# Patient Record
Sex: FEMALE | Race: WHITE | NOT HISPANIC OR LATINO | Employment: UNEMPLOYED | URBAN - METROPOLITAN AREA
[De-identification: names, ages, dates, MRNs, and addresses within clinical notes are randomized per-mention and may not be internally consistent; named-entity substitution may affect disease eponyms.]

---

## 2017-02-09 ENCOUNTER — OFFICE VISIT (OUTPATIENT)
Dept: OBSTETRICS AND GYNECOLOGY | Age: 32
End: 2017-02-09

## 2017-02-09 ENCOUNTER — TELEPHONE (OUTPATIENT)
Dept: OBSTETRICS AND GYNECOLOGY | Age: 32
End: 2017-02-09

## 2017-02-09 VITALS
HEIGHT: 64 IN | SYSTOLIC BLOOD PRESSURE: 120 MMHG | DIASTOLIC BLOOD PRESSURE: 78 MMHG | WEIGHT: 210 LBS | BODY MASS INDEX: 35.85 KG/M2

## 2017-02-09 DIAGNOSIS — Z91.89 BREASTFEEDING PROBLEM: Primary | ICD-10-CM

## 2017-02-09 PROCEDURE — 99213 OFFICE O/P EST LOW 20 MIN: CPT | Performed by: NURSE PRACTITIONER

## 2017-02-09 NOTE — PROGRESS NOTES
"Mathew Flores is a 31 y.o. female is here today as a self referral.    History of Present Illness   Chief Complaint   Patient presents with   • Breast Pain       Patient's 15 month old abruptly stopped nursing on Saturday.  Previous to that he was nursing twice a day.  Since that time her breast have been tender and full. This morning she noticed a \"lump\" on the right breast.  It was very tender.  She did end up pumping and massaging the area after speaking with Dr Shelton and Maria Dolores and was able to get it soft again and pumped about 5 ounces. She denies any fever or flu like symptoms.  She is not as tender now in that breast as well.  Denies any skin changes on red areas.  The following portions of the patient's history were reviewed and updated as appropriate: allergies, current medications, past family history, past medical history, past social history, past surgical history and problem list.    Review of Systems   Constitutional: Negative.    HENT: Negative.    Eyes: Negative.    Respiratory: Negative.    Cardiovascular: Negative.    Gastrointestinal: Negative.    Endocrine: Negative.    Genitourinary: Negative.    Musculoskeletal: Negative.    Skin: Negative.    Allergic/Immunologic: Negative.    Neurological: Negative.    Hematological: Negative.    Psychiatric/Behavioral: Negative.        Objective   Physical Exam   Constitutional: She is oriented to person, place, and time. She appears well-developed and well-nourished.   Pulmonary/Chest: Effort normal. Right breast exhibits tenderness. Right breast exhibits no inverted nipple, no mass and no skin change. Left breast exhibits tenderness. Left breast exhibits no inverted nipple, no mass and no skin change.   Breast bilaterally firm.  No masses or skin changes.  NO red areas.   Abdominal: Soft. Bowel sounds are normal.   Neurological: She is alert and oriented to person, place, and time.   Skin: Skin is warm.   Psychiatric: She has a normal mood " and affect.         Assessment/Plan   Ynes was seen today for breast pain.    Diagnoses and all orders for this visit:    Breastfeeding problem      Exam normal today and appropriate for someone in the beginning stages of weaning from breastfeeding.  It sounds like she did have some engorgement/clog that cleared with pumping.  Discussed mastitis s/s and weaning strategies.  She is also in conversation with Maria Dolores and plans to follow up with her tomorrow.  She will pump tonight to completely soften both breasts and let us know if she gets any more hard areas/redness or other signs of mastitis.

## 2017-02-09 NOTE — TELEPHONE ENCOUNTER
Pt's baby stopped feeding over weekend. She reports breasts are tender but she does not have a fever. There is no redness and both breast are tender. She notes there is a lump in right breast. She has tried to express the lump but it did not resolve. She will come in today to see NP at 3:00, she could not come in with MD earlier. She will try to pump to see if this area resolves. Suspect clogged duct/engorgement, advised pt to keep f/u appt anyway as she may have early mastitis

## 2018-02-05 ENCOUNTER — OFFICE VISIT (OUTPATIENT)
Dept: OBSTETRICS AND GYNECOLOGY | Age: 33
End: 2018-02-05

## 2018-02-05 ENCOUNTER — PROCEDURE VISIT (OUTPATIENT)
Dept: OBSTETRICS AND GYNECOLOGY | Age: 33
End: 2018-02-05

## 2018-02-05 VITALS
BODY MASS INDEX: 38.07 KG/M2 | SYSTOLIC BLOOD PRESSURE: 120 MMHG | DIASTOLIC BLOOD PRESSURE: 62 MMHG | HEIGHT: 64 IN | WEIGHT: 223 LBS

## 2018-02-05 DIAGNOSIS — O36.80X0 ENCOUNTER TO DETERMINE FETAL VIABILITY OF PREGNANCY, SINGLE OR UNSPECIFIED FETUS: Primary | ICD-10-CM

## 2018-02-05 DIAGNOSIS — O99.891 ASYMPTOMATIC BACTERIURIA DURING PREGNANCY: ICD-10-CM

## 2018-02-05 DIAGNOSIS — Z13.29 SCREENING FOR THYROID DISORDER: ICD-10-CM

## 2018-02-05 DIAGNOSIS — Z3A.08 8 WEEKS GESTATION OF PREGNANCY: ICD-10-CM

## 2018-02-05 DIAGNOSIS — R82.71 ASYMPTOMATIC BACTERIURIA DURING PREGNANCY: ICD-10-CM

## 2018-02-05 PROCEDURE — 76817 TRANSVAGINAL US OBSTETRIC: CPT | Performed by: OBSTETRICS & GYNECOLOGY

## 2018-02-05 PROCEDURE — 99213 OFFICE O/P EST LOW 20 MIN: CPT | Performed by: OBSTETRICS & GYNECOLOGY

## 2018-02-05 RX ORDER — PRENATAL VIT/IRON FUM/FOLIC AC 27MG-0.8MG
TABLET ORAL DAILY
COMMUNITY
End: 2020-05-29

## 2018-02-05 NOTE — PROGRESS NOTES
"Chief complaint:early pregnancy     HPI  Ynes Flores is a 32 y.o. female. Pt here for early pregnany u/s. She denies vaginal bleeding or pelvic pain. Pt has some mild nausea, rare emesis.         The following portions of the patient's history were reviewed and updated as appropriate: allergies, current medications, past family history, past medical history, past social history, past surgical history and problem list.    Review of Systems  Pertinent items are noted in HPI.    /62  Ht 162.6 cm (64\")  Wt 101 kg (223 lb)  LMP 12/01/2017  BMI 38.28 kg/m2    Objective   Physical Exam   Constitutional: She appears well-developed and well-nourished.   Pulmonary/Chest: Effort normal.   Psychiatric: She has a normal mood and affect. Her behavior is normal.     U/s with viable IUP, size less than dates, measures 8 weeks today, normal adnexa    Assessment/Plan   Ynes was seen today for possible pregnancy.    Diagnoses and all orders for this visit:    Encounter to determine fetal viability of pregnancy, single or unspecified fetus    viable IUP, will use u/s edc.   Reviewed flu warnings  Pt taking pnv, reviewed prenatal guidelines, no zika exposures  F/u 3 weeks for ob intake, pt and spouse decline aneuploidy or genetic testing           "

## 2018-02-07 ENCOUNTER — TELEPHONE (OUTPATIENT)
Dept: OBSTETRICS AND GYNECOLOGY | Age: 33
End: 2018-02-07

## 2018-02-07 LAB
ABO GROUP BLD: (no result)
BACTERIA UR CULT: NORMAL
BACTERIA UR CULT: NORMAL
BASOPHILS # BLD AUTO: 0 X10E3/UL (ref 0–0.2)
BASOPHILS NFR BLD AUTO: 0 %
BLD GP AB SCN SERPL QL: NEGATIVE
EOSINOPHIL # BLD AUTO: 0.1 X10E3/UL (ref 0–0.4)
EOSINOPHIL NFR BLD AUTO: 0 %
ERYTHROCYTE [DISTWIDTH] IN BLOOD BY AUTOMATED COUNT: 13.2 % (ref 12.3–15.4)
HBV SURFACE AG SERPL QL IA: NEGATIVE
HCT VFR BLD AUTO: 39.9 % (ref 34–46.6)
HGB BLD-MCNC: 13.4 G/DL (ref 11.1–15.9)
HIV 1+2 AB+HIV1 P24 AG SERPL QL IA: NON REACTIVE
IMM GRANULOCYTES # BLD: 0 X10E3/UL (ref 0–0.1)
IMM GRANULOCYTES NFR BLD: 0 %
LYMPHOCYTES # BLD AUTO: 2.8 X10E3/UL (ref 0.7–3.1)
LYMPHOCYTES NFR BLD AUTO: 23 %
MCH RBC QN AUTO: 29.8 PG (ref 26.6–33)
MCHC RBC AUTO-ENTMCNC: 33.6 G/DL (ref 31.5–35.7)
MCV RBC AUTO: 89 FL (ref 79–97)
MONOCYTES # BLD AUTO: 0.5 X10E3/UL (ref 0.1–0.9)
MONOCYTES NFR BLD AUTO: 4 %
NEUTROPHILS # BLD AUTO: 9.1 X10E3/UL (ref 1.4–7)
NEUTROPHILS NFR BLD AUTO: 73 %
PLATELET # BLD AUTO: 358 X10E3/UL (ref 150–379)
RBC # BLD AUTO: 4.49 X10E6/UL (ref 3.77–5.28)
RH BLD: POSITIVE
RPR SER QL: NON REACTIVE
RUBV IGG SERPL IA-ACNC: 2.99 INDEX
TSH SERPL DL<=0.005 MIU/L-ACNC: 0.47 MIU/ML (ref 0.27–4.2)
WBC # BLD AUTO: 12.5 X10E3/UL (ref 3.4–10.8)

## 2018-02-07 NOTE — TELEPHONE ENCOUNTER
----- Message from Fide Shelton MD sent at 2/7/2018  8:54 AM EST -----  Call Ynes, prenatal labs are normal for pregnancy, thyroid tested and normal

## 2018-02-26 ENCOUNTER — INITIAL PRENATAL (OUTPATIENT)
Dept: OBSTETRICS AND GYNECOLOGY | Age: 33
End: 2018-02-26

## 2018-02-26 VITALS — DIASTOLIC BLOOD PRESSURE: 80 MMHG | BODY MASS INDEX: 37.76 KG/M2 | SYSTOLIC BLOOD PRESSURE: 118 MMHG | WEIGHT: 220 LBS

## 2018-02-26 DIAGNOSIS — Z11.51 SCREENING FOR HPV (HUMAN PAPILLOMAVIRUS): ICD-10-CM

## 2018-02-26 DIAGNOSIS — Z11.3 SCREENING FOR STD (SEXUALLY TRANSMITTED DISEASE): ICD-10-CM

## 2018-02-26 DIAGNOSIS — Z3A.11 11 WEEKS GESTATION OF PREGNANCY: Primary | ICD-10-CM

## 2018-02-26 LAB
EXTERNAL CYSTIC FIBROSIS: NORMAL
EXTERNAL NIPT: NORMAL
VZV IGG SER QL: NORMAL

## 2018-02-26 PROCEDURE — 0501F PRENATAL FLOW SHEET: CPT | Performed by: OBSTETRICS & GYNECOLOGY

## 2018-02-26 NOTE — PROGRESS NOTES
Pt here for ob intake, reviewed prenatal guidelines and zika warnings  Pt declines aneuploidy or cf screening, mild nausea but no other issues  FHT's not heard with doppler but visualized fetal cardiac and movement with bedside u/s  F/u 4 weeks

## 2018-03-01 LAB
C TRACH RRNA CVX QL NAA+PROBE: NEGATIVE
CYTOLOGIST CVX/VAG CYTO: NORMAL
CYTOLOGY CVX/VAG DOC THIN PREP: NORMAL
DX ICD CODE: NORMAL
HIV 1 & 2 AB SER-IMP: NORMAL
HPV I/H RISK 4 DNA CVX QL PROBE+SIG AMP: NEGATIVE
N GONORRHOEA RRNA CVX QL NAA+PROBE: NEGATIVE
OTHER STN SPEC: NORMAL
PATH REPORT.FINAL DX SPEC: NORMAL
STAT OF ADQ CVX/VAG CYTO-IMP: NORMAL

## 2018-03-26 ENCOUNTER — ROUTINE PRENATAL (OUTPATIENT)
Dept: OBSTETRICS AND GYNECOLOGY | Age: 33
End: 2018-03-26

## 2018-03-26 VITALS — SYSTOLIC BLOOD PRESSURE: 126 MMHG | DIASTOLIC BLOOD PRESSURE: 82 MMHG | WEIGHT: 213 LBS | BODY MASS INDEX: 36.56 KG/M2

## 2018-03-26 DIAGNOSIS — Z3A.15 15 WEEKS GESTATION OF PREGNANCY: Primary | ICD-10-CM

## 2018-03-26 LAB — 2ND TRIMESTER 4 SCREEN SERPL-IMP: NORMAL

## 2018-03-26 PROCEDURE — 0502F SUBSEQUENT PRENATAL CARE: CPT | Performed by: OBSTETRICS & GYNECOLOGY

## 2018-03-26 RX ORDER — PSEUDOEPHEDRINE HCL 30 MG
30 TABLET ORAL EVERY 4 HOURS PRN
COMMUNITY
End: 2018-07-03

## 2018-03-26 RX ORDER — GUAIFENESIN AND DEXTROMETHORPHAN HYDROBROMIDE 100; 10 MG/5ML; MG/5ML
5 SOLUTION ORAL EVERY 12 HOURS
COMMUNITY
End: 2018-07-03

## 2018-04-27 ENCOUNTER — PROCEDURE VISIT (OUTPATIENT)
Dept: OBSTETRICS AND GYNECOLOGY | Age: 33
End: 2018-04-27

## 2018-04-27 ENCOUNTER — ROUTINE PRENATAL (OUTPATIENT)
Dept: OBSTETRICS AND GYNECOLOGY | Age: 33
End: 2018-04-27

## 2018-04-27 VITALS — BODY MASS INDEX: 36.05 KG/M2 | SYSTOLIC BLOOD PRESSURE: 122 MMHG | WEIGHT: 210 LBS | DIASTOLIC BLOOD PRESSURE: 72 MMHG

## 2018-04-27 DIAGNOSIS — Z3A.19 19 WEEKS GESTATION OF PREGNANCY: ICD-10-CM

## 2018-04-27 DIAGNOSIS — Z36.89 ENCOUNTER FOR FETAL ANATOMIC SURVEY: Primary | ICD-10-CM

## 2018-04-27 DIAGNOSIS — Z13.1 SCREENING FOR DIABETES MELLITUS: Primary | ICD-10-CM

## 2018-04-27 PROCEDURE — 76805 OB US >/= 14 WKS SNGL FETUS: CPT | Performed by: OBSTETRICS & GYNECOLOGY

## 2018-04-27 PROCEDURE — 0502F SUBSEQUENT PRENATAL CARE: CPT | Performed by: OBSTETRICS & GYNECOLOGY

## 2018-04-27 NOTE — PROGRESS NOTES
Pt is here for f/u and u/s  U/s reviewed: normal anatomic survey, normal cervical length with abdominal imaging, male anatomy noted  Plan f/u 4 weeks  Will check hgba1c and random glucose today, if normal, plan one hour in 7 weeks

## 2018-04-28 ENCOUNTER — TELEPHONE (OUTPATIENT)
Dept: OBSTETRICS AND GYNECOLOGY | Age: 33
End: 2018-04-28

## 2018-04-28 DIAGNOSIS — Z13.1 SCREENING FOR DIABETES MELLITUS: ICD-10-CM

## 2018-04-28 DIAGNOSIS — R73.09 ELEVATED GLUCOSE: Primary | ICD-10-CM

## 2018-04-28 LAB
ALBUMIN SERPL-MCNC: 3.8 G/DL (ref 3.5–5.2)
ALBUMIN/GLOB SERPL: 1.6 G/DL
ALP SERPL-CCNC: 62 U/L (ref 39–117)
ALT SERPL-CCNC: 14 U/L (ref 1–33)
AST SERPL-CCNC: 26 U/L (ref 1–32)
BILIRUB SERPL-MCNC: <0.2 MG/DL (ref 0.1–1.2)
BUN SERPL-MCNC: 8 MG/DL (ref 6–20)
BUN/CREAT SERPL: 19 (ref 7–25)
CALCIUM SERPL-MCNC: 9.5 MG/DL (ref 8.6–10.5)
CHLORIDE SERPL-SCNC: 101 MMOL/L (ref 98–107)
CO2 SERPL-SCNC: 24 MMOL/L (ref 22–29)
CREAT SERPL-MCNC: 0.42 MG/DL (ref 0.57–1)
GFR SERPLBLD CREATININE-BSD FMLA CKD-EPI: >150 ML/MIN/1.73
GFR SERPLBLD CREATININE-BSD FMLA CKD-EPI: >150 ML/MIN/1.73
GLOBULIN SER CALC-MCNC: 2.4 GM/DL
GLUCOSE SERPL-MCNC: 105 MG/DL (ref 65–99)
HBA1C MFR BLD: 5.6 % (ref 4.8–5.6)
POTASSIUM SERPL-SCNC: 4.1 MMOL/L (ref 3.5–5.2)
PROT SERPL-MCNC: 6.2 G/DL (ref 6–8.5)
SODIUM SERPL-SCNC: 141 MMOL/L (ref 136–145)

## 2018-04-28 NOTE — TELEPHONE ENCOUNTER
Called pt and reviewed elevated random glucose and upper normal result on hgbA1c. Recommend she proceed with early one hour and order placed. Pt understands and will come in this week.

## 2018-05-02 ENCOUNTER — TELEPHONE (OUTPATIENT)
Dept: OBSTETRICS AND GYNECOLOGY | Age: 33
End: 2018-05-02

## 2018-05-02 DIAGNOSIS — R73.09 ELEVATED GLUCOSE TOLERANCE TEST: Primary | ICD-10-CM

## 2018-05-02 LAB — GLUCOSE 1H P 50 G GLC PO SERPL-MCNC: 138 MG/DL (ref 65–139)

## 2018-05-02 NOTE — TELEPHONE ENCOUNTER
Called pt and reviewed glucose results. Recommend 3 hour since elevated early screen and pt understands. Order placed and she will do prior to next visit

## 2018-05-22 ENCOUNTER — ROUTINE PRENATAL (OUTPATIENT)
Dept: OBSTETRICS AND GYNECOLOGY | Age: 33
End: 2018-05-22

## 2018-05-22 VITALS — BODY MASS INDEX: 35.7 KG/M2 | DIASTOLIC BLOOD PRESSURE: 70 MMHG | SYSTOLIC BLOOD PRESSURE: 110 MMHG | WEIGHT: 208 LBS

## 2018-05-22 DIAGNOSIS — Z3A.23 23 WEEKS GESTATION OF PREGNANCY: Primary | ICD-10-CM

## 2018-05-22 PROCEDURE — 0502F SUBSEQUENT PRENATAL CARE: CPT | Performed by: OBSTETRICS & GYNECOLOGY

## 2018-05-22 NOTE — PROGRESS NOTES
Pt denies any issues today  Passed early 3 hour, will repeat at f/u with cbc  Recommend Hep A vaccine since recommended for community  Recommend tdap at 28 + weeks

## 2018-06-19 ENCOUNTER — ROUTINE PRENATAL (OUTPATIENT)
Dept: OBSTETRICS AND GYNECOLOGY | Age: 33
End: 2018-06-19

## 2018-06-19 ENCOUNTER — PROCEDURE VISIT (OUTPATIENT)
Dept: OBSTETRICS AND GYNECOLOGY | Age: 33
End: 2018-06-19

## 2018-06-19 VITALS — SYSTOLIC BLOOD PRESSURE: 114 MMHG | DIASTOLIC BLOOD PRESSURE: 64 MMHG | BODY MASS INDEX: 35.02 KG/M2 | WEIGHT: 204 LBS

## 2018-06-19 DIAGNOSIS — O36.5990 POOR FETAL GROWTH AFFECTING MANAGEMENT OF MOTHER, ANTEPARTUM, SINGLE OR UNSPECIFIED FETUS: ICD-10-CM

## 2018-06-19 DIAGNOSIS — R11.0 NAUSEA: ICD-10-CM

## 2018-06-19 DIAGNOSIS — Z13.0 SCREENING FOR DEFICIENCY ANEMIA: ICD-10-CM

## 2018-06-19 DIAGNOSIS — Z3A.27 27 WEEKS GESTATION OF PREGNANCY: ICD-10-CM

## 2018-06-19 DIAGNOSIS — Z13.1 SCREENING FOR DIABETES MELLITUS: Primary | ICD-10-CM

## 2018-06-19 PROCEDURE — 76819 FETAL BIOPHYS PROFIL W/O NST: CPT | Performed by: OBSTETRICS & GYNECOLOGY

## 2018-06-19 PROCEDURE — 76816 OB US FOLLOW-UP PER FETUS: CPT | Performed by: OBSTETRICS & GYNECOLOGY

## 2018-06-19 PROCEDURE — 0502F SUBSEQUENT PRENATAL CARE: CPT | Performed by: OBSTETRICS & GYNECOLOGY

## 2018-06-19 NOTE — PROGRESS NOTES
No complaints except recent bronchitis, better after zpack  She still has occ n/v but not severe; it was worse during her URI, she denies any pain  Notes active fetal movement, reviewed daily fmc's  Repeat 3 hour today with CBC  Wt loss noted-pt notes n/v worsened during URI and feeling better now-plan check growth u/s, cmp and amylase/lipase  rtc 2 weeks

## 2018-06-19 NOTE — PROGRESS NOTES
Addendum: u/s with decreased growth with overall efw at 11 %, and ac at 13%, requested MFM consult/u/s, pt counseled

## 2018-06-20 LAB
ALBUMIN SERPL-MCNC: 3.6 G/DL (ref 3.5–5.2)
ALBUMIN/GLOB SERPL: 1.3 G/DL
ALP SERPL-CCNC: 84 U/L (ref 39–117)
ALT SERPL-CCNC: 29 U/L (ref 1–33)
AMYLASE SERPL-CCNC: 71 U/L (ref 28–100)
AST SERPL-CCNC: 34 U/L (ref 1–32)
BASOPHILS # BLD AUTO: 0.01 10*3/MM3 (ref 0–0.2)
BASOPHILS NFR BLD AUTO: 0.1 % (ref 0–1.5)
BILIRUB SERPL-MCNC: 0.2 MG/DL (ref 0.1–1.2)
BUN SERPL-MCNC: 6 MG/DL (ref 6–20)
BUN/CREAT SERPL: 14 (ref 7–25)
CALCIUM SERPL-MCNC: 9.1 MG/DL (ref 8.6–10.5)
CHLORIDE SERPL-SCNC: 102 MMOL/L (ref 98–107)
CO2 SERPL-SCNC: 22.9 MMOL/L (ref 22–29)
CREAT SERPL-MCNC: 0.43 MG/DL (ref 0.57–1)
EOSINOPHIL # BLD AUTO: 0.04 10*3/MM3 (ref 0–0.7)
EOSINOPHIL NFR BLD AUTO: 0.4 % (ref 0.3–6.2)
ERYTHROCYTE [DISTWIDTH] IN BLOOD BY AUTOMATED COUNT: 14.1 % (ref 11.7–13)
GFR SERPLBLD CREATININE-BSD FMLA CKD-EPI: >150 ML/MIN/1.73
GFR SERPLBLD CREATININE-BSD FMLA CKD-EPI: >150 ML/MIN/1.73
GLOBULIN SER CALC-MCNC: 2.7 GM/DL
GLUCOSE 1H P 100 G GLC PO SERPL-MCNC: 156 MG/DL (ref 40–300)
GLUCOSE 2H P 100 G GLC PO SERPL-MCNC: 141 MG/DL (ref 40–300)
GLUCOSE 3H P 100 G GLC PO SERPL-MCNC: 107 MG/DL (ref 40–300)
GLUCOSE P FAST SERPL-MCNC: 85 MG/DL
GLUCOSE SERPL-MCNC: 111 MG/DL (ref 65–99)
HCT VFR BLD AUTO: 38.7 % (ref 35.6–45.5)
HGB BLD-MCNC: 12.3 G/DL (ref 11.9–15.5)
IMM GRANULOCYTES # BLD: 0.07 10*3/MM3 (ref 0–0.03)
IMM GRANULOCYTES NFR BLD: 0.6 % (ref 0–0.5)
LIPASE SERPL-CCNC: 55 U/L (ref 13–60)
LYMPHOCYTES # BLD AUTO: 2.26 10*3/MM3 (ref 0.9–4.8)
LYMPHOCYTES NFR BLD AUTO: 20.5 % (ref 19.6–45.3)
MCH RBC QN AUTO: 29.6 PG (ref 26.9–32)
MCHC RBC AUTO-ENTMCNC: 31.8 G/DL (ref 32.4–36.3)
MCV RBC AUTO: 93.3 FL (ref 80.5–98.2)
MONOCYTES # BLD AUTO: 0.49 10*3/MM3 (ref 0.2–1.2)
MONOCYTES NFR BLD AUTO: 4.4 % (ref 5–12)
NEUTROPHILS # BLD AUTO: 8.22 10*3/MM3 (ref 1.9–8.1)
NEUTROPHILS NFR BLD AUTO: 74.6 % (ref 42.7–76)
PLATELET # BLD AUTO: 299 10*3/MM3 (ref 140–500)
POTASSIUM SERPL-SCNC: 4.1 MMOL/L (ref 3.5–5.2)
PROT SERPL-MCNC: 6.3 G/DL (ref 6–8.5)
RBC # BLD AUTO: 4.15 10*6/MM3 (ref 3.9–5.2)
SODIUM SERPL-SCNC: 141 MMOL/L (ref 136–145)
WBC # BLD AUTO: 11.02 10*3/MM3 (ref 4.5–10.7)

## 2018-06-27 ENCOUNTER — HOSPITAL ENCOUNTER (OUTPATIENT)
Dept: ULTRASOUND IMAGING | Facility: HOSPITAL | Age: 33
Discharge: HOME | End: 2018-06-27
Attending: OBSTETRICS & GYNECOLOGY | Admitting: OBSTETRICS & GYNECOLOGY

## 2018-06-27 ENCOUNTER — TRANSCRIBE ORDERS (OUTPATIENT)
Dept: ADMINISTRATIVE | Facility: HOSPITAL | Age: 33
End: 2018-06-27

## 2018-06-27 DIAGNOSIS — O36.5990 POOR FETAL GROWTH AFFECTING MANAGEMENT OF MOTHER, ANTEPARTUM, SINGLE OR UNSPECIFIED FETUS: Primary | ICD-10-CM

## 2018-06-27 PROCEDURE — 76805 OB US >/= 14 WKS SNGL FETUS: CPT

## 2018-06-27 NOTE — CONSULTS
Ms. Flores is referred by Dr. Shelton for MFM consultation on indication of small for gestational age  She is accompanied by her  who was present throughout the ultrasound exam and consultation.    32  at 28 2/7 per ALVARO 18    Prenatal course is significant for  220# on 18  204# on     Pt and her  state she is eating more healthily now, during pregnancy.  They state she also lost weight during her previous pregnancy, with  weight 8#7oz.    PMH  Obstetric History       T1      L1     SAB0   TAB0   Ectopic0   Molar0   Multiple0   Live Births1       # Outcome Date GA Lbr Regulo/2nd Weight Sex Delivery Anes PTL Lv   2 Current            1 Term 10/26/15 39w0d  3827 g (8 lb 7 oz) M Vag-Spont EPI N JONA          Past Medical History:   Diagnosis Date   • Vaginal delivery    Bronchitis about 1 month ago, took Zpac    Allergies   Allergen Reactions   • Amoxicillin Hives   • Cefdinir Hives   • Penicillins Hives       No past surgical history on file.   Cyst removed from back of left ear when she was in [employer].      Current Outpatient Prescriptions:   •  dextromethorphan-guaifenesin (ROBITUSSIN-DM)  MG/5ML syrup, Take 5 mL by mouth Every 12 (Twelve) Hours., Disp: , Rfl:   •  Prenatal Vit-Fe Fumarate-FA (PRENATAL VITAMIN 27-0.8) 27-0.8 MG tablet tablet, Take  by mouth Daily., Disp: , Rfl:   •  pseudoephedrine (SUDAFED) 30 MG tablet, Take 30 mg by mouth Every 4 (Four) Hours As Needed for Congestion., Disp: , Rfl:   Not currently taking mucinex.  Took a Zpac ealier    Family history is negative for mental retardation, trisomy 21, congenital heart disease, spina bifida, cystic fibrosis, Zoroastrianism ancestry.   The patient has a family history of    Social history  No tobacco, alcohol, street drug use  Employment:  Stay at home      Labs  Declined:  -Cystic fibrosis carrier screen  -cfDNA screen     See US report        Impression:  28 27 per ALVARO 18.  Normal fetal  "growth per EFW at 83rd centile.   BMI = 35.        Recommendations:       Evaluate interval growth in 3 weeks and subsequently every 4 weeks as clinically indicated, per concern regarding maternal weight loss.        Initiate weekly BPP by 36 weeks per BMI > 35 association with increased frequency of late third trimester stillbirth.           Suggest [facility] [place] nutrition (\"Dietician\") consult re: dietary intake.      Suggest dietary journal, eg [employer] Neil.     PHYSICIAN RECOMMENDATIONS:     Cystic Fibrosis and SMA carrier screening was not documented / declined but I encourage testing through your office.     DVT Prophylaxis: SCDs during any hospital care, particularly peripartum.  If C/S  delivery is required, recommend SCDs and antibiotic prophylaxis plus early ambulation, and postpartum Lovenox 60 mg daily beginning 12-24 hrs post-op and continued to hospital discharge.     ACOG along with the CDC recommend Tdap vaccine after 20 weeks gestation during each pregnancy and a flu vaccine during the flu season to provide passive immunity to the .    For billing purposes, duration of face to face consultation was approximately 30 minutes of which > 50% was devoted to patient counseling and coordination of care, exclusive of US exam.      "

## 2018-07-03 ENCOUNTER — ROUTINE PRENATAL (OUTPATIENT)
Dept: OBSTETRICS AND GYNECOLOGY | Age: 33
End: 2018-07-03

## 2018-07-03 VITALS — DIASTOLIC BLOOD PRESSURE: 74 MMHG | BODY MASS INDEX: 35.36 KG/M2 | SYSTOLIC BLOOD PRESSURE: 118 MMHG | WEIGHT: 206 LBS

## 2018-07-03 DIAGNOSIS — Z3A.29 29 WEEKS GESTATION OF PREGNANCY: Primary | ICD-10-CM

## 2018-07-03 PROCEDURE — 0502F SUBSEQUENT PRENATAL CARE: CPT | Performed by: OBSTETRICS & GYNECOLOGY

## 2018-07-03 PROCEDURE — 90471 IMMUNIZATION ADMIN: CPT | Performed by: OBSTETRICS & GYNECOLOGY

## 2018-07-03 PROCEDURE — 90715 TDAP VACCINE 7 YRS/> IM: CPT | Performed by: OBSTETRICS & GYNECOLOGY

## 2018-07-03 NOTE — PROGRESS NOTES
Here for f/u and no issues, she reports good fetal movement and denies ctx/bleeding/leaking fluid  She saw MFM and u/s there was normal at (89 % ); she will f/u there in 2 weeks to re-assess  Tdap needed and given today, pt counseled, rtc here 2 weeks, continue daily fmc's  Wt gain noted from prior visit, pt reports she is eating well

## 2018-07-16 ENCOUNTER — HOSPITAL ENCOUNTER (OUTPATIENT)
Dept: ULTRASOUND IMAGING | Facility: HOSPITAL | Age: 33
Discharge: HOME | End: 2018-07-16
Attending: OBSTETRICS & GYNECOLOGY | Admitting: OBSTETRICS & GYNECOLOGY

## 2018-07-16 ENCOUNTER — TRANSCRIBE ORDERS (OUTPATIENT)
Dept: ADMINISTRATIVE | Facility: HOSPITAL | Age: 33
End: 2018-07-16

## 2018-07-16 DIAGNOSIS — O36.5990 POOR FETAL GROWTH AFFECTING MANAGEMENT OF MOTHER, ANTEPARTUM, SINGLE OR UNSPECIFIED FETUS: ICD-10-CM

## 2018-07-16 PROCEDURE — 76816 OB US FOLLOW-UP PER FETUS: CPT

## 2018-07-20 ENCOUNTER — ROUTINE PRENATAL (OUTPATIENT)
Dept: OBSTETRICS AND GYNECOLOGY | Age: 33
End: 2018-07-20

## 2018-07-20 VITALS — WEIGHT: 207.6 LBS | SYSTOLIC BLOOD PRESSURE: 110 MMHG | DIASTOLIC BLOOD PRESSURE: 68 MMHG | BODY MASS INDEX: 35.63 KG/M2

## 2018-07-20 DIAGNOSIS — Z3A.31 31 WEEKS GESTATION OF PREGNANCY: Primary | ICD-10-CM

## 2018-07-20 PROBLEM — R63.8 INCREASED BMI: Status: ACTIVE | Noted: 2018-07-20

## 2018-07-20 PROCEDURE — 0502F SUBSEQUENT PRENATAL CARE: CPT | Performed by: OBSTETRICS & GYNECOLOGY

## 2018-07-20 RX ORDER — DIPHENHYDRAMINE HCL 25 MG
25 CAPSULE ORAL EVERY 6 HOURS PRN
COMMUNITY
End: 2018-08-10

## 2018-07-20 NOTE — PROGRESS NOTES
No issues today, had u/s with MFM this week, growth was normal at 46 %, 3 lb 12 oz, AC normal as well, MFM advised repeat growth 4 weeks and BPP 36+ weeks; pt desires to do these here at office  Advised daily fmc's, f/u 2 weeks

## 2018-08-03 ENCOUNTER — ROUTINE PRENATAL (OUTPATIENT)
Dept: OBSTETRICS AND GYNECOLOGY | Age: 33
End: 2018-08-03

## 2018-08-03 VITALS — DIASTOLIC BLOOD PRESSURE: 80 MMHG | WEIGHT: 208 LBS | SYSTOLIC BLOOD PRESSURE: 118 MMHG | BODY MASS INDEX: 35.7 KG/M2

## 2018-08-03 DIAGNOSIS — Z3A.33 33 WEEKS GESTATION OF PREGNANCY: Primary | ICD-10-CM

## 2018-08-03 PROCEDURE — 0502F SUBSEQUENT PRENATAL CARE: CPT | Performed by: OBSTETRICS & GYNECOLOGY

## 2018-08-03 NOTE — PROGRESS NOTES
Here for f/u OB appt  She was treated for URI by primary MD, she just finished a zpack; she still has cough but reports mucous was purulent but now it has cleared. She denies soa or fevers.   Pt denies any OB issues, reports active fetal movement, no ctx/bleeding/leaking fluid  O: pt in no distress  Lungs: clear bilaterally, normal respiratory effort  Heart: regular rate and rhythm  A/p: URI: sputum cleared with antibiotic therapy, reviewed supportive measures, advised to ER with soa / fevers /chest pain  F/u 1 week for growth u/s/ BPP, continue daily fmc's and labor warnings

## 2018-08-10 ENCOUNTER — ROUTINE PRENATAL (OUTPATIENT)
Dept: OBSTETRICS AND GYNECOLOGY | Age: 33
End: 2018-08-10

## 2018-08-10 ENCOUNTER — PROCEDURE VISIT (OUTPATIENT)
Dept: OBSTETRICS AND GYNECOLOGY | Age: 33
End: 2018-08-10

## 2018-08-10 VITALS — BODY MASS INDEX: 35.87 KG/M2 | WEIGHT: 209 LBS | DIASTOLIC BLOOD PRESSURE: 70 MMHG | SYSTOLIC BLOOD PRESSURE: 110 MMHG

## 2018-08-10 DIAGNOSIS — Z3A.34 34 WEEKS GESTATION OF PREGNANCY: Primary | ICD-10-CM

## 2018-08-10 DIAGNOSIS — Z36.89 ULTRASOUND SCAN TO CHECK INTERVAL GROWTH OF FETUS: ICD-10-CM

## 2018-08-10 DIAGNOSIS — R63.8 INCREASED BMI: ICD-10-CM

## 2018-08-10 PROCEDURE — 0502F SUBSEQUENT PRENATAL CARE: CPT | Performed by: OBSTETRICS & GYNECOLOGY

## 2018-08-10 PROCEDURE — 76816 OB US FOLLOW-UP PER FETUS: CPT | Performed by: OBSTETRICS & GYNECOLOGY

## 2018-08-10 PROCEDURE — 76819 FETAL BIOPHYS PROFIL W/O NST: CPT | Performed by: OBSTETRICS & GYNECOLOGY

## 2018-08-10 RX ORDER — ASCORBIC ACID 250 MG
TABLET,CHEWABLE ORAL
COMMUNITY
End: 2020-11-04

## 2018-08-20 ENCOUNTER — ROUTINE PRENATAL (OUTPATIENT)
Dept: OBSTETRICS AND GYNECOLOGY | Age: 33
End: 2018-08-20

## 2018-08-20 ENCOUNTER — PROCEDURE VISIT (OUTPATIENT)
Dept: OBSTETRICS AND GYNECOLOGY | Age: 33
End: 2018-08-20

## 2018-08-20 VITALS — BODY MASS INDEX: 36.05 KG/M2 | SYSTOLIC BLOOD PRESSURE: 110 MMHG | DIASTOLIC BLOOD PRESSURE: 70 MMHG | WEIGHT: 210 LBS

## 2018-08-20 DIAGNOSIS — Z3A.36 36 WEEKS GESTATION OF PREGNANCY: Primary | ICD-10-CM

## 2018-08-20 DIAGNOSIS — R63.8 INCREASED BMI: ICD-10-CM

## 2018-08-20 DIAGNOSIS — O09.523 ELDERLY MULTIGRAVIDA IN THIRD TRIMESTER: Primary | ICD-10-CM

## 2018-08-20 DIAGNOSIS — Z36.85 ANTENATAL SCREENING FOR STREPTOCOCCUS B: ICD-10-CM

## 2018-08-20 PROCEDURE — 0502F SUBSEQUENT PRENATAL CARE: CPT | Performed by: OBSTETRICS & GYNECOLOGY

## 2018-08-20 PROCEDURE — 76819 FETAL BIOPHYS PROFIL W/O NST: CPT | Performed by: OBSTETRICS & GYNECOLOGY

## 2018-08-20 RX ORDER — GUAIFENESIN 600 MG/1
1200 TABLET, EXTENDED RELEASE ORAL 2 TIMES DAILY
COMMUNITY
End: 2020-11-04

## 2018-08-20 NOTE — PROGRESS NOTES
Here for f/u visit and BPP, no issues; notes active fetal movement, denies reg ctx/bleeding/leaking fluid  Her URI symptoms are resolving now  O: BPP 8/8, larisa normal at 9.5    A/P: GBS done today with sensitivity    Advised daily fmc's and reviewed labor warnings    Continue weekly BPP for increased BMI

## 2018-08-22 LAB — GP B STREP DNA SPEC QL NAA+PROBE: NEGATIVE

## 2018-08-23 ENCOUNTER — TELEPHONE (OUTPATIENT)
Dept: OBSTETRICS AND GYNECOLOGY | Age: 33
End: 2018-08-23

## 2018-08-23 NOTE — TELEPHONE ENCOUNTER
----- Message from Fide Shelton MD sent at 8/22/2018  5:03 PM EDT -----  Please call Ynes, her GBS is negative

## 2018-08-27 ENCOUNTER — PROCEDURE VISIT (OUTPATIENT)
Dept: OBSTETRICS AND GYNECOLOGY | Age: 33
End: 2018-08-27

## 2018-08-27 ENCOUNTER — ROUTINE PRENATAL (OUTPATIENT)
Dept: OBSTETRICS AND GYNECOLOGY | Age: 33
End: 2018-08-27

## 2018-08-27 VITALS — WEIGHT: 212.8 LBS | SYSTOLIC BLOOD PRESSURE: 118 MMHG | DIASTOLIC BLOOD PRESSURE: 74 MMHG | BODY MASS INDEX: 36.53 KG/M2

## 2018-08-27 DIAGNOSIS — Z3A.37 37 WEEKS GESTATION OF PREGNANCY: Primary | ICD-10-CM

## 2018-08-27 PROCEDURE — 76819 FETAL BIOPHYS PROFIL W/O NST: CPT | Performed by: OBSTETRICS & GYNECOLOGY

## 2018-08-27 PROCEDURE — 0502F SUBSEQUENT PRENATAL CARE: CPT | Performed by: OBSTETRICS & GYNECOLOGY

## 2018-08-27 NOTE — PROGRESS NOTES
Pt presents for f/u and BPP; she denies issues and notes active fetal movement  O: u/s: BPP 8/8, larisa normal at 10  A/p: 37 weeks, continue weekly BPP and daily fmc's, reviewed labor warnings  Pt requested order for breast pump and given

## 2018-09-04 ENCOUNTER — ROUTINE PRENATAL (OUTPATIENT)
Dept: OBSTETRICS AND GYNECOLOGY | Age: 33
End: 2018-09-04

## 2018-09-04 ENCOUNTER — PROCEDURE VISIT (OUTPATIENT)
Dept: OBSTETRICS AND GYNECOLOGY | Age: 33
End: 2018-09-04

## 2018-09-04 VITALS — BODY MASS INDEX: 36.32 KG/M2 | WEIGHT: 211.6 LBS | DIASTOLIC BLOOD PRESSURE: 72 MMHG | SYSTOLIC BLOOD PRESSURE: 110 MMHG

## 2018-09-04 DIAGNOSIS — Z3A.38 38 WEEKS GESTATION OF PREGNANCY: Primary | ICD-10-CM

## 2018-09-04 DIAGNOSIS — R63.8 INCREASED BMI: ICD-10-CM

## 2018-09-04 PROCEDURE — 0502F SUBSEQUENT PRENATAL CARE: CPT | Performed by: OBSTETRICS & GYNECOLOGY

## 2018-09-04 PROCEDURE — 76819 FETAL BIOPHYS PROFIL W/O NST: CPT | Performed by: OBSTETRICS & GYNECOLOGY

## 2018-09-04 NOTE — PROGRESS NOTES
Here for visit and BPP, pt notes normal fetal movement, no regular ctx  BPP : 8/8, larisa 12  A/p: 38 weeks, recommend IOL next week at 39 weeks, pt wants to consider, will repeat BPP and growth next week; continue daily fmc's and labor warnings

## 2018-09-11 ENCOUNTER — ROUTINE PRENATAL (OUTPATIENT)
Dept: OBSTETRICS AND GYNECOLOGY | Age: 33
End: 2018-09-11

## 2018-09-11 ENCOUNTER — PROCEDURE VISIT (OUTPATIENT)
Dept: OBSTETRICS AND GYNECOLOGY | Age: 33
End: 2018-09-11

## 2018-09-11 ENCOUNTER — ANESTHESIA (OUTPATIENT)
Dept: LABOR AND DELIVERY | Facility: HOSPITAL | Age: 33
End: 2018-09-11

## 2018-09-11 ENCOUNTER — HOSPITAL ENCOUNTER (INPATIENT)
Facility: HOSPITAL | Age: 33
LOS: 3 days | Discharge: HOME | End: 2018-09-14
Attending: OBSTETRICS & GYNECOLOGY | Admitting: OBSTETRICS & GYNECOLOGY

## 2018-09-11 ENCOUNTER — PREP FOR SURGERY (OUTPATIENT)
Dept: OBSTETRICS AND GYNECOLOGY | Age: 33
End: 2018-09-11

## 2018-09-11 ENCOUNTER — ANESTHESIA EVENT (OUTPATIENT)
Dept: LABOR AND DELIVERY | Facility: HOSPITAL | Age: 33
End: 2018-09-11

## 2018-09-11 VITALS — BODY MASS INDEX: 36.73 KG/M2 | WEIGHT: 214 LBS | DIASTOLIC BLOOD PRESSURE: 78 MMHG | SYSTOLIC BLOOD PRESSURE: 120 MMHG

## 2018-09-11 DIAGNOSIS — Z3A.39 39 WEEKS GESTATION OF PREGNANCY: Primary | ICD-10-CM

## 2018-09-11 DIAGNOSIS — O36.5990 POOR FETAL GROWTH AFFECTING MANAGEMENT OF MOTHER, ANTEPARTUM, SINGLE OR UNSPECIFIED FETUS: ICD-10-CM

## 2018-09-11 DIAGNOSIS — O36.5990 POOR FETAL GROWTH AFFECTING MANAGEMENT OF MOTHER, ANTEPARTUM, SINGLE OR UNSPECIFIED FETUS: Primary | ICD-10-CM

## 2018-09-11 LAB
DEPRECATED RDW RBC AUTO: 44.8 FL (ref 37–54)
ERYTHROCYTE [DISTWIDTH] IN BLOOD BY AUTOMATED COUNT: 13.8 % (ref 11.7–13)
HCT VFR BLD AUTO: 37.6 % (ref 35.6–45.5)
HGB BLD-MCNC: 11.9 G/DL (ref 11.9–15.5)
MCH RBC QN AUTO: 28.5 PG (ref 26.9–32)
MCHC RBC AUTO-ENTMCNC: 31.6 G/DL (ref 32.4–36.3)
MCV RBC AUTO: 90.2 FL (ref 80.5–98.2)
PLATELET # BLD AUTO: 273 10*3/MM3 (ref 140–500)
PMV BLD AUTO: 11.1 FL (ref 6–12)
RBC # BLD AUTO: 4.17 10*6/MM3 (ref 3.9–5.2)
WBC NRBC COR # BLD: 12.07 10*3/MM3 (ref 4.5–10.7)

## 2018-09-11 PROCEDURE — 76819 FETAL BIOPHYS PROFIL W/O NST: CPT | Performed by: OBSTETRICS & GYNECOLOGY

## 2018-09-11 PROCEDURE — 0502F SUBSEQUENT PRENATAL CARE: CPT | Performed by: OBSTETRICS & GYNECOLOGY

## 2018-09-11 PROCEDURE — 86900 BLOOD TYPING SEROLOGIC ABO: CPT | Performed by: OBSTETRICS & GYNECOLOGY

## 2018-09-11 PROCEDURE — 85027 COMPLETE CBC AUTOMATED: CPT | Performed by: OBSTETRICS & GYNECOLOGY

## 2018-09-11 PROCEDURE — 86901 BLOOD TYPING SEROLOGIC RH(D): CPT | Performed by: OBSTETRICS & GYNECOLOGY

## 2018-09-11 PROCEDURE — 86850 RBC ANTIBODY SCREEN: CPT | Performed by: OBSTETRICS & GYNECOLOGY

## 2018-09-11 RX ORDER — FAMOTIDINE 10 MG/ML
20 INJECTION, SOLUTION INTRAVENOUS ONCE AS NEEDED
Status: DISCONTINUED | OUTPATIENT
Start: 2018-09-11 | End: 2018-09-12 | Stop reason: HOSPADM

## 2018-09-11 RX ORDER — SODIUM CHLORIDE 0.9 % (FLUSH) 0.9 %
1-10 SYRINGE (ML) INJECTION AS NEEDED
Status: CANCELLED | OUTPATIENT
Start: 2018-09-11

## 2018-09-11 RX ORDER — EPHEDRINE SULFATE 50 MG/ML
5 INJECTION, SOLUTION INTRAVENOUS AS NEEDED
Status: DISCONTINUED | OUTPATIENT
Start: 2018-09-11 | End: 2018-09-12 | Stop reason: HOSPADM

## 2018-09-11 RX ORDER — DIPHENHYDRAMINE HYDROCHLORIDE 50 MG/ML
12.5 INJECTION INTRAMUSCULAR; INTRAVENOUS EVERY 8 HOURS PRN
Status: DISCONTINUED | OUTPATIENT
Start: 2018-09-11 | End: 2018-09-12 | Stop reason: HOSPADM

## 2018-09-11 RX ORDER — ONDANSETRON 2 MG/ML
4 INJECTION INTRAMUSCULAR; INTRAVENOUS ONCE AS NEEDED
Status: DISCONTINUED | OUTPATIENT
Start: 2018-09-11 | End: 2018-09-12 | Stop reason: HOSPADM

## 2018-09-11 RX ADMIN — DINOPROSTONE 10 MG: 10 INSERT VAGINAL at 23:20

## 2018-09-11 NOTE — PROGRESS NOTES
Here for f/u, no issues today  U/s: EFW 7 lb 1 oz, AC at 5 % larisa 7 to 10, BPP 8/8  A/p: 39 weeks with decreased growth and increased BMI  Recommend IOL given diminished growth noted with trending and AC now under 10 %. In addition, reviewed MFM consult regarding increased BMI and increased risk of stillbirth. Reviewed risks of IOL to include hemorrhage, infection, possibly increased c/s  however, given her risk factors, I would recommend induction. Pt and spouse considered options and desire to proceed with IOL. Plan this pm with cervidil. Reviewed instructions.

## 2018-09-12 LAB
ABO GROUP BLD: NORMAL
ATMOSPHERIC PRESS: 755.6 MMHG
BASE EXCESS BLDCOV CALC-SCNC: -3.7 MMOL/L (ref -30–30)
BDY SITE: ABNORMAL
BLD GP AB SCN SERPL QL: NEGATIVE
GAS FLOW AIRWAY: 10 LPM
HCO3 BLDCOV-SCNC: 22.5 MMOL/L
MODALITY: ABNORMAL
PCO2 BLDCOV: 44 MM HG (ref 35–51.3)
PH BLDCOV: 7.32 PH UNITS (ref 7.26–7.4)
PO2 BLDCOV: <25.2 MM HG (ref 19–39)
RH BLD: POSITIVE
SAO2 % BLDCOA: 36.1 % (ref 92–99)
SAO2 % BLDCOV: ABNORMAL %
T&S EXPIRATION DATE: NORMAL

## 2018-09-12 PROCEDURE — 25010000002 BUTORPHANOL PER 1 MG: Performed by: OBSTETRICS & GYNECOLOGY

## 2018-09-12 PROCEDURE — 59400 OBSTETRICAL CARE: CPT | Performed by: OBSTETRICS & GYNECOLOGY

## 2018-09-12 PROCEDURE — 3E0P7VZ INTRODUCTION OF HORMONE INTO FEMALE REPRODUCTIVE, VIA NATURAL OR ARTIFICIAL OPENING: ICD-10-PCS | Performed by: OBSTETRICS & GYNECOLOGY

## 2018-09-12 PROCEDURE — 88307 TISSUE EXAM BY PATHOLOGIST: CPT

## 2018-09-12 PROCEDURE — 82803 BLOOD GASES ANY COMBINATION: CPT

## 2018-09-12 PROCEDURE — C1755 CATHETER, INTRASPINAL: HCPCS | Performed by: ANESTHESIOLOGY

## 2018-09-12 PROCEDURE — 0HQ9XZZ REPAIR PERINEUM SKIN, EXTERNAL APPROACH: ICD-10-PCS | Performed by: OBSTETRICS & GYNECOLOGY

## 2018-09-12 RX ORDER — METHYLERGONOVINE MALEATE 0.2 MG/ML
200 INJECTION INTRAVENOUS ONCE AS NEEDED
Status: DISCONTINUED | OUTPATIENT
Start: 2018-09-12 | End: 2018-09-12 | Stop reason: HOSPADM

## 2018-09-12 RX ORDER — OXYTOCIN-SODIUM CHLORIDE 0.9% IV SOLN 30 UNIT/500ML 30-0.9/5 UT/ML-%
125 SOLUTION INTRAVENOUS CONTINUOUS PRN
Status: COMPLETED | OUTPATIENT
Start: 2018-09-12 | End: 2018-09-12

## 2018-09-12 RX ORDER — PROMETHAZINE HYDROCHLORIDE 25 MG/ML
12.5 INJECTION, SOLUTION INTRAMUSCULAR; INTRAVENOUS EVERY 4 HOURS PRN
Status: DISCONTINUED | OUTPATIENT
Start: 2018-09-12 | End: 2018-09-14 | Stop reason: HOSPADM

## 2018-09-12 RX ORDER — ONDANSETRON 4 MG/1
4 TABLET, FILM COATED ORAL EVERY 8 HOURS PRN
Status: DISCONTINUED | OUTPATIENT
Start: 2018-09-12 | End: 2018-09-14 | Stop reason: HOSPADM

## 2018-09-12 RX ORDER — ONDANSETRON 2 MG/ML
4 INJECTION INTRAMUSCULAR; INTRAVENOUS EVERY 6 HOURS PRN
Status: DISCONTINUED | OUTPATIENT
Start: 2018-09-12 | End: 2018-09-14 | Stop reason: HOSPADM

## 2018-09-12 RX ORDER — PROMETHAZINE HYDROCHLORIDE 12.5 MG/1
12.5 TABLET ORAL EVERY 4 HOURS PRN
Status: DISCONTINUED | OUTPATIENT
Start: 2018-09-12 | End: 2018-09-14 | Stop reason: HOSPADM

## 2018-09-12 RX ORDER — CARBOPROST TROMETHAMINE 250 UG/ML
250 INJECTION, SOLUTION INTRAMUSCULAR ONCE AS NEEDED
Status: DISCONTINUED | OUTPATIENT
Start: 2018-09-12 | End: 2018-09-14 | Stop reason: HOSPADM

## 2018-09-12 RX ORDER — LIDOCAINE HYDROCHLORIDE AND EPINEPHRINE 15; 5 MG/ML; UG/ML
INJECTION, SOLUTION EPIDURAL AS NEEDED
Status: DISCONTINUED | OUTPATIENT
Start: 2018-09-12 | End: 2018-09-12 | Stop reason: SURG

## 2018-09-12 RX ORDER — EPHEDRINE SULFATE 50 MG/ML
10 INJECTION, SOLUTION INTRAVENOUS
Status: DISCONTINUED | OUTPATIENT
Start: 2018-09-12 | End: 2018-09-12 | Stop reason: HOSPADM

## 2018-09-12 RX ORDER — OXYTOCIN-SODIUM CHLORIDE 0.9% IV SOLN 30 UNIT/500ML 30-0.9/5 UT/ML-%
999 SOLUTION INTRAVENOUS ONCE
Status: COMPLETED | OUTPATIENT
Start: 2018-09-12 | End: 2018-09-12

## 2018-09-12 RX ORDER — IBUPROFEN 800 MG/1
800 TABLET ORAL EVERY 8 HOURS PRN
Status: DISCONTINUED | OUTPATIENT
Start: 2018-09-12 | End: 2018-09-14 | Stop reason: HOSPADM

## 2018-09-12 RX ORDER — PHYTONADIONE 1 MG/.5ML
INJECTION, EMULSION INTRAMUSCULAR; INTRAVENOUS; SUBCUTANEOUS
Status: DISPENSED
Start: 2018-09-12 | End: 2018-09-12

## 2018-09-12 RX ORDER — OXYCODONE HYDROCHLORIDE AND ACETAMINOPHEN 5; 325 MG/1; MG/1
2 TABLET ORAL EVERY 4 HOURS PRN
Status: DISCONTINUED | OUTPATIENT
Start: 2018-09-12 | End: 2018-09-14 | Stop reason: HOSPADM

## 2018-09-12 RX ORDER — PROMETHAZINE HYDROCHLORIDE 25 MG/1
25 TABLET ORAL EVERY 6 HOURS PRN
Status: DISCONTINUED | OUTPATIENT
Start: 2018-09-12 | End: 2018-09-14 | Stop reason: HOSPADM

## 2018-09-12 RX ORDER — SODIUM CHLORIDE, SODIUM LACTATE, POTASSIUM CHLORIDE, CALCIUM CHLORIDE 600; 310; 30; 20 MG/100ML; MG/100ML; MG/100ML; MG/100ML
125 INJECTION, SOLUTION INTRAVENOUS CONTINUOUS
Status: DISCONTINUED | OUTPATIENT
Start: 2018-09-12 | End: 2018-09-14 | Stop reason: HOSPADM

## 2018-09-12 RX ORDER — ACETAMINOPHEN 325 MG/1
650 TABLET ORAL ONCE
Status: COMPLETED | OUTPATIENT
Start: 2018-09-12 | End: 2018-09-12

## 2018-09-12 RX ORDER — OXYCODONE AND ACETAMINOPHEN 7.5; 325 MG/1; MG/1
1 TABLET ORAL EVERY 4 HOURS PRN
Status: DISCONTINUED | OUTPATIENT
Start: 2018-09-12 | End: 2018-09-14 | Stop reason: HOSPADM

## 2018-09-12 RX ORDER — BISACODYL 10 MG
10 SUPPOSITORY, RECTAL RECTAL DAILY PRN
Status: DISCONTINUED | OUTPATIENT
Start: 2018-09-13 | End: 2018-09-14 | Stop reason: HOSPADM

## 2018-09-12 RX ORDER — ERYTHROMYCIN 5 MG/G
OINTMENT OPHTHALMIC
Status: DISPENSED
Start: 2018-09-12 | End: 2018-09-12

## 2018-09-12 RX ORDER — DOCUSATE SODIUM 100 MG/1
100 CAPSULE, LIQUID FILLED ORAL 2 TIMES DAILY
Status: DISCONTINUED | OUTPATIENT
Start: 2018-09-12 | End: 2018-09-14 | Stop reason: HOSPADM

## 2018-09-12 RX ORDER — OXYTOCIN-SODIUM CHLORIDE 0.9% IV SOLN 30 UNIT/500ML 30-0.9/5 UT/ML-%
250 SOLUTION INTRAVENOUS CONTINUOUS
Status: ACTIVE | OUTPATIENT
Start: 2018-09-12 | End: 2018-09-12

## 2018-09-12 RX ORDER — MISOPROSTOL 200 UG/1
800 TABLET ORAL AS NEEDED
Status: DISCONTINUED | OUTPATIENT
Start: 2018-09-12 | End: 2018-09-12 | Stop reason: HOSPADM

## 2018-09-12 RX ORDER — SODIUM CHLORIDE 0.9 % (FLUSH) 0.9 %
1-10 SYRINGE (ML) INJECTION AS NEEDED
Status: DISCONTINUED | OUTPATIENT
Start: 2018-09-12 | End: 2018-09-14 | Stop reason: HOSPADM

## 2018-09-12 RX ORDER — CARBOPROST TROMETHAMINE 250 UG/ML
250 INJECTION, SOLUTION INTRAMUSCULAR AS NEEDED
Status: DISCONTINUED | OUTPATIENT
Start: 2018-09-12 | End: 2018-09-12 | Stop reason: HOSPADM

## 2018-09-12 RX ORDER — PROMETHAZINE HYDROCHLORIDE 25 MG/ML
12.5 INJECTION, SOLUTION INTRAMUSCULAR; INTRAVENOUS EVERY 6 HOURS PRN
Status: DISCONTINUED | OUTPATIENT
Start: 2018-09-12 | End: 2018-09-14 | Stop reason: HOSPADM

## 2018-09-12 RX ORDER — LANOLIN 100 %
OINTMENT (GRAM) TOPICAL AS NEEDED
Status: DISCONTINUED | OUTPATIENT
Start: 2018-09-12 | End: 2018-09-14 | Stop reason: HOSPADM

## 2018-09-12 RX ORDER — PROMETHAZINE HYDROCHLORIDE 25 MG/1
12.5 SUPPOSITORY RECTAL EVERY 6 HOURS PRN
Status: DISCONTINUED | OUTPATIENT
Start: 2018-09-12 | End: 2018-09-14 | Stop reason: HOSPADM

## 2018-09-12 RX ORDER — MISOPROSTOL 200 UG/1
600 TABLET ORAL ONCE AS NEEDED
Status: DISCONTINUED | OUTPATIENT
Start: 2018-09-12 | End: 2018-09-14 | Stop reason: HOSPADM

## 2018-09-12 RX ADMIN — DOCUSATE SODIUM 100 MG: 100 CAPSULE, LIQUID FILLED ORAL at 20:22

## 2018-09-12 RX ADMIN — OXYTOCIN 999 ML/HR: 10 INJECTION INTRAVENOUS at 07:16

## 2018-09-12 RX ADMIN — EPHEDRINE SULFATE 10 MG: 50 INJECTION INTRAVENOUS at 05:35

## 2018-09-12 RX ADMIN — SODIUM CHLORIDE, POTASSIUM CHLORIDE, SODIUM LACTATE AND CALCIUM CHLORIDE 999 ML/HR: 600; 310; 30; 20 INJECTION, SOLUTION INTRAVENOUS at 05:40

## 2018-09-12 RX ADMIN — LIDOCAINE HYDROCHLORIDE AND EPINEPHRINE 5 ML: 15; 5 INJECTION, SOLUTION EPIDURAL at 05:20

## 2018-09-12 RX ADMIN — SODIUM CHLORIDE, POTASSIUM CHLORIDE, SODIUM LACTATE AND CALCIUM CHLORIDE 125 ML/HR: 600; 310; 30; 20 INJECTION, SOLUTION INTRAVENOUS at 00:00

## 2018-09-12 RX ADMIN — OXYTOCIN 125 ML/HR: 10 INJECTION, SOLUTION INTRAMUSCULAR; INTRAVENOUS at 08:37

## 2018-09-12 RX ADMIN — EPHEDRINE SULFATE 10 MG: 50 INJECTION INTRAVENOUS at 05:48

## 2018-09-12 RX ADMIN — ACETAMINOPHEN 650 MG: 325 TABLET, FILM COATED ORAL at 01:51

## 2018-09-12 RX ADMIN — BETAMETHASONE VALERATE: 1.2 OINTMENT TOPICAL at 18:22

## 2018-09-12 RX ADMIN — Medication 8 ML/HR: at 05:30

## 2018-09-12 RX ADMIN — BUTORPHANOL TARTRATE 2 MG: 2 INJECTION, SOLUTION INTRAMUSCULAR; INTRAVENOUS at 03:06

## 2018-09-12 RX ADMIN — SODIUM CHLORIDE, POTASSIUM CHLORIDE, SODIUM LACTATE AND CALCIUM CHLORIDE 125 ML/HR: 600; 310; 30; 20 INJECTION, SOLUTION INTRAVENOUS at 03:30

## 2018-09-12 RX ADMIN — IBUPROFEN 800 MG: 800 TABLET ORAL at 23:48

## 2018-09-12 NOTE — ANESTHESIA PROCEDURE NOTES
Labor Epidural    Patient location during procedure: OB  Start Time: 9/12/2018 5:12 AM  Stop Time: 9/12/2018 5:34 AM  Performed By  Anesthesiologist: NICO DIA  Preanesthetic Checklist  Completed: patient identified, site marked, surgical consent, pre-op evaluation, timeout performed, IV checked, risks and benefits discussed and monitors and equipment checked  Prep:  Pt Position:sitting  Sterile Tech:cap, gloves, mask and sterile barrier  Prep:povidone-iodine 7.5% surgical scrub  Monitoring:blood pressure monitoring, continuous pulse oximetry and EKG  Epidural Block Procedure:  Approach:midline  Guidance:landmark technique and palpation technique  Location:L4-L5  Needle Type:Tuohy  Needle Gauge:17  Loss of Resistance Medium: air  Paresthesia: none  Aspiration:negative  Test Dose:negative  Number of Attempts: 1  Post Assessment:  Dressing:occlusive dressing applied and secured with tape  Pt Tolerance:patient tolerated the procedure well with no apparent complications  Complications:no

## 2018-09-12 NOTE — ANESTHESIA POSTPROCEDURE EVALUATION
Patient: Ynes ZHOU Sandra    Procedure Summary     Date:  09/12/18 Room / Location:      Anesthesia Start:  0512 Anesthesia Stop:  0714    Procedure:  LABOR ANALGESIA Diagnosis:      Scheduled Providers:   Provider:  Braydon Oh MD    Anesthesia Type:  epidural ASA Status:  2          Anesthesia Type: epidural  Last vitals  BP   135/80 (09/12/18 0800)   Temp   36.4 °C (97.6 °F) (09/12/18 0300)   Pulse   107 (09/12/18 0800)   Resp   17 (09/12/18 0800)     SpO2   99 % (09/11/18 2307)     Post Anesthesia Care and Evaluation    Anesthetic complications: No anesthetic complications

## 2018-09-12 NOTE — L&D DELIVERY NOTE
2018    Patient:Ynes Flores    MR#:[id number]    Vaginal Delivery Note  32 y.o. yo female  at 39w2d admitted for IOL for poor fetal growth     Patient Active Problem List   Diagnosis   • Increased BMI   • Poor fetal growth, affecting management of mother, antepartum condition or complication       Delivery     Delivery: Vaginal, Spontaneous Delivery     YOB: 2018    Time of Birth: 7:14 AM      Anesthesia: Epidural     Delivering clinician: Arthur Morales    Forceps?   No   Vacuum? No    Shoulder dystocia present: No        Delivery narrative:  The patient is admitted for IOL for poor fetal growth.  Cervidil was placed per protocol.  The patient entered an active phase of labor and progressed along a normal labor curve to complete dilatation at +1 station with spontaneous ROM    The patient precipitously delivered a live viable male infant occiput anterior with restitution to occiput right while I was in the room gowning.  The anterior and posterior shoulder delivered without difficulty.  No nuchal cord was identified. The body was delivered atraumatically.  The infant's nose and oropharynx were suctioned and cleared of secretions.  Cord clamping was delayed a minimum of 30 seconds then was clamped and cut.  The infant was placed on the mom's chest for bonding and care.    The placenta was expressed and delivered intact.      EBL: 200    Summary:  Uncomplicated Vaginal delivery      Infant    Findings: male  infant     Infant observations: Weight: 2702 g (5 lb 15.3 oz)     Observations/Comments:  scale 4      Apgars: 8   @ 1 minute /    9   @ 5 minutes   Infant Name: Vladimir      Placenta, Cord, and Fluid    Placenta delivered  Manual removal  at  2018  7:16 AM     Cord: 3 vessels  present.   Nuchal Cord?  no   Cord blood obtained: Yes    Cord gases obtained:  Yes    Cord gas results: Lab Results   Component Value Date    PHCVEN 7.317 2018            Repair    Episiotomy:  No   Lacerations: Yes  Laceration Information  Laceration Repaired?   Perineal: 1st  Yes    Periurethral:         Labial:         Sulcus:         Vaginal: No       Cervical: No          Suture used for repair: 3-0 chromic gut   Estimated Blood Loss: 200  mls.         Complications  precipitous delivery    Disposition  Mother to Mother Baby/Postpartum  in stable condition currently.  Baby to NBN  in stable condition currently.    [x]  Labs reviewed:  Bld B pos    Rubella imm  Varicella imm per history   Immunization History   Administered Date(s) Administered   • Tdap 07/03/2018           Arthur Morales MD  09/12/18  8:24 AM

## 2018-09-12 NOTE — LACTATION NOTE
Baby BF on right breast with nipple shield. Mom has bright red blood in nipple shield from crack in nipple. APNO ordered. Set up hgp and encouraged mom to pump after feeds and supplement baby. Baby's bgm's are ok at this time.     Lactation Consult Note    Evaluation Completed: 2018 4:50 PM  Patient Name: Ynes Flores  :  1985  MRN:  [id number]     REFERRAL  INFORMATION:                                         DELIVERY HISTORY:          Skin to skin initiation date/time: 2018  7:18 AM   Skin to skin end date/time:              MATERNAL ASSESSMENT:                               INFANT ASSESSMENT:  Information for the patient's :  Lashay Flores [[id number]]   No past medical history on file.    Feeding Readiness Cues: rooting (18 : Marsha Earl RN)   Feeding Method: breastfeeding (18 : Marsha Earl RN)   Feeding Tolerance/Success: coordinated suck, coordinated swallow, strong suck, arousal required, adequate pause for breath (with using nipple shield 24 mm) (18 : Marsha Earl RN)                   Feeding Interventions: latch assistance provided (18 : Marsha Earl RN)       Additional Documentation: LATCH Score (Group) (18 : Marsha Earl RN)               Infant Positioning: cradle (18 : Marsha Earl RN)           Effective Latch During Feeding: yes (with nipple shield) (18 : Marsha Earl RN)               Latch: 0-->too sleepy or reluctant, no latch achieved (before using nipple shield) (18 : Marsha Earl RN)   Audible Swallowin-->none (18 : Marsha Earl RN)   Type of Nipple: 0-->inverted (18 : Marsha Earl RN)   Comfort (Breast/Nipple): 2-->soft/nontender (18 : Marsha Earl, RN)   Hold (Positioning): 1-->minimal assist, teach one side, mother does other, staff holds  (09/12/18 0900 : Marsha Earl RN)   Score: 3 (09/12/18 0900 : Marsha Earl RN)                       MATERNAL INFANT FEEDING:                                                                       EQUIPMENT TYPE:                                 BREAST PUMPING:          LACTATION REFERRALS:

## 2018-09-12 NOTE — LACTATION NOTE
This note was copied from a baby's chart.  P2. Mom reports she had trouble BF her first baby because she has folded/inverted nipples. Educated mom and dad with stimulating and stretching right nipple. nipple became everted but baby was unable to maintain latch. Left nipple is more difficult to fabiola. Mom used NS with first baby. Baby does latch to NS and is nursing on right side. Educated on proper use on NS and encouraged mom to start insurance pumping today and feed back to baby. Mom reports her first baby lost too much weight. Call if needing further assistance.   Lactation Consult Note    Evaluation Completed: 2018 9:05 AM  Patient Name: Lashay Flores  :  2018  MRN:  [id number]     REFERRAL  INFORMATION:                                         DELIVERY HISTORY:  This patient has no babies on file.  This patient has no babies on file.  Skin to skin initiation date/time: 2018 7:18 AM  Skin to skin end date/time:      This patient has no babies on file.    MATERNAL ASSESSMENT:                               INFANT ASSESSMENT:  This patient has no babies on file.  This patient has no babies on file.  This patient has no babies on file.  This patient has no babies on file.  This patient has no babies on file.  This patient has no babies on file.  This patient has no babies on file.  This patient has no babies on file.  This patient has no babies on file.  This patient has no babies on file.  This patient has no babies on file.  This patient has no babies on file.  This patient has no babies on file.  This patient has no babies on file.  This patient has no babies on file.  This patient has no babies on file.  This patient has no babies on file.  This patient has no babies on file.  This patient has no babies on file.  This patient has no babies on file.      This patient has no babies on file.  This patient has no babies on file.  This patient has no babies on file.  This patient has no babies on  file.  This patient has no babies on file.  This patient has no babies on file.    This patient has no babies on file.  This patient has no babies on file.  This patient has no babies on file.        MATERNAL INFANT FEEDING:        Infant Positioning: cradle (09/12/18 0900 : Marsha Earl RN)                                                              EQUIPMENT TYPE:                                 BREAST PUMPING:          LACTATION REFERRALS:

## 2018-09-12 NOTE — H&P
History & Physical    2018    Patient: Ynes Flores          MR#:[id number]    Chief complaint:  IOL    Subjective     Patient is a 32 y.o. female  at 39w2d presents for induction of labor with term fetus and favorable cervix.  Growth Ultrasound remarkable for EFW 7-1 with AC at 5%ile.  Patient reports feeling well without complaints on admission    Periodic variable decelerations noted early with recovery.  Current tracing with some diminished variability after receiving stadol at 0306.  Patient report regular painful contractions every 4-5 not tracing on monitor     Prenatal care complicated by items listed in the problem list below    Patient Active Problem List   Diagnosis   • Increased BMI   • Poor fetal growth, affecting management of mother, antepartum condition or complication       Past Medical History:   Diagnosis Date   • Vaginal delivery        Past Surgical History:   Procedure Laterality Date   • WISDOM TOOTH EXTRACTION         Obstetric History:  OB History      Para Term  AB Living    2 1 1     1    SAB TAB Ectopic Molar Multiple Live Births              1         Menstrual History:     Patient's last menstrual period was 2017.       #: 1, Date: 10/26/15, Sex: Male, Weight: 3827 g (8 lb 7 oz), GA: 39w0d, Delivery: Vaginal, Spontaneous Delivery, Apgar1: None, Apgar5: None, Living: Living, Birth Comments: None    #: 2, Date: None, Sex: None, Weight: None, GA: None, Delivery: None, Apgar1: None, Apgar5: None, Living: None, Birth Comments: None      Family History   Problem Relation Age of Onset   • Ovarian cancer Grandchild    • Ovarian cancer Grandchild    • Thyroid cancer Mother    • Diabetes Mother        Social History   Substance Use Topics   • Smoking status: Never Smoker   • Smokeless tobacco: Never Used   • Alcohol use No       Amoxicillin; Cefdinir; and Penicillins      Current Facility-Administered Medications:   •  diphenhydrAMINE (BENADRYL) injection 12.5  mg, 12.5 mg, Intravenous, Q8H PRN, Braydon Oh MD  •  ePHEDrine injection 5 mg, 5 mg, Intravenous, PRN, Braydon Oh MD  •  famotidine (PEPCID) injection 20 mg, 20 mg, Intravenous, Once PRN, Braydon Oh MD  •  lactated ringers infusion, 125 mL/hr, Intravenous, Continuous, Arthur Morales MD, Last Rate: 125 mL/hr at 09/12/18 0330, 125 mL/hr at 09/12/18 0330  •  ondansetron (ZOFRAN) injection 4 mg, 4 mg, Intravenous, Once PRN, Braydon Oh MD  •  SUFentanil (0.5 mcg/mL) and ropivacaine (0.2%) Epidural 100 mL, 10 mL/hr, Epidural, Continuous, Braydon Oh MD    Review of Systems  Review of Systems   Constitutional: Negative.    Eyes: Negative for visual disturbance.   Respiratory: Negative.    Cardiovascular: Negative.    Gastrointestinal: Negative.    Genitourinary: Negative.    Neurological: Negative for headaches.   Psychiatric/Behavioral: Negative.        Objective     Vital Signs  Temp:  [97.6 °F (36.4 °C)] 97.6 °F (36.4 °C)  Heart Rate:  [] 96  Resp:  [18-20] 20  BP: ()/(55-83) 130/64    Physical Exam:  Physical Exam   Constitutional: She is oriented to person, place, and time. She appears well-developed and well-nourished.   HENT:   Head: Normocephalic and atraumatic.   Cardiovascular: Normal rate.    Pulmonary/Chest: Effort normal.   Abdominal: Soft. Bowel sounds are normal. She exhibits no distension. There is no tenderness.   Neurological: She is alert and oriented to person, place, and time. She displays normal reflexes.   Skin: Skin is warm and dry.   Psychiatric: She has a normal mood and affect. Her behavior is normal. Judgment and thought content normal.   Nursing note and vitals reviewed.      Labs:  Lab Results (last 24 hours)     Procedure Component Value Units Date/Time    CBC (No Diff) [[id number]]  (Abnormal) Collected:  09/11/18 2255    Specimen:  Blood Updated:  09/11/18 2303     WBC 12.07 (H) 10*3/mm3      RBC 4.17 10*6/mm3       Hemoglobin 11.9 g/dL      Hematocrit 37.6 %      MCV 90.2 fL      MCH 28.5 pg      MCHC 31.6 (L) g/dL      RDW 13.8 (H) %      RDW-SD 44.8 fl      MPV 11.1 fL      Platelets 273 10*3/mm3             Assessment/Plan     1.  Intrauterine Pregnancy at 39w2d  2.  IOL for impaired fetal growth  3.  Obesity in pregnancy      Active Problems:    Poor fetal growth, affecting management of mother, antepartum condition or complication      Reviewed the procedure with patient.  I discussed the risks including but not limited to bleeding, infection and damage to internal organs.  Understanding of the procedure is voiced.     Arthur Morales MD  09/12/18  4:29 AM      Patient Care Team:  Sirena Fournier PA as PCP - General (Physician Assistant)

## 2018-09-12 NOTE — ANESTHESIA PREPROCEDURE EVALUATION
Anesthesia Evaluation     Patient summary reviewed and Nursing notes reviewed   no history of anesthetic complications:               Airway   Mallampati: II  TM distance: >3 FB  Neck ROM: full  no difficulty expected  Dental - normal exam     Pulmonary - negative pulmonary ROS    breath sounds clear to auscultation  (-) shortness of breath, sleep apnea, decreased breath sounds, wheezes  Cardiovascular - normal exam  Exercise tolerance: good (4-7 METS)    Rhythm: regular  Rate: normal    (-) past MI, angina, CHF, orthopnea, PND, COSME, PVD      Neuro/Psych- negative ROS  (-) seizures, neuromuscular disease, TIA, CVA, dizziness/light headedness, weakness, numbness  GI/Hepatic/Renal/Endo - negative ROS   (-) liver disease, diabetes    Musculoskeletal (-) negative ROS    Abdominal  - normal exam   Substance History - negative use  (-) alcohol use, drug use     OB/GYN    (+) Pregnant (),         Other - negative ROS                       Anesthesia Plan    ASA 2     epidural     Anesthetic plan, all risks, benefits, and alternatives have been provided, discussed and informed consent has been obtained with: patient and spouse.

## 2018-09-12 NOTE — PLAN OF CARE
Problem: Patient Care Overview  Goal: Plan of Care Review  Outcome: Ongoing (interventions implemented as appropriate)   18 0850   Coping/Psychosocial   Plan of Care Reviewed With patient;spouse   Plan of Care Review   Progress improving   OTHER   Outcome Summary Pt currently breastfeeding, denies pain. Will transport to mother baby after recovery is complete      Goal: Individualization and Mutuality  Outcome: Ongoing (interventions implemented as appropriate)   18 0850   Individualization   Patient Specific Preferences ELY, epidural, breastfeeding   Patient Specific Goals (Include Timeframe) healthy mom and baby at time of delivery   Patient Specific Interventions epidural   Mutuality/Individual Preferences   What Anxieties, Fears, Concerns, or Questions Do You Have About Your Care? breastfeeding, pt states she has inverted nipples and had difficulty with first baby   How Would You and/or Your Support Person Like to Participate in Your Care? FOB to be here for delivery    Mutuality/Individual Preferences   How to Address Anxieties/Fears care explained, questions answered. Lactation paged       Problem: Labor (Cervical Ripen, Induct, Augment) (Adult,Obstetrics,Pediatric)  Goal: Signs and Symptoms of Listed Potential Problems Will be Absent, Minimized or Managed (Labor)  Outcome: Ongoing (interventions implemented as appropriate)   18 0850   Goal/Outcome Evaluation   Problems Assessed (Labor) all   Problems Present (Labor) none       Problem: Fall Risk,  (Adult,Obstetrics,Pediatric)  Goal: Identify Related Risk Factors and Signs and Symptoms  Outcome: Ongoing (interventions implemented as appropriate)   18 0850   Fall Risk,  (Adult,Obstetrics,Pediatric)   Related Risk Factors (Fall Risk, ) regional anesthesia   Signs and Symptoms (Fall Risk, ) presence of fall risk factors     Goal: Absence of Maternal Fall  Outcome: Ongoing (interventions implemented as  appropriate)   18 0850   Fall Risk,  (Adult,Obstetrics,Pediatric)   Absence of Maternal Fall making progress toward outcome     Goal: Absence of [place] Fall/Drop  Outcome: Ongoing (interventions implemented as appropriate)   18 0850   Fall Risk,  (Adult,Obstetrics,Pediatric)   Absence of [place] Fall/Drop making progress toward outcome       Problem: Skin Injury Risk (Adult)  Goal: Identify Related Risk Factors and Signs and Symptoms  Outcome: Ongoing (interventions implemented as appropriate)   18 0850   Skin Injury Risk (Adult)   Related Risk Factors (Skin Injury Risk) mobility impaired     Goal: Skin Health and Integrity  Outcome: Ongoing (interventions implemented as appropriate)   18 0850   Skin Injury Risk (Adult)   Skin Health and Integrity making progress toward outcome

## 2018-09-13 LAB
BASOPHILS # BLD AUTO: 0.02 10*3/MM3 (ref 0–0.2)
BASOPHILS NFR BLD AUTO: 0.2 % (ref 0–1.5)
DEPRECATED RDW RBC AUTO: 46 FL (ref 37–54)
EOSINOPHIL # BLD AUTO: 0.08 10*3/MM3 (ref 0–0.7)
EOSINOPHIL NFR BLD AUTO: 0.6 % (ref 0.3–6.2)
ERYTHROCYTE [DISTWIDTH] IN BLOOD BY AUTOMATED COUNT: 14 % (ref 11.7–13)
HCT VFR BLD AUTO: 34.4 % (ref 35.6–45.5)
HGB BLD-MCNC: 10.7 G/DL (ref 11.9–15.5)
IMM GRANULOCYTES # BLD: 0.07 10*3/MM3 (ref 0–0.03)
IMM GRANULOCYTES NFR BLD: 0.5 % (ref 0–0.5)
LYMPHOCYTES # BLD AUTO: 3.26 10*3/MM3 (ref 0.9–4.8)
LYMPHOCYTES NFR BLD AUTO: 25.5 % (ref 19.6–45.3)
MCH RBC QN AUTO: 28.4 PG (ref 26.9–32)
MCHC RBC AUTO-ENTMCNC: 31.1 G/DL (ref 32.4–36.3)
MCV RBC AUTO: 91.2 FL (ref 80.5–98.2)
MONOCYTES # BLD AUTO: 0.86 10*3/MM3 (ref 0.2–1.2)
MONOCYTES NFR BLD AUTO: 6.7 % (ref 5–12)
NEUTROPHILS # BLD AUTO: 8.48 10*3/MM3 (ref 1.9–8.1)
NEUTROPHILS NFR BLD AUTO: 66.5 % (ref 42.7–76)
PLATELET # BLD AUTO: 241 10*3/MM3 (ref 140–500)
PMV BLD AUTO: 10.9 FL (ref 6–12)
RBC # BLD AUTO: 3.77 10*6/MM3 (ref 3.9–5.2)
WBC NRBC COR # BLD: 12.77 10*3/MM3 (ref 4.5–10.7)

## 2018-09-13 PROCEDURE — 85025 COMPLETE CBC W/AUTO DIFF WBC: CPT | Performed by: OBSTETRICS & GYNECOLOGY

## 2018-09-13 RX ADMIN — IBUPROFEN 800 MG: 800 TABLET ORAL at 17:30

## 2018-09-13 RX ADMIN — IBUPROFEN 800 MG: 800 TABLET ORAL at 09:17

## 2018-09-13 RX ADMIN — DOCUSATE SODIUM 100 MG: 100 CAPSULE, LIQUID FILLED ORAL at 09:17

## 2018-09-13 NOTE — PROGRESS NOTES
Postpartum  Day#1    Subjective:    Chief Complaint   Patient presents with   • pregnanacy     IOL/Cervidil     Pt doing well. Pain well controlled. Lochia normal. Ambulating well. Tolerating regular diet. Voiding without difficulty. No complaints    Vitals:   LASTWT(3)@  Temp Readings from Last 3 Encounters:   09/13/18 98 °F (36.7 °C) (Oral)   09/23/17 99.3 °F (37.4 °C) (Oral)     BP Readings from Last 3 Encounters:   09/13/18 110/74   09/11/18 120/78   09/04/18 110/72     Pulse Readings from Last 3 Encounters:   09/13/18 81   09/23/17 110   ROS:      ROS:Pulm: neg for soa  GI: neg for heavy bleeding              Musculoskel: neg for leg pain        LABS:  Lab Results (last 24 hours)     Procedure Component Value Units Date/Time    CBC & Differential [[id number]] Collected:  09/13/18 0529    Specimen:  Blood Updated:  09/13/18 0625    Narrative:       The following orders were created for panel order CBC & Differential.  Procedure                               Abnormality         Status                     ---------                               -----------         ------                     CBC Auto Differential[[id number]]        Abnormal            Final result                 Please view results for these tests on the individual orders.    CBC Auto Differential [[id number]]  (Abnormal) Collected:  09/13/18 0529    Specimen:  Blood Updated:  09/13/18 0625     WBC 12.77 (H) 10*3/mm3      RBC 3.77 (L) 10*6/mm3      Hemoglobin 10.7 (L) g/dL      Hematocrit 34.4 (L) %      MCV 91.2 fL      MCH 28.4 pg      MCHC 31.1 (L) g/dL      RDW 14.0 (H) %      RDW-SD 46.0 fl      MPV 10.9 fL      Platelets 241 10*3/mm3      Neutrophil % 66.5 %      Lymphocyte % 25.5 %      Monocyte % 6.7 %      Eosinophil % 0.6 %      Basophil % 0.2 %      Immature Grans % 0.5 %      Neutrophils, Absolute 8.48 (H) 10*3/mm3      Lymphocytes, Absolute 3.26 10*3/mm3      Monocytes, Absolute 0.86 10*3/mm3      Eosinophils,  Absolute 0.08 10*3/mm3      Basophils, Absolute 0.02 10*3/mm3      Immature Grans, Absolute 0.07 (H) 10*3/mm3           Exam:   Gen: NAD, cooperative, conversive  Abd: Soft, nondistended, fundus is firm below umbillicus, approp tender  Ext: Nontender bilaterally  Lochia normal        Assessment::   Ynes Flores is a 32 y.o. female  s/p Vaginal, Spontaneous Delivery   Active Problems:    Poor fetal growth, affecting management of mother, antepartum condition or complication  Overview:  Resolved Problems:    * No resolved hospital problems. *      1. PPD#1 , Doing well, Pt. Request Circ for son. R/B/A discussed   2. Precautions given  3. Routine post partum  care discussed  4. Ambulation encouraged.         leonel Benton MD     2018 9:27 AM

## 2018-09-13 NOTE — LACTATION NOTE
This note was copied from a baby's chart.  Breast feeding going better now per Mom. She has been latching using nipple shield. Encouraged insurance pumping and feeding all ebm to baby and to call if needing assistance.

## 2018-09-14 VITALS
WEIGHT: 211.13 LBS | TEMPERATURE: 98.1 F | DIASTOLIC BLOOD PRESSURE: 79 MMHG | RESPIRATION RATE: 18 BRPM | HEIGHT: 64 IN | OXYGEN SATURATION: 99 % | BODY MASS INDEX: 36.05 KG/M2 | HEART RATE: 80 BPM | SYSTOLIC BLOOD PRESSURE: 117 MMHG

## 2018-09-14 RX ORDER — OXYCODONE HYDROCHLORIDE AND ACETAMINOPHEN 5; 325 MG/1; MG/1
1 TABLET ORAL EVERY 4 HOURS PRN
Qty: 15 TABLET | Refills: 0 | Status: SHIPPED | OUTPATIENT
Start: 2018-09-14 | End: 2018-09-22

## 2018-09-14 RX ORDER — IBUPROFEN 800 MG/1
800 TABLET ORAL EVERY 8 HOURS PRN
Qty: 30 TABLET | Refills: 0 | Status: SHIPPED | OUTPATIENT
Start: 2018-09-14 | End: 2020-10-16

## 2018-09-14 RX ADMIN — DOCUSATE SODIUM 100 MG: 100 CAPSULE, LIQUID FILLED ORAL at 08:37

## 2018-09-14 RX ADMIN — IBUPROFEN 800 MG: 800 TABLET ORAL at 08:36

## 2018-09-14 NOTE — PROGRESS NOTES
2018    Name:Ynes Flores   MR#:[id number]    Vaginal Delivery Progress Note    HD#3    Subjective   Postpartum Day 2: 32 y.o. yo Female  delivered at 39w2d  delivered a male  infant.     The patient feels well.  Her pain is controlled.    She ambulating well.  Patient describes her bleeding as thin lochia.    Breastfeeding: without difficulty.     Patient Active Problem List   Diagnosis   • Increased BMI   • Poor fetal growth, affecting management of mother, antepartum condition or complication       Objective   Vital Signs Range for the last 24 hours  Temp: Temp:  [97.7 °F (36.5 °C)-98.1 °F (36.7 °C)] 98.1 °F (36.7 °C) Temp src: Oral   BP: BP: (114-119)/(76-79) 117/79        Pulse: Heart Rate:  [80-89] 80  RR: Resp:  [16-18] 18  Weight: 95.8 kg (211 lb 2 oz)  BMI:  Body mass index is 36.22 kg/m².      Lab Results   Component Value Date    WBC 12.77 (H) 2018    HGB 10.7 (L) 2018    HCT 34.4 (L) 2018    MCV 91.2 2018     2018       Physical Exam  General:  no acute distresss.  Abdomen: Fundus: appropriate, firm, non tender Fundus: Firm with scant lochia  Extremities: no cyanosis, and no edema, no CT    Perineum:  Intact    Assessment/Plan   1.  PPD# 2  2. Male infant - s/p circ with no void - pediatrician to see        Plan:  Discharge home     Marsha Estrada MD  2018 10:07 AM

## 2018-09-14 NOTE — DISCHARGE SUMMARY
Vaginal delivery Discharge Summary      Date of Admission: 2018    Date of Discharge:  2018    Patient: Ynes Flores      MR#:[id number]    Surgeon/OB: Arthur Morales     Discharge Diagnosis: Vaginal Delivery at 39w2d, uncomplicated recovery    Procedures:  Vaginal, Spontaneous Delivery     2018    7:14 AM      Anesthesia:  Epidural     Presenting Problem/History of Present Illness  Poor fetal growth affecting management of mother, antepartum, single or unspecified fetus [O36.5990]  Normal vaginal delivery [O80]     Patient Active Problem List   Diagnosis   • Increased BMI   • Poor fetal growth, affecting management of mother, antepartum condition or complication   • Normal vaginal delivery       Hospital Course  Patient is a 32 y.o. female  at 39w2d status post vaginal delivery.    Uneventful recovery.  Patient is ambulating, tolerating a regular diet.  Perineum is intact.    Infant:   male  fetus 2702 g (5 lb 15.3 oz)  with Apgar scores of 8  , 9   at five minutes.    Condition on Discharge:  Stable    Vital Signs  Temp:  [97.7 °F (36.5 °C)-98.1 °F (36.7 °C)] 98.1 °F (36.7 °C)  Heart Rate:  [80-89] 80  Resp:  [16-18] 18  BP: (114-119)/(76-79) 117/79    Lab Results   Component Value Date    WBC 12.77 (H) 2018    HGB 10.7 (L) 2018    HCT 34.4 (L) 2018    MCV 91.2 2018     2018       Discharge Disposition  Home or Self Care    Discharge Medications     Discharge Medications      New Medications      Instructions Start Date   ibuprofen 800 MG tablet  Commonly known as:  ADVIL,MOTRIN   800 mg, Oral, Every 8 Hours PRN      oxyCODONE-acetaminophen 5-325 MG per tablet  Commonly known as:  PERCOCET   1 tablet, Oral, Every 4 Hours PRN         Continue These Medications      Instructions Start Date   guaiFENesin 600 MG 12 hr tablet  Commonly known as:  MUCINEX   1,200 mg, Oral, 2 Times Daily      prenatal vitamin 27-0.8 27-0.8 MG tablet tablet   Oral, Daily       PROAIR  (90 Base) MCG/ACT inhaler  Generic drug:  albuterol   No dose, route, or frequency recorded.      Vitamin C 250 MG chewable tablet   Oral             Discharge Diet: Regular    Activity at Discharge:   Activity Instructions     Activity as Tolerated       Pelvic Rest       6 weeks          Follow-up Appointments  No future appointments.  Additional Instructions for the Follow-ups that You Need to Schedule     Call MD for problems / concerns.    As directed      Instructions: Call for temp>101, vaginal bleeding greater than one pad/hour, severe pain or other concerns.    Order Comments:  Instructions: Call for temp>101, vaginal bleeding greater than one pad/hour, severe pain or other concerns.          Discharge Follow-up with Specialty: Dr. Shelton; 1 Month    As directed      Specialty:  Dr. Shelton    Follow Up:  1 Month                     Marsha Estrada MD  09/14/18  10:18 AM

## 2018-09-18 ENCOUNTER — TELEPHONE (OUTPATIENT)
Dept: LACTATION | Facility: HOSPITAL | Age: 33
End: 2018-09-18

## 2018-10-22 ENCOUNTER — POSTPARTUM VISIT (OUTPATIENT)
Dept: OBSTETRICS AND GYNECOLOGY | Age: 33
End: 2018-10-22

## 2018-10-22 VITALS
BODY MASS INDEX: 34.15 KG/M2 | DIASTOLIC BLOOD PRESSURE: 80 MMHG | SYSTOLIC BLOOD PRESSURE: 122 MMHG | HEIGHT: 64 IN | WEIGHT: 200 LBS

## 2018-10-22 DIAGNOSIS — O36.5990 POOR FETAL GROWTH AFFECTING MANAGEMENT OF MOTHER, ANTEPARTUM, SINGLE OR UNSPECIFIED FETUS: ICD-10-CM

## 2018-10-22 PROCEDURE — 0503F POSTPARTUM CARE VISIT: CPT | Performed by: OBSTETRICS & GYNECOLOGY

## 2018-10-22 NOTE — PROGRESS NOTES
"Mathew Flores is a 32 y.o. female who presents for a postpartum visit. She is 6 weeks postpartum following a spontaneous vaginal delivery. I have fully reviewed the prenatal and intrapartum course. The delivery was at 39 gestational weeks. Outcome: spontaneous vaginal delivery. Anesthesia: epidural. Postpartum course has been unremarkable. Baby's course has been without issues. Baby is feeding by breast. Bleeding no bleeding. Bowel function is normal. Bladder function is normal. Patient is not sexually active. Contraception method is condoms. Postpartum depression screening: negative.    The following portions of the patient's history were reviewed and updated as appropriate: allergies, current medications, past family history, past medical history, past social history, past surgical history and problem list.    Review of Systems  Pertinent items are noted in HPI.    Objective   /80   Ht 162.6 cm (64\")   Wt 90.7 kg (200 lb)   LMP 12/01/2017   Breastfeeding? Yes   BMI 34.33 kg/m²    General:  alert, appears stated age, cooperative and no distress    Breasts:  deferred due to breastfeeding   Lungs: clear to auscultation bilaterally   Heart:  regular rate and rhythm, S1, S2 normal, no murmur, click, rub or gallop   Abdomen: soft, non-tender; bowel sounds normal; no masses,  no organomegaly    Vulva:  normal   Vagina: normal vagina, no discharge, exudate, lesion, or erythema   Cervix:  no lesions   Corpus: normal size, contour, position, consistency, mobility, non-tender   Adnexa:  normal adnexa and no mass, fullness, tenderness   Rectal Exam: Not performed.     Assessment/Plan   Normal postpartum exam. Pap smear not done at today's visit.    1. Contraception: condoms  2. Reviewed placental pathology with decidual vasculopathy. Pt has no family hx of blood clots, preeclampsia or fetal loss/IUGR. Prior infant was normally grown. Recommended close observation for IUGR with future pregnancy and " baby asa at 13+ weeks  3. Follow up in: 6 months or as needed.

## 2019-04-23 ENCOUNTER — OFFICE VISIT (OUTPATIENT)
Dept: OBSTETRICS AND GYNECOLOGY | Age: 34
End: 2019-04-23

## 2019-04-23 VITALS
BODY MASS INDEX: 36.47 KG/M2 | DIASTOLIC BLOOD PRESSURE: 70 MMHG | HEIGHT: 64 IN | WEIGHT: 213.6 LBS | SYSTOLIC BLOOD PRESSURE: 110 MMHG

## 2019-04-23 DIAGNOSIS — Z01.419 ENCOUNTER FOR GYNECOLOGICAL EXAMINATION: Primary | ICD-10-CM

## 2019-04-23 PROBLEM — O36.5990 POOR FETAL GROWTH, AFFECTING MANAGEMENT OF MOTHER, ANTEPARTUM CONDITION OR COMPLICATION: Status: RESOLVED | Noted: 2018-09-11 | Resolved: 2019-04-23

## 2019-04-23 PROCEDURE — 99395 PREV VISIT EST AGE 18-39: CPT | Performed by: OBSTETRICS & GYNECOLOGY

## 2019-04-23 NOTE — PROGRESS NOTES
"Subjective     Chief Complaint   Patient presents with   • Gynecologic Exam     AE  Last pap 18-, HPV       History of Present Illness    Ynes Flores is a 33 y.o.  who presents for annual exam.  Pt is still breastfeeding and amenorrheic, using condoms for contraception   Obstetric History:  OB History      Para Term  AB Living    2 2 2     2    SAB TAB Ectopic Molar Multiple Live Births            0 2         Menstrual History:     No LMP recorded (lmp unknown). Patient is not currently having periods (Reason: Other).         Current contraception: condoms  History of abnormal Pap smear: no  Received Gardasil immunization: no  Perform regular self breast exam: yes - reg  Family history of uterine or ovarian cancer: yes - ovary cancer in great GM and great aunt  Family History of colon cancer: no  Family history of breast cancer: no    Mammogram: not indicated.  Colonoscopy: not indicated.  DEXA: not indicated.    Exercise: moderately active  Calcium/Vitamin D: adequate intake    The following portions of the patient's history were reviewed and updated as appropriate: allergies, current medications, past family history, past medical history, past social history, past surgical history and problem list.    Review of Systems    Review of Systems   Constitutional: Negative for fatigue.   Respiratory: Negative for shortness of breath.    Gastrointestinal: Negative for abdominal pain.   Genitourinary: Negative for dysuria. neg for vag bleeding  Neurological: Negative for headaches.   Psychiatric/Behavioral: Negative for dysphoric mood.         Objective   Physical Exam    /70   Ht 162.6 cm (64\")   Wt 96.9 kg (213 lb 9.6 oz)   LMP  (LMP Unknown)   Breastfeeding? Yes   BMI 36.66 kg/m²     General:   alert, appears stated age, cooperative and no distress   Neck: no asymmetry, masses, or scars and thyroid normal to palpation   Heart: regular rate and rhythm, S1, S2 normal, no murmur, " click, rub or gallop   Lungs: clear to auscultation bilaterally   Abdomen: soft, non-tender, without masses or organomegaly   Breast: Inspection negative, no masses, retractions or axillary adenopathy   Vulva: normal, Bartholin's, Urethra, Fridley's normal   Vagina: normal mucosa, normal discharge   Cervix: no lesions   Uterus: normal size, mobile, non-tender, normal shape and consistency   Adnexa: normal adnexa and no mass, fullness, tenderness   Rectal: not indicated     Assessment/Plan   Ynes was seen today for gynecologic exam.    Diagnoses and all orders for this visit:    Encounter for gynecological examination        All questions answered.  Breast self exam technique reviewed and patient encouraged to perform self-exam monthly.  Discussed healthy lifestyle modifications.  Recommended 30 minutes of aerobic exercise five times per week.  Pap deferred, negative pap and hpv 2018, pt agrees    No current issues; pt satisfied with condoms for contraception even after she plans to wean in 6 months    Family hx ovarian cancer; recommended genetic testing; pt considered but declines

## 2020-05-28 PROBLEM — Z87.59 HISTORY OF PRIOR PREGNANCY WITH IUGR NEWBORN: Status: ACTIVE | Noted: 2020-05-28

## 2020-05-28 NOTE — PROGRESS NOTES
"Chief complaint:positive preg test    HPI  Ynes Flores is a 34 y.o. female presents for routine u/s. She reports some minimal nausea but not frequently. She has some mild cramping but no bleeding.         The following portions of the patient's history were reviewed and updated as appropriate: allergies, current medications, past family history, past medical history, past social history, past surgical history and problem list.    Review of Systems  Constitutional: negative for fevers  Respiratory: negative for cough  Gastrointestinal: positive for nausea, negative for abdominal pain and constant vomiting  Genitourinary:negative for vag bleeding    /80   Ht 162.6 cm (64\")   Wt 104 kg (229 lb 12.8 oz)   LMP 2020 (Exact Date)   Breastfeeding No   BMI 39.45 kg/m²         Physical Exam   Constitutional: She is oriented to person, place, and time. She appears well-developed and well-nourished.   HENT:   Head: Normocephalic and atraumatic.   Pulmonary/Chest: Effort normal.   Neurological: She is alert and oriented to person, place, and time.   Skin: Skin is warm and dry.   Psychiatric: She has a normal mood and affect. Her behavior is normal.     tv u/s: viable iup at 7 weeks        Ynes was seen today for follow-up.    Diagnoses and all orders for this visit:    History of prior pregnancy with IUGR   -     Lupus Anticoagulant Panel    Screening for thyroid disorder  -     TSH    Asymptomatic bacteriuria in pregnancy  -     Urine Culture - Urine, Urine, Clean Catch    Encounter to determine fetal viability of pregnancy, single or unspecified fetus  -     OB Panel With HIV  -     POC Pregnancy, Urine    Antepartum multigravida of advanced maternal age    Other orders  -     Discontinue: Prenat w/o A-FeCbGl-DSS-FA-DHA (CITRANATAL ASSURE) 35-1 & 300 MG tablet; Take 1 tablet by mouth Daily.  -     Prenat w/o A-FeCbGl-DSS-FA-DHA (CITRANATAL ASSURE) 35-1 & 300 MG tablet; Take 1 tablet by mouth " Daily. And 1 tablet by mouth daily      Schedule f/u visit for OB intake  Reviewed pnguidelines and instructions  rx pnv  Discussed aneuploidy screening and pt will consider  Recommend baby asa at 13+ weeks due to AMA, increased BMI and placental insuff last pregnancy

## 2020-05-29 ENCOUNTER — PROCEDURE VISIT (OUTPATIENT)
Dept: OBSTETRICS AND GYNECOLOGY | Age: 35
End: 2020-05-29

## 2020-05-29 ENCOUNTER — OFFICE VISIT (OUTPATIENT)
Dept: OBSTETRICS AND GYNECOLOGY | Age: 35
End: 2020-05-29

## 2020-05-29 VITALS
WEIGHT: 229.8 LBS | HEIGHT: 64 IN | SYSTOLIC BLOOD PRESSURE: 124 MMHG | BODY MASS INDEX: 39.23 KG/M2 | DIASTOLIC BLOOD PRESSURE: 80 MMHG

## 2020-05-29 DIAGNOSIS — O36.80X0 ENCOUNTER TO DETERMINE FETAL VIABILITY OF PREGNANCY, SINGLE OR UNSPECIFIED FETUS: Primary | ICD-10-CM

## 2020-05-29 DIAGNOSIS — O99.891 ASYMPTOMATIC BACTERIURIA IN PREGNANCY: ICD-10-CM

## 2020-05-29 DIAGNOSIS — R82.71 ASYMPTOMATIC BACTERIURIA IN PREGNANCY: ICD-10-CM

## 2020-05-29 DIAGNOSIS — O36.80X0 ENCOUNTER TO DETERMINE FETAL VIABILITY OF PREGNANCY, SINGLE OR UNSPECIFIED FETUS: ICD-10-CM

## 2020-05-29 DIAGNOSIS — Z87.59 HISTORY OF PRIOR PREGNANCY WITH IUGR NEWBORN: Primary | ICD-10-CM

## 2020-05-29 DIAGNOSIS — Z13.29 SCREENING FOR THYROID DISORDER: ICD-10-CM

## 2020-05-29 DIAGNOSIS — O09.529 ANTEPARTUM MULTIGRAVIDA OF ADVANCED MATERNAL AGE: ICD-10-CM

## 2020-05-29 LAB
B-HCG UR QL: POSITIVE
INTERNAL NEGATIVE CONTROL: NEGATIVE
INTERNAL POSITIVE CONTROL: POSITIVE
Lab: ABNORMAL

## 2020-05-29 PROCEDURE — 81025 URINE PREGNANCY TEST: CPT | Performed by: OBSTETRICS & GYNECOLOGY

## 2020-05-29 PROCEDURE — 76817 TRANSVAGINAL US OBSTETRIC: CPT | Performed by: OBSTETRICS & GYNECOLOGY

## 2020-05-29 PROCEDURE — 99213 OFFICE O/P EST LOW 20 MIN: CPT | Performed by: OBSTETRICS & GYNECOLOGY

## 2020-05-29 RX ORDER — ASCORBIC ACID, CALCIUM CITRATE, IRON, VITAMIN D, DL- ALPHA- TOCOPHEROL ACETATE, THIAMINE, RIBOFLAVIN, NIACINAMIDE, PYRIDOXINE HYDROCHLORIDE, FOLIC ACID, IODINE, ZINC, COPPER, DOCUSATE SODIUM, DOCONEXENT AND ICOSAPENT
1 KIT DAILY
Qty: 30 TABLET | Refills: 12 | Status: SHIPPED | OUTPATIENT
Start: 2020-05-29 | End: 2020-05-29

## 2020-05-29 RX ORDER — ASCORBIC ACID, CALCIUM CITRATE, IRON, VITAMIN D, DL- ALPHA- TOCOPHEROL ACETATE, THIAMINE, RIBOFLAVIN, NIACINAMIDE, PYRIDOXINE HYDROCHLORIDE, FOLIC ACID, IODINE, ZINC, COPPER, DOCUSATE SODIUM, DOCONEXENT AND ICOSAPENT
1 KIT DAILY
Qty: 60 TABLET | Refills: 12 | Status: SHIPPED | OUTPATIENT
Start: 2020-05-29 | End: 2020-11-04

## 2020-05-31 LAB
BACTERIA UR CULT: NORMAL
BACTERIA UR CULT: NORMAL

## 2020-06-02 ENCOUNTER — TELEPHONE (OUTPATIENT)
Dept: OBSTETRICS AND GYNECOLOGY | Age: 35
End: 2020-06-02

## 2020-06-02 RX ORDER — PNV NO.95/FERROUS FUM/FOLIC AC 28MG-0.8MG
1 TABLET ORAL DAILY
Qty: 30 TABLET | Refills: 12 | Status: SHIPPED | OUTPATIENT
Start: 2020-06-02 | End: 2021-06-15

## 2020-06-02 NOTE — TELEPHONE ENCOUNTER
Pt called, was seen 5/29 and Yesenia w/o A-FeCbGl-DSS-FA-DHA (CITRANATAL ASSURE) 35-1 & 300 MG tablet was prescribed.  Insurance will not cover these vitamins,  Could you please call something else in?    Pharmacy verified.    [phone]

## 2020-06-02 NOTE — TELEPHONE ENCOUNTER
Please call pt, I sent rx for generic pnv that should be covered. She will need to add supplement for DHA if does not include this.

## 2020-06-09 LAB
ABO GROUP BLD: NORMAL
APTT HEX PL PPP: 0 SEC
APTT IMM NP PPP: ABNORMAL SEC
APTT PPP 1:1 SALINE: ABNORMAL SEC
APTT PPP: 21.4 SEC
B2 GLYCOPROT1 IGA SER-ACNC: <10 SAU
B2 GLYCOPROT1 IGG SER-ACNC: <10 SGU
B2 GLYCOPROT1 IGM SER-ACNC: <10 SMU
BASOPHILS # BLD AUTO: 0 X10E3/UL (ref 0–0.2)
BASOPHILS NFR BLD AUTO: 0 %
BLD GP AB SCN SERPL QL: NEGATIVE
CARDIOLIPIN IGG SER IA-ACNC: <10 GPL
CARDIOLIPIN IGM SER IA-ACNC: 12 MPL
CONFIRM APTT: 0 SEC
CONFIRM DRVVT: ABNORMAL SEC
DRVVT SCREEN TO CONFIRM RATIO: ABNORMAL RATIO
EOSINOPHIL # BLD AUTO: 0 X10E3/UL (ref 0–0.4)
EOSINOPHIL NFR BLD AUTO: 1 %
ERYTHROCYTE [DISTWIDTH] IN BLOOD BY AUTOMATED COUNT: 12.7 % (ref 11.7–15.4)
HBV SURFACE AG SERPL QL IA: NEGATIVE
HCT VFR BLD AUTO: 38.4 % (ref 34–46.6)
HCV AB S/CO SERPL IA: <0.1 S/CO RATIO (ref 0–0.9)
HGB BLD-MCNC: 12.8 G/DL (ref 11.1–15.9)
HIV 1+2 AB+HIV1 P24 AG SERPL QL IA: NON REACTIVE
IMM GRANULOCYTES # BLD AUTO: 0 X10E3/UL (ref 0–0.1)
IMM GRANULOCYTES NFR BLD AUTO: 0 %
INR PPP: 0.9 RATIO
LABORATORY COMMENT REPORT: ABNORMAL
LYMPHOCYTES # BLD AUTO: 2 X10E3/UL (ref 0.7–3.1)
LYMPHOCYTES NFR BLD AUTO: 30 %
MCH RBC QN AUTO: 30 PG (ref 26.6–33)
MCHC RBC AUTO-ENTMCNC: 33.3 G/DL (ref 31.5–35.7)
MCV RBC AUTO: 90 FL (ref 79–97)
MONOCYTES # BLD AUTO: 0.3 X10E3/UL (ref 0.1–0.9)
MONOCYTES NFR BLD AUTO: 4 %
NEUTROPHILS # BLD AUTO: 4.5 X10E3/UL (ref 1.4–7)
NEUTROPHILS NFR BLD AUTO: 65 %
PLATELET # BLD AUTO: 315 X10E3/UL (ref 150–450)
PROTHROMBIN TIME: 9.4 SEC
RBC # BLD AUTO: 4.27 X10E6/UL (ref 3.77–5.28)
RH BLD: POSITIVE
RPR SER QL: NON REACTIVE
RUBV IGG SERPL IA-ACNC: 2.44 INDEX
SCREEN DRVVT: 27.3 SEC
THROMBIN TIME: 17.1 SEC
TSH SERPL DL<=0.005 MIU/L-ACNC: 0.98 UIU/ML (ref 0.27–4.2)
WBC # BLD AUTO: 6.9 X10E3/UL (ref 3.4–10.8)

## 2020-06-10 ENCOUNTER — TELEPHONE (OUTPATIENT)
Dept: OBSTETRICS AND GYNECOLOGY | Age: 35
End: 2020-06-10

## 2020-06-10 NOTE — TELEPHONE ENCOUNTER
----- Message from Fide Shelton MD sent at 6/10/2020  9:01 AM EDT -----  Call Ynes, her prenatal labs and all antiphospholipid antibodies are normal

## 2020-06-12 ENCOUNTER — PROCEDURE VISIT (OUTPATIENT)
Dept: OBSTETRICS AND GYNECOLOGY | Age: 35
End: 2020-06-12

## 2020-06-12 ENCOUNTER — INITIAL PRENATAL (OUTPATIENT)
Dept: OBSTETRICS AND GYNECOLOGY | Age: 35
End: 2020-06-12

## 2020-06-12 VITALS — SYSTOLIC BLOOD PRESSURE: 104 MMHG | BODY MASS INDEX: 38.93 KG/M2 | WEIGHT: 226.8 LBS | DIASTOLIC BLOOD PRESSURE: 64 MMHG

## 2020-06-12 DIAGNOSIS — O36.80X0 ENCOUNTER TO DETERMINE FETAL VIABILITY OF PREGNANCY, SINGLE OR UNSPECIFIED FETUS: Primary | ICD-10-CM

## 2020-06-12 DIAGNOSIS — Z3A.10 10 WEEKS GESTATION OF PREGNANCY: Primary | ICD-10-CM

## 2020-06-12 DIAGNOSIS — R63.8 INCREASED BMI: ICD-10-CM

## 2020-06-12 DIAGNOSIS — Z87.59 HISTORY OF PRIOR PREGNANCY WITH IUGR NEWBORN: ICD-10-CM

## 2020-06-12 DIAGNOSIS — O09.529 ANTEPARTUM MULTIGRAVIDA OF ADVANCED MATERNAL AGE: ICD-10-CM

## 2020-06-12 DIAGNOSIS — Z3A.09 9 WEEKS GESTATION OF PREGNANCY: ICD-10-CM

## 2020-06-12 LAB
CLARITY, POC: CLEAR
COLOR UR: YELLOW
EXTERNAL CYSTIC FIBROSIS: NORMAL
EXTERNAL NIPT: NORMAL
GLUCOSE UR STRIP-MCNC: NEGATIVE MG/DL
PROT UR STRIP-MCNC: NEGATIVE MG/DL

## 2020-06-12 PROCEDURE — 76817 TRANSVAGINAL US OBSTETRIC: CPT | Performed by: OBSTETRICS & GYNECOLOGY

## 2020-06-12 PROCEDURE — 0501F PRENATAL FLOW SHEET: CPT | Performed by: OBSTETRICS & GYNECOLOGY

## 2020-06-12 NOTE — PROGRESS NOTES
Pt presents for OB intake  ROS: complete ros negative  O: see physical form  A/p: 9 weeks: pt declines aneuploidy screening, plan f/u 4 weeks    Hx IUGR: recommend baby asa 12+ weeks; screen for APS was negative, plan growth u/s    AMA: pt declines aneuploidy screening, recommend baby asa starting at 12 weeks

## 2020-06-16 LAB
C TRACH RRNA CVX QL NAA+PROBE: NEGATIVE
CYTOLOGIST CVX/VAG CYTO: NORMAL
CYTOLOGY CVX/VAG DOC CYTO: NORMAL
CYTOLOGY CVX/VAG DOC THIN PREP: NORMAL
DX ICD CODE: NORMAL
HIV 1 & 2 AB SER-IMP: NORMAL
HPV I/H RISK 4 DNA CVX QL PROBE+SIG AMP: NEGATIVE
N GONORRHOEA RRNA CVX QL NAA+PROBE: NEGATIVE
OTHER STN SPEC: NORMAL
STAT OF ADQ CVX/VAG CYTO-IMP: NORMAL

## 2020-07-10 ENCOUNTER — ROUTINE PRENATAL (OUTPATIENT)
Dept: OBSTETRICS AND GYNECOLOGY | Age: 35
End: 2020-07-10

## 2020-07-10 VITALS — WEIGHT: 219.4 LBS | BODY MASS INDEX: 37.66 KG/M2 | DIASTOLIC BLOOD PRESSURE: 62 MMHG | SYSTOLIC BLOOD PRESSURE: 100 MMHG

## 2020-07-10 DIAGNOSIS — O09.529 ANTEPARTUM MULTIGRAVIDA OF ADVANCED MATERNAL AGE: ICD-10-CM

## 2020-07-10 DIAGNOSIS — Z3A.13 13 WEEKS GESTATION OF PREGNANCY: Primary | ICD-10-CM

## 2020-07-10 LAB
CLARITY, POC: CLEAR
COLOR UR: YELLOW
GLUCOSE UR STRIP-MCNC: NEGATIVE MG/DL
PROT UR STRIP-MCNC: ABNORMAL MG/DL

## 2020-07-10 PROCEDURE — 0502F SUBSEQUENT PRENATAL CARE: CPT | Performed by: OBSTETRICS & GYNECOLOGY

## 2020-07-10 RX ORDER — ASPIRIN 81 MG/1
81 TABLET ORAL DAILY
COMMUNITY
End: 2021-06-15

## 2020-07-10 NOTE — PROGRESS NOTES
Pt presents for routine appt. She denies any issues today.    AMA: pt declines aneuploidy screening, plan targeted u/s and ordered this    Hx IUGR: pt started baby asa, plan growth u/s    rtc 4 weeks with early one hour

## 2020-07-13 ENCOUNTER — TELEPHONE (OUTPATIENT)
Dept: OBSTETRICS AND GYNECOLOGY | Age: 35
End: 2020-07-13

## 2020-07-13 NOTE — TELEPHONE ENCOUNTER
"I also just got a call from [facility] about this patient's concern.  Although [facility] said that the patient stated the appt was \"2 weeks too early\".    The referral said 6-7 weeks.  Appt scheduled exactly 6 weeks out, on Fri 8/21 which puts her at 19.3 weeks.  We can r/s later if 8/21 is too soon.  "

## 2020-07-13 NOTE — TELEPHONE ENCOUNTER
(Cat pt) Pt was advised to go to the hospital in 7 weeks to have her 20 week scan and they are trying tt schedule her a week earlier, pt is wondering if this is okay. Please advise.     [phone]

## 2020-08-10 ENCOUNTER — ROUTINE PRENATAL (OUTPATIENT)
Dept: OBSTETRICS AND GYNECOLOGY | Age: 35
End: 2020-08-10

## 2020-08-10 VITALS — WEIGHT: 216.4 LBS | BODY MASS INDEX: 37.14 KG/M2 | SYSTOLIC BLOOD PRESSURE: 102 MMHG | DIASTOLIC BLOOD PRESSURE: 60 MMHG

## 2020-08-10 DIAGNOSIS — Z13.1 SCREENING FOR DIABETES MELLITUS: ICD-10-CM

## 2020-08-10 DIAGNOSIS — Z3A.17 17 WEEKS GESTATION OF PREGNANCY: Primary | ICD-10-CM

## 2020-08-10 DIAGNOSIS — O09.529 ANTEPARTUM MULTIGRAVIDA OF ADVANCED MATERNAL AGE: ICD-10-CM

## 2020-08-10 DIAGNOSIS — Z87.59 HISTORY OF PRIOR PREGNANCY WITH IUGR NEWBORN: ICD-10-CM

## 2020-08-10 DIAGNOSIS — Z13.0 SCREENING FOR IRON DEFICIENCY ANEMIA: ICD-10-CM

## 2020-08-10 LAB
2ND TRIMESTER 4 SCREEN SERPL-IMP: NORMAL
AFP INTERP SERPL-IMP: NORMAL
CLARITY, POC: CLEAR
COLOR UR: YELLOW
GLUCOSE UR STRIP-MCNC: NEGATIVE MG/DL
PROT UR STRIP-MCNC: ABNORMAL MG/DL

## 2020-08-10 PROCEDURE — 0502F SUBSEQUENT PRENATAL CARE: CPT | Performed by: OBSTETRICS & GYNECOLOGY

## 2020-08-10 NOTE — PROGRESS NOTES
Pt presents for routine visit. She has ongoing nausea but reports rare emesis. She denies any issues today    A/p: ama: pt declines aneuploidy screening or afp, plan targeted u/s and this is scheduled at [facility]    Hx IUGR last pregnancy: pt is taking baby asa, plan serial growth later in pregnancy    Increased BMI: pt is doing early glucose today    Wt loss noted: offered treatment for nausea and pt declines. She reports emesis is rare and she feels well overall    rtc 4 weeks

## 2020-08-11 ENCOUNTER — TELEPHONE (OUTPATIENT)
Dept: OBSTETRICS AND GYNECOLOGY | Age: 35
End: 2020-08-11

## 2020-08-11 DIAGNOSIS — O99.019 MATERNAL ANEMIA IN PREGNANCY, ANTEPARTUM: ICD-10-CM

## 2020-08-11 DIAGNOSIS — R73.09 ELEVATED GLUCOSE TOLERANCE TEST: Primary | ICD-10-CM

## 2020-08-11 LAB
BASOPHILS # BLD AUTO: 0.02 10*3/MM3 (ref 0–0.2)
BASOPHILS NFR BLD AUTO: 0.2 % (ref 0–1.5)
EOSINOPHIL # BLD AUTO: 0.03 10*3/MM3 (ref 0–0.4)
EOSINOPHIL NFR BLD AUTO: 0.3 % (ref 0.3–6.2)
ERYTHROCYTE [DISTWIDTH] IN BLOOD BY AUTOMATED COUNT: 12.8 % (ref 12.3–15.4)
GLUCOSE 1H P 50 G GLC PO SERPL-MCNC: 193 MG/DL (ref 65–139)
HCT VFR BLD AUTO: 35.5 % (ref 34–46.6)
HGB BLD-MCNC: 11.6 G/DL (ref 12–15.9)
IMM GRANULOCYTES # BLD AUTO: 0.04 10*3/MM3 (ref 0–0.05)
IMM GRANULOCYTES NFR BLD AUTO: 0.5 % (ref 0–0.5)
LYMPHOCYTES # BLD AUTO: 1.69 10*3/MM3 (ref 0.7–3.1)
LYMPHOCYTES NFR BLD AUTO: 19.7 % (ref 19.6–45.3)
MCH RBC QN AUTO: 29.7 PG (ref 26.6–33)
MCHC RBC AUTO-ENTMCNC: 32.7 G/DL (ref 31.5–35.7)
MCV RBC AUTO: 90.8 FL (ref 79–97)
MONOCYTES # BLD AUTO: 0.28 10*3/MM3 (ref 0.1–0.9)
MONOCYTES NFR BLD AUTO: 3.3 % (ref 5–12)
NEUTROPHILS # BLD AUTO: 6.54 10*3/MM3 (ref 1.7–7)
NEUTROPHILS NFR BLD AUTO: 76 % (ref 42.7–76)
NRBC BLD AUTO-RTO: 0 /100 WBC (ref 0–0.2)
PLATELET # BLD AUTO: 277 10*3/MM3 (ref 140–450)
RBC # BLD AUTO: 3.91 10*6/MM3 (ref 3.77–5.28)
WBC # BLD AUTO: 8.6 10*3/MM3 (ref 3.4–10.8)

## 2020-08-11 RX ORDER — FERROUS SULFATE 325(65) MG
325 TABLET ORAL
Qty: 30 TABLET | Refills: 12 | Status: SHIPPED | OUTPATIENT
Start: 2020-08-11 | End: 2021-06-15

## 2020-08-11 NOTE — TELEPHONE ENCOUNTER
Called pt regarding elevated one hour. Discussed options of 3 hour vs treating for GDM. Pt prefers to proceed with 3 hour and ordered. Also counseled regarding anemia and recommend she start iron supplement.

## 2020-08-15 LAB
FERRITIN SERPL-MCNC: 93.2 NG/ML (ref 13–150)
GLUCOSE 1H P 100 G GLC PO SERPL-MCNC: 255 MG/DL (ref 65–139)
GLUCOSE 2H P 100 G GLC PO SERPL-MCNC: 215 MG/DL (ref 65–154)
GLUCOSE 3H P 100 G GLC PO SERPL-MCNC: 92 MG/DL (ref 65–139)
GLUCOSE P FAST SERPL-MCNC: 110 MG/DL (ref 65–94)

## 2020-08-17 ENCOUNTER — TELEPHONE (OUTPATIENT)
Dept: OBSTETRICS AND GYNECOLOGY | Age: 35
End: 2020-08-17

## 2020-08-17 DIAGNOSIS — O24.410 DIET CONTROLLED GESTATIONAL DIABETES MELLITUS (GDM), ANTEPARTUM: Primary | ICD-10-CM

## 2020-08-17 RX ORDER — BLOOD-GLUCOSE METER
1 KIT MISCELLANEOUS AS NEEDED
Qty: 1 EACH | Refills: 0 | Status: SHIPPED | OUTPATIENT
Start: 2020-08-17 | End: 2021-06-15

## 2020-08-17 NOTE — TELEPHONE ENCOUNTER
Called pt and discussed her glucose results. Advised she has GDM. Placed order for consult with dietician and ordered glucometer, lancets and glucose strips. Advised pt to call if she is not contacted to book with diabetic educator within 2 days. Advised she contact me with glucose values to review within a week of her consult.

## 2020-08-20 ENCOUNTER — HOSPITAL ENCOUNTER (OUTPATIENT)
Dept: DIABETES SERVICES | Facility: HOSPITAL | Age: 35
Discharge: HOME | End: 2020-08-20
Admitting: OBSTETRICS & GYNECOLOGY

## 2020-08-20 PROCEDURE — G0108 DIAB MANAGE TRN  PER INDIV: HCPCS

## 2020-08-21 ENCOUNTER — OFFICE VISIT (OUTPATIENT)
Dept: OBSTETRICS AND GYNECOLOGY | Facility: CLINIC | Age: 35
End: 2020-08-21

## 2020-08-21 ENCOUNTER — HOSPITAL ENCOUNTER (OUTPATIENT)
Dept: ULTRASOUND IMAGING | Facility: HOSPITAL | Age: 35
Discharge: HOME | End: 2020-08-21
Admitting: OBSTETRICS & GYNECOLOGY

## 2020-08-21 ENCOUNTER — TRANSCRIBE ORDERS (OUTPATIENT)
Dept: ULTRASOUND IMAGING | Facility: HOSPITAL | Age: 35
End: 2020-08-21

## 2020-08-21 VITALS
TEMPERATURE: 98.2 F | HEIGHT: 64 IN | WEIGHT: 215.6 LBS | DIASTOLIC BLOOD PRESSURE: 68 MMHG | HEART RATE: 99 BPM | SYSTOLIC BLOOD PRESSURE: 110 MMHG | BODY MASS INDEX: 36.81 KG/M2

## 2020-08-21 DIAGNOSIS — O09.529 ANTEPARTUM MULTIGRAVIDA OF ADVANCED MATERNAL AGE: Primary | ICD-10-CM

## 2020-08-21 DIAGNOSIS — O09.529 ANTEPARTUM MULTIGRAVIDA OF ADVANCED MATERNAL AGE: ICD-10-CM

## 2020-08-21 DIAGNOSIS — O24.410 DIET CONTROLLED GESTATIONAL DIABETES MELLITUS (GDM), ANTEPARTUM: Primary | ICD-10-CM

## 2020-08-21 PROCEDURE — 99243 OFF/OP CNSLTJ NEW/EST LOW 30: CPT | Performed by: OBSTETRICS & GYNECOLOGY

## 2020-08-21 PROCEDURE — 76811 OB US DETAILED SNGL FETUS: CPT

## 2020-08-21 PROCEDURE — 76811 OB US DETAILED SNGL FETUS: CPT | Performed by: OBSTETRICS & GYNECOLOGY

## 2020-08-21 NOTE — PROGRESS NOTES
Patient seen in Maternal Fetal Medicine clinic today. Please see full note in [facility].   Mariola Appiah MD

## 2020-08-27 ENCOUNTER — TELEPHONE (OUTPATIENT)
Dept: OBSTETRICS AND GYNECOLOGY | Age: 35
End: 2020-08-27

## 2020-08-27 NOTE — TELEPHONE ENCOUNTER
(Cat Pt)     Pt was informed to call with glucose results for the week.      Aug 20th    fasting- 100   2 hours after breakfast- 116   2 hours after lunch 98   2 hours after dinner 106  Aug 21   fasting- 104   2 hours after breakfast- 102   2 hours after lunch 100   2 hours after dinner 113  Aug 22   fasting- 96   2 hours after breakfast-96   2 hours after lunch 95   2 hours after dinner 121  Aug 23   fasting- 112   2 hours after breakfast-110   2 hours after lunch 127   2 hours after dinner 94  Aug 24   fasting- 96   2 hours after breakfast-93   2 hours after lunch 94   2 hours after dinner 135  Aug 25   fasting- 95   2 hours after breakfast-88   2 hours after lunch 102   2 hours after dinner 118  Aug 26   fasting- 106   2 hours after breakfast-100   2 hours after lunch 112   2 hours after dinner 130    Please advise    [phone]

## 2020-08-28 ENCOUNTER — TELEPHONE (OUTPATIENT)
Dept: OBSTETRICS AND GYNECOLOGY | Age: 35
End: 2020-08-28

## 2020-08-28 ENCOUNTER — TELEPHONE (OUTPATIENT)
Dept: DIABETES SERVICES | Facility: HOSPITAL | Age: 35
End: 2020-08-28

## 2020-08-28 DIAGNOSIS — O24.414 INSULIN CONTROLLED GESTATIONAL DIABETES MELLITUS (GDM) DURING PREGNANCY, ANTEPARTUM: Primary | ICD-10-CM

## 2020-08-28 RX ORDER — NAPROXEN SODIUM 220 MG
TABLET ORAL
Qty: 100 EACH | Refills: 5 | Status: SHIPPED | OUTPATIENT
Start: 2020-08-28 | End: 2021-06-15

## 2020-08-28 NOTE — TELEPHONE ENCOUNTER
LVM FOR PT RE: SCHEDULING.  S/W LAMINE AT DIABETIC OFFICE, SHE HAS ORDERS AND WILL CALL PATIENT TO SCHEDULE.

## 2020-08-28 NOTE — TELEPHONE ENCOUNTER
Called pt and discussed her values. Her fasting values are remaining elevated. The pp values are overall good. Recommend she start NPH insulin at night. Will start with 5 units. Sent referral for education/diabetic management. Advised pt not to start treatment until she has had education and met with diabetic nurse. She notes understanding.

## 2020-08-28 NOTE — TELEPHONE ENCOUNTER
Freya called re pt referral for insulin start. Call pt to arrange insulin ed. Pt reports she cannot come today d/t no . Pt is agreeable to come in Monday for instructions. Instruct pt to bring insulin/supplies dispensed from pharmacy to appt. Pt agreeable and will call us Monday a.m to confirm time she is available for teaching.

## 2020-08-28 NOTE — TELEPHONE ENCOUNTER
Johana velasquez/ [facility] called,  Dr. Shelton called in glucose (B-D GLUCOSE) 5 g chewable tables for the pt. Johana stated the pharmacy only has the 4g.   Is that ok to change from 5g to 4g?    [facility] ([place]) - [phone]

## 2020-08-31 ENCOUNTER — HOSPITAL ENCOUNTER (OUTPATIENT)
Dept: DIABETES SERVICES | Facility: HOSPITAL | Age: 35
Discharge: HOME | End: 2020-08-31
Admitting: OBSTETRICS & GYNECOLOGY

## 2020-08-31 ENCOUNTER — TELEPHONE (OUTPATIENT)
Dept: DIABETES SERVICES | Facility: HOSPITAL | Age: 35
End: 2020-08-31

## 2020-08-31 PROCEDURE — G0108 DIAB MANAGE TRN  PER INDIV: HCPCS

## 2020-08-31 NOTE — TELEPHONE ENCOUNTER
Rec telephone msg from pt that her pharmacy may not have insulin rx until evening. Call pt to ask if we can use our pharmacy in-house to get insulin sooner and pt verbalizes being amenable. Pt states she needs ed appt before 2p as she needs to be somewhere at 3. Tod with pt to meet for insulin instruction at 1330 today.

## 2020-09-08 ENCOUNTER — ROUTINE PRENATAL (OUTPATIENT)
Dept: OBSTETRICS AND GYNECOLOGY | Age: 35
End: 2020-09-08

## 2020-09-08 VITALS — BODY MASS INDEX: 36.22 KG/M2 | SYSTOLIC BLOOD PRESSURE: 102 MMHG | WEIGHT: 211 LBS | DIASTOLIC BLOOD PRESSURE: 62 MMHG

## 2020-09-08 DIAGNOSIS — Z3A.22 22 WEEKS GESTATION OF PREGNANCY: Primary | ICD-10-CM

## 2020-09-08 DIAGNOSIS — O24.410 DIET CONTROLLED GESTATIONAL DIABETES MELLITUS (GDM), ANTEPARTUM: ICD-10-CM

## 2020-09-08 DIAGNOSIS — O09.529 ANTEPARTUM MULTIGRAVIDA OF ADVANCED MATERNAL AGE: ICD-10-CM

## 2020-09-08 LAB
CLARITY, POC: CLEAR
COLOR UR: YELLOW
GLUCOSE UR STRIP-MCNC: NEGATIVE MG/DL
PROT UR STRIP-MCNC: NEGATIVE MG/DL

## 2020-09-08 PROCEDURE — 0502F SUBSEQUENT PRENATAL CARE: CPT | Performed by: OBSTETRICS & GYNECOLOGY

## 2020-09-08 NOTE — PROGRESS NOTES
Pt presents for routine visit. She started her NPH insulin at bedtime. She denies any issues today. Notes positive fetal movement. She denies vag bleeding/ctx/leaking fluid  O: glucose log: fasting last 9 days 90 to 106; only 2 values under 95  PP values: rare elevation related to diet, most values less than 120  A/p: GDM: fasting values still elevated with NPH insulin, will increase to 8 units Q pm and follow, pt will call in 1 week to review glucose values    Hx IUGR last preg: pt in on baby asa, plan growth surveillance with u/s. Pt is scheduled with SHIVANI in 10 days. Will repeat as indicated and plan  testing 32+ weeks    [place]: pt had targeted u/s with MFM and f/u scheduled.    rtc 2 weeks, reviewed PTL warnings. Recommend flu shot, pt declines.

## 2020-09-11 ENCOUNTER — TELEPHONE (OUTPATIENT)
Dept: DIABETES SERVICES | Facility: HOSPITAL | Age: 35
End: 2020-09-11

## 2020-09-11 DIAGNOSIS — O24.414 INSULIN CONTROLLED GESTATIONAL DIABETES MELLITUS (GDM) DURING PREGNANCY, ANTEPARTUM: ICD-10-CM

## 2020-09-11 NOTE — TELEPHONE ENCOUNTER
Called pt to f/u on fasting BGs since titration to 8 units at hs. OB is satisfied with pt post-prandials per her note. Pt reports fasting bgs 100, 99, 103 last 3 days. After speaking to Dr Shelton about elevated fastings x3 and rec telephone order, call pt bk and instruct her to go up to 10 units bedtime hs insulin. Pt v.u. Will cont to f/u with pt on outpt basis.

## 2020-09-15 ENCOUNTER — TELEPHONE (OUTPATIENT)
Dept: OBSTETRICS AND GYNECOLOGY | Age: 35
End: 2020-09-15

## 2020-09-15 NOTE — TELEPHONE ENCOUNTER
(Cat pt) Pt called, glucose log from 9/8/2020-9/15/2020    Tuesday 9/8  Fasting  - 97  Breakfast - 111  Lunch- 111  Dinner- 116      Wednesday 9/9  Fasting - 100  Breakfast - 109  Lunch- 100  Dinner- 104      Thursday 9/10  Fasting- 99  Breakfast - 93  Lunch- 99  Dinner- 118      Friday 9/11  Fasting- 103  Breakfast - 97  Lunch- 109  Dinner- 104      Saturday 9/12  Fasting - 85  Breakfast - 94  Lunch- 121  Dinner- 102      Sunday 9/13  Fasting - 92  Breakfast - 99  Lunch- 112  Dinner- 103      Monday 9/14  Fasting- 107  Breakfast - 100  Lunch- 121  Dinner- 105      Tuesday 9/15  Fasting- 96  Breakfast - 99  Lunch- 120  Dinner- No recorded yet   Pt had metal band removed from finger. Pt finger cleaned, blood cleaned off, neosporin placed and a bandaid.  Instructions given to mom about cleaning

## 2020-09-16 NOTE — TELEPHONE ENCOUNTER
Called pt. She is taking 10 units of NPH at night. Fasting today was 99. Advised her to increase to 12 units of NPH at bedtime. She will monitor for several days. If glucose still over 95, advised her to increase further to 14 units. Advised her to check level at 2 to 3 am as well to confirm no hypoglycemia. Advised her to call with hypoglycemia.

## 2020-09-18 ENCOUNTER — HOSPITAL ENCOUNTER (OUTPATIENT)
Dept: ULTRASOUND IMAGING | Facility: HOSPITAL | Age: 35
Discharge: HOME | End: 2020-09-18
Admitting: OBSTETRICS & GYNECOLOGY

## 2020-09-18 ENCOUNTER — OFFICE VISIT (OUTPATIENT)
Dept: OBSTETRICS AND GYNECOLOGY | Facility: CLINIC | Age: 35
End: 2020-09-18

## 2020-09-18 ENCOUNTER — TRANSCRIBE ORDERS (OUTPATIENT)
Dept: ULTRASOUND IMAGING | Facility: HOSPITAL | Age: 35
End: 2020-09-18

## 2020-09-18 VITALS
HEART RATE: 97 BPM | HEIGHT: 64 IN | TEMPERATURE: 98 F | WEIGHT: 209.5 LBS | SYSTOLIC BLOOD PRESSURE: 98 MMHG | BODY MASS INDEX: 35.77 KG/M2 | DIASTOLIC BLOOD PRESSURE: 60 MMHG

## 2020-09-18 DIAGNOSIS — O24.410 DIET CONTROLLED GESTATIONAL DIABETES MELLITUS (GDM), ANTEPARTUM: Primary | ICD-10-CM

## 2020-09-18 DIAGNOSIS — O24.414 INSULIN CONTROLLED GESTATIONAL DIABETES MELLITUS (GDM) IN SECOND TRIMESTER: Primary | ICD-10-CM

## 2020-09-18 DIAGNOSIS — O09.529 ANTEPARTUM MULTIGRAVIDA OF ADVANCED MATERNAL AGE: ICD-10-CM

## 2020-09-18 DIAGNOSIS — E66.9 OBESITY (BMI 30-39.9): ICD-10-CM

## 2020-09-18 PROCEDURE — 99214 OFFICE O/P EST MOD 30 MIN: CPT | Performed by: OBSTETRICS & GYNECOLOGY

## 2020-09-18 PROCEDURE — 76816 OB US FOLLOW-UP PER FETUS: CPT | Performed by: OBSTETRICS & GYNECOLOGY

## 2020-09-18 PROCEDURE — 76816 OB US FOLLOW-UP PER FETUS: CPT

## 2020-09-18 NOTE — PATIENT INSTRUCTIONS
"- INCREASE Insulin to NPH 18Units at night   - check 2AM glucose values for 2 - 3 nights to help assess for \"nighttime hypoglycemia with rebound fasting hyperglycemia\" that can occur when increasing PM dosing of insulin  "

## 2020-09-18 NOTE — PROGRESS NOTES
"MATERNAL FETAL MEDICINE OUTPATIENT NOTE     Dear Dr Shelton    Thank you for your patient referral. Ynes Flores is a 34 y.o.   at 23 3/7 weeks gestation (Estimated Date of Delivery: 21).   This is a  followup visit.       Chief complaint    ICD-10-CM ICD-9-CM   1. Insulin controlled gestational diabetes mellitus (GDM) in second trimester  O24.414 648.83   2. Obesity (BMI 30-39.9)  E66.9 278.00         Past obstetric, gynecological, medical, surgical, family and social history reviewed; no changes made.     Review of systems  Constitutional:  No Weight Change, No Fever, No Chills, No Fatigue, No Malaise  ENT/Mouth:  No Hearing Changes, No Sinus Pain, No Hoarseness, No sore throat, No   Eyes:  No Eye Pain, No Vision Changes  Cardiovascular:  No Chest Pain, No SOB, No Palpitations  Respiratory:  No Cough, No Wheezing, No Dyspnea  Gastrointestinal:  No Nausea, No Vomiting, No Diarrhea, No Constipation, No Pain   Genitourinary:   No Dysuria, No Urinary Frequency, No Hematuria, No Flank Pain  Musculoskeletal:  No Arthralgias, No Myalgias,   Skin:  No Skin Lesions, No Pruritis,   Neuro:  No Weakness, No Numbness, No Loss of Consciousness, No Syncope  Psych:  No Memory Changes, No Violence/Abuse Hx., No Eating Concerns  Heme/Lymph:  No Bruising, No Bleeding  Endocrine:   No Temperature Intolerance    Vitals:    20 0825   BP: 98/60   Pulse: 97   Temp: 98 °F (36.7 °C)   Weight: 95 kg (209 lb 8 oz)   Height: 162.6 cm (64\")         PHYSICAL EXAM   GENERAL: Not in acute distress, gravid   ABDOMINAL: No fundal tenderness, no rebound or guarding   EXTREMITIES: Bilaterally No edema / tenderness  PELVIC: No obvious vaginal discharge / bleeding    ULTRASOUND   Please view full ultrasound note on Imaging tab in ViewPoint.      ASSESSMENT   34 y.o.   at 23 3/7 weeks gestation (Estimated Date of Delivery: 21), reassuring fetal and maternal status.     1. Single live intrauterine pregnancy   [ X ] stable  [ "   ] improving [  ] worsening    2. Gestational Diabetes A2     Glucose log  Fasting > 90% ABNORMAL    Postprandial values > 50% normal    -Currently on -  Insulin to NPH 12Units at night     -Extensive diabetes counseling today -  morbidity associated with diabetes in pregnancy reviewed, including but not limited to: spontaneous , errors in organogenesis including increased risk of heart and skeletal defects, fetal macrosomia,  hypoglycemia, shoulder dystocia, increased risk of preeclampsia.   - Reviewed goals of fasting 60 - 95 and 2 hour postprandial< 120 throughout pregnancy.  - Counseled regarding risk of hypoglycemia (maternal and fetal risks)   - Recommend emailing/fax/submitting her log weekly for review to her primary OB office.  - Recommend contacting her primary OB office if >50% of any values are abnormal or any are over 200.     3. Obesity   Obesity increases the risk of hypertensive disorders, diabetes and operative delivery. We discussed recommended weight gain of 11-20 lb during pregnancy and discussed the importance of diet modifications and exercise. Technical limitations (eg, maternal obesity, prone fetal position, and late gestation) can make a detailed heart evaluation very difficult due to acoustic shadowing. Therefore, fetal anatomy is suboptimal and will likely remain suboptimal for the remainder of the pregnancy    4. Genetics   Declined AFP and aneuploidy screening         PLAN  -INCREASE Insulin to NPH 18Units at night   -Scheduled televisit to review glucose values in 2 weeks   -Serial growth ultrasounds every 3 - 4 weeks   -Starting at 28 - 32 weeks: Weekly fetal  surveillance until delivery   -Starting at 28 weeks: Fetal movement instructions given continue daily until delivery; instructed to report to labor and delivery if cannot achieve more than 10 kicks in one hour or if she perceives a decrease in fetal movement  - If estimated fetal weight > 4500gm  "at time of delivery, would recommend  delivery counseling   -Intrapartum glucose monitoring is recommended, with a goal of .   - check 2AM glucose values for 2 - 3 nights to help assess for \"nighttime hypoglycemia with rebound fasting hyperglycemia\" that can occur when increasing PM dosing of insulin    This note has been routed to the referring obstetricians/medical provider.   Thank you for your clinical consult.     BREONNA HALL MD MPH FACOG  MATERNAL FETAL MEDICINE       "

## 2020-09-21 ENCOUNTER — ROUTINE PRENATAL (OUTPATIENT)
Dept: OBSTETRICS AND GYNECOLOGY | Age: 35
End: 2020-09-21

## 2020-09-21 VITALS — WEIGHT: 207.6 LBS | BODY MASS INDEX: 35.63 KG/M2 | DIASTOLIC BLOOD PRESSURE: 64 MMHG | SYSTOLIC BLOOD PRESSURE: 110 MMHG

## 2020-09-21 DIAGNOSIS — Z3A.23 23 WEEKS GESTATION OF PREGNANCY: Primary | ICD-10-CM

## 2020-09-21 PROCEDURE — 0502F SUBSEQUENT PRENATAL CARE: CPT | Performed by: OBSTETRICS & GYNECOLOGY

## 2020-09-21 NOTE — PROGRESS NOTES
Pt presents for OB f/u. She reports active fetal movement. She denies ctx, bleeding or leaking fluid. She saw MFM last week. She increased her NPH insulin at night to 18 units.     Glucose: fasting 85 to 103; pp values 94 to 121    A/p: GDM: fasting values remain an issue but her other values are normal. Discussed PM snack which she is not consistently taking. She will make an effort to eat a small snack in pm the next 2 nights and see if this helps the values. She will call office to review in 2 days. Plan  testing, MFM advised to start 28 to 32 weeks    Hx IUGR: pt had normal growth with MFM last week. They scheduled her for f/u in 4 weeks. She will continue baby asa through 36 weeks.    She declines flu shot; reviewed PTL warnings     AMA: she declined aneuploidy testing. Had normal targeted u/s

## 2020-09-24 ENCOUNTER — TELEPHONE (OUTPATIENT)
Dept: DIABETES SERVICES | Facility: HOSPITAL | Age: 35
End: 2020-09-24

## 2020-09-25 ENCOUNTER — TELEPHONE (OUTPATIENT)
Dept: DIABETES SERVICES | Facility: HOSPITAL | Age: 35
End: 2020-09-25

## 2020-09-25 ENCOUNTER — TELEPHONE (OUTPATIENT)
Dept: OBSTETRICS AND GYNECOLOGY | Age: 35
End: 2020-09-25

## 2020-09-25 DIAGNOSIS — O24.414 INSULIN CONTROLLED GESTATIONAL DIABETES MELLITUS (GDM) DURING PREGNANCY, ANTEPARTUM: ICD-10-CM

## 2020-09-25 RX ORDER — INSULIN HUMAN 100 [IU]/ML
22 INJECTION, SUSPENSION SUBCUTANEOUS NIGHTLY
Qty: 10 ML | Refills: 16 | Status: SHIPPED | OUTPATIENT
Start: 2020-09-25 | End: 2020-12-28 | Stop reason: HOSPADM

## 2020-09-25 NOTE — TELEPHONE ENCOUNTER
Pt called back. Missed call. Call pt back and elicit last 7days FBGs. Per Dr Shelton' note she is satisfied w/pt's pp BGs for the most part. Pts FBGs since last insulin change are  (9/19/2020 thru today.) I can titrate her up 10% per our protocol. Will touch base w/OB if she would like to increase dose more than 2 units. Pt v.u.

## 2020-09-25 NOTE — TELEPHONE ENCOUNTER
Call pt back after speaking to Dr Shelton on phone re elevated FBGs . Receive telephone order to have pt increase hs NPH from 18 to 22 units nightly. Instruct pt and she read back new dosage correctly. Pt denies any low BG/sxs <70. Additionally instruct pt re changing out insulin vials q30d. Will cont to f/u with pt on outpt basis.

## 2020-09-25 NOTE — TELEPHONE ENCOUNTER
Reviewed Ynes's glucose results with diabetic counselor. Will increase her PM NPH insulin to 22 units from 18. She will contact Ynes to discuss increased dose and advise her to monitor early am values around 2:30. She will advise pt to call with hypoglycemia or persistent high am fasting values.

## 2020-09-25 NOTE — TELEPHONE ENCOUNTER
(Cat pt) Joanna velasquez/Diabetes Management @ [facility] would like to talk to you concerning Ynes.  Her fasting is  and she was wondering if they could bump her up a few units?    Diabetes Management [phone]        Joanna also left her cell phone for you to call her back.  [phone]

## 2020-10-02 ENCOUNTER — OFFICE VISIT (OUTPATIENT)
Dept: OBSTETRICS AND GYNECOLOGY | Facility: CLINIC | Age: 35
End: 2020-10-02

## 2020-10-02 DIAGNOSIS — O24.414 INSULIN CONTROLLED GESTATIONAL DIABETES MELLITUS (GDM) IN SECOND TRIMESTER: Primary | ICD-10-CM

## 2020-10-02 PROCEDURE — 99443 PR PHYS/QHP TELEPHONE EVALUATION 21-30 MIN: CPT | Performed by: OBSTETRICS & GYNECOLOGY

## 2020-10-02 NOTE — PROGRESS NOTES
"MATERNAL FETAL MEDICINE - TELEPHONE NOTE     DIABETES COUNSELING     Dear Dr Shelton      Thank your for requesting a telephone diabetes management consultation for your patient.   Ynes Flores is a 34 y.o.   at  25 3/7 weeks gestation (Estimated Date of Delivery: 21) with the following diagnosis see below      Patient consent for phone visit.   The patient was asked \"You have chosen to receive care through a telephone visit. Do you consent to use a telephone visit for your medical care today?\" The patient replied yes.        ICD-10-CM ICD-9-CM   1. Insulin controlled gestational diabetes mellitus (GDM) in second trimester  O24.414 648.83       Denies headache, blurry vision, RUQ pain. Reports active fetal movement. No vaginal bleeding, no contractions.     She is on medications. Self-administers at home. No issues with administration.   She eats three meals per day with snacks.   She checks her blood sugars in the morning (fasting) and 2 hours after breakfast, lunch, and dinner    Review of systems  Constitutional:  No Weight Change, No Fever, No Chills, No Fatigue, No Malaise  ENT/Mouth:  No Hearing Changes, No Sinus Pain, No Hoarseness, No sore throat, No   Eyes:  No Eye Pain, No Vision Changes  Cardiovascular:  No Chest Pain, No SOB, No Palpitations  Respiratory:  No Cough, No Wheezing, No Dyspnea  Gastrointestinal:  No Nausea, No Vomiting, No Diarrhea, No Constipation, No Pain   Genitourinary:   No Dysuria, No Urinary Frequency, No Hematuria, No Flank Pain  Musculoskeletal:  No Arthralgias, No Myalgias,   Skin:  No Skin Lesions, No Pruritis,   Neuro:  No Weakness, No Numbness, No Loss of Consciousness, No Syncope  Psych:  No Memory Changes, No Violence/Abuse Hx., No Eating Concerns  Heme/Lymph:  No Bruising, No Bleeding  Endocrine:   No Temperature Intolerance      Current Outpatient Medications on File Prior to Visit   Medication Sig Dispense Refill   • Ascorbic Acid (VITAMIN C) 250 MG chewable " "tablet Chew.     • aspirin 81 MG EC tablet Take 81 mg by mouth Daily.     • ferrous sulfate 325 (65 FE) MG tablet Take 1 tablet by mouth Daily With Breakfast. 30 tablet 12   • glucose (B-D GLUCOSE) 5 g chewable tablet Use as directed for low blood sugar. 30 tablet 3   • glucose blood test strip Use as instructed to test blood glucose 4 times daily 120 each 12   • glucose blood test strip Use as instructed to test blood sugar 5 times daily 150 each 6   • glucose monitor monitoring kit 1 each As Needed (use to test glucose 4 times daily). 1 each 0   • guaiFENesin (MUCINEX) 600 MG 12 hr tablet Take 1,200 mg by mouth 2 (Two) Times a Day.     • ibuprofen (ADVIL,MOTRIN) 800 MG tablet Take 1 tablet by mouth Every 8 (Eight) Hours As Needed for Mild Pain . 30 tablet 0   • insulin NPH (HumuLIN N) 100 UNIT/ML injection Inject 5 Units under the skin into the appropriate area as directed Every Night. 10 mL 16   • insulin NPH (HumuLIN N) 100 UNIT/ML injection Inject 22 Units under the skin into the appropriate area as directed Every Night. 10 mL 16   • Insulin Syringe 31G X \" 0.5 ML misc Use as directed 100 each 5   • Prenat w/o A-FeCbGl-DSS-FA-DHA (CITRANATAL ASSURE) 35-1 & 300 MG tablet Take 1 tablet by mouth Daily. And 1 tablet by mouth daily 60 tablet 12   • Prenatal Vit-Fe Fumarate-FA (PRENATAL VITAMIN) 27-0.8 MG tablet Take 1 tablet by mouth Daily. 30 tablet 12   • PROAIR  (90 Base) MCG/ACT inhaler        No current facility-administered medications on file prior to visit.        CURRENT INSULIN REGIMEN   Morning AM - no medication  Evening PM - NPH 22 units     Her log was reviewed today (patient sent in log before hand)     Glucose log  Fasting > 50% ABNORMAL   Breakfast Postprandial values > 50% normal  Lunch Postprandial values > 50% normal  Dinner Postprandial values > 50% normal        ASSESSMENT     Ynes Flores is a 34 y.o.   at  25 3/7weeks gestation (Estimated Date of Delivery: 21) with " "Diabetes in pregnancy. Needs medication adjustment.       ICD-10-CM ICD-9-CM   1. Insulin controlled gestational diabetes mellitus (GDM) in second trimester  O24.414 648.83       [ X ] stable  [   ] improving [  ] worsening  [  ]  well-controlled  [  ] good-control  [ X  ] fair-control [  ] poorly-controlled  [  ] non-compliant    PLAN     COUNSELING   -Extensive diabetes counseling today -  morbidity associated with diabetes in pregnancy reviewed, including but not limited to: spontaneous , errors in organogenesis including increased risk of heart and skeletal defects, fetal macrosomia,  hypoglycemia, shoulder dystocia, increased risk of preeclampsia.   -Reviewed goals of fasting 60 - 95 and 2 hour postprandial< 120 throughout pregnancy.  -Counseled regarding risk of hypoglycemia (maternal and fetal risks)   -Recommend emailing/fax/submitting her log weekly for review to her primary OB office; to bring log to all MFM office visits.  -Recommend contacting her primary OB office if >50% of any values are abnormal or any are over 200.       MEDICATION CHANGES   Morning AM - no medication no changes.     Evening PM - increase NPH from 22 to 28 units at night       RECOMMENDATIONS  -Recommend emailing/fax/submitting her log weekly for review to her primary OB office; to bring log to all MFM office visits.  -Recommend contacting her primary OB office if >50% of any values are abnormal or any are over 200.   -Starting at 28  weeks: Weekly fetal  surveillance until delivery   -Starting at 28 weeks: Fetal movement instructions given continue daily until delivery; instructed to report to labor and delivery if cannot achieve more than 10 kicks in one hour or if she perceives a decrease in fetal movement    - For any nighttime medication changes: Recommend check 2AM glucose values for 2 - 3 nights to help assess for \"nighttime hypoglycemia with rebound fasting hyperglycemia\" that can occur when " increasing PM dosing of insulin    - If not previously done, primary OB should order - EKG then maternal echocardiogram if EKG abnormal, TSH with free T4, baseline creatinine, 24 hour urine protein   - Recommend patient to see ophthalmology and podiarty if not seen in last year       I have discussed over the phone with patient, the management above,  for > 30 minutes. All patient questions were answered, and patient concerns addressed.  This note has been routed to the referring obstetricians/medical provider. Thank you for requesting this clinical consult.         BREONNA HALL MD MPH FACOG  MATERNAL FETAL MEDICINE

## 2020-10-05 ENCOUNTER — ROUTINE PRENATAL (OUTPATIENT)
Dept: OBSTETRICS AND GYNECOLOGY | Age: 35
End: 2020-10-05

## 2020-10-05 VITALS — DIASTOLIC BLOOD PRESSURE: 62 MMHG | WEIGHT: 209.6 LBS | SYSTOLIC BLOOD PRESSURE: 102 MMHG | BODY MASS INDEX: 35.98 KG/M2

## 2020-10-05 DIAGNOSIS — Z3A.25 25 WEEKS GESTATION OF PREGNANCY: Primary | ICD-10-CM

## 2020-10-05 DIAGNOSIS — O24.414 INSULIN CONTROLLED GESTATIONAL DIABETES MELLITUS (GDM) IN SECOND TRIMESTER: ICD-10-CM

## 2020-10-05 DIAGNOSIS — Z13.0 SCREENING FOR IRON DEFICIENCY ANEMIA: ICD-10-CM

## 2020-10-05 PROCEDURE — 0502F SUBSEQUENT PRENATAL CARE: CPT | Performed by: OBSTETRICS & GYNECOLOGY

## 2020-10-05 NOTE — PROGRESS NOTES
Pt in for routine visit. She denies vag bleeding/ctx, leaking fluid. She notes active fetal movement.  Glucose log reviewed: fasting values 88 to 114. Pt notes values increased today and slightly up yesterday despite increasing PM NPH to 28 units at advice of MFM. She also notes she started new bottle of insulin    A/p: GDM: fasting values initially improving but highest today despite new dose. Also took early am value and not hypoglycemic. Will try another bottle of insulin since significant elevations followed her opening new supply. She will check early am glucose and call me tomorrow to review. Rest of glucose values are very good.     Hx IUGR: pt on baby asa, will have f/u growth with MFM on 10/16.     rtc 1 week

## 2020-10-06 ENCOUNTER — TELEPHONE (OUTPATIENT)
Dept: OBSTETRICS AND GYNECOLOGY | Age: 35
End: 2020-10-06

## 2020-10-06 LAB
ALBUMIN SERPL-MCNC: 3.9 G/DL (ref 3.5–5.2)
ALBUMIN/GLOB SERPL: 2 G/DL
ALP SERPL-CCNC: 81 U/L (ref 39–117)
ALT SERPL-CCNC: 20 U/L (ref 1–33)
AST SERPL-CCNC: 22 U/L (ref 1–32)
BASOPHILS # BLD AUTO: 0.03 10*3/MM3 (ref 0–0.2)
BASOPHILS NFR BLD AUTO: 0.3 % (ref 0–1.5)
BILIRUB SERPL-MCNC: <0.2 MG/DL (ref 0–1.2)
BUN SERPL-MCNC: 10 MG/DL (ref 6–20)
BUN/CREAT SERPL: 27.8 (ref 7–25)
CALCIUM SERPL-MCNC: 9.2 MG/DL (ref 8.6–10.5)
CHLORIDE SERPL-SCNC: 105 MMOL/L (ref 98–107)
CO2 SERPL-SCNC: 24.2 MMOL/L (ref 22–29)
CREAT SERPL-MCNC: 0.36 MG/DL (ref 0.57–1)
EOSINOPHIL # BLD AUTO: 0.05 10*3/MM3 (ref 0–0.4)
EOSINOPHIL NFR BLD AUTO: 0.5 % (ref 0.3–6.2)
ERYTHROCYTE [DISTWIDTH] IN BLOOD BY AUTOMATED COUNT: 13 % (ref 12.3–15.4)
FERRITIN SERPL-MCNC: 37 NG/ML (ref 13–150)
GLOBULIN SER CALC-MCNC: 2 GM/DL
GLUCOSE SERPL-MCNC: 94 MG/DL (ref 65–99)
HBA1C MFR BLD: 4.7 % (ref 4.8–5.6)
HCT VFR BLD AUTO: 36.1 % (ref 34–46.6)
HGB BLD-MCNC: 12 G/DL (ref 12–15.9)
IMM GRANULOCYTES # BLD AUTO: 0.11 10*3/MM3 (ref 0–0.05)
IMM GRANULOCYTES NFR BLD AUTO: 1 % (ref 0–0.5)
LYMPHOCYTES # BLD AUTO: 2.23 10*3/MM3 (ref 0.7–3.1)
LYMPHOCYTES NFR BLD AUTO: 20.1 % (ref 19.6–45.3)
MCH RBC QN AUTO: 30.4 PG (ref 26.6–33)
MCHC RBC AUTO-ENTMCNC: 33.2 G/DL (ref 31.5–35.7)
MCV RBC AUTO: 91.4 FL (ref 79–97)
MONOCYTES # BLD AUTO: 0.53 10*3/MM3 (ref 0.1–0.9)
MONOCYTES NFR BLD AUTO: 4.8 % (ref 5–12)
NEUTROPHILS # BLD AUTO: 8.16 10*3/MM3 (ref 1.7–7)
NEUTROPHILS NFR BLD AUTO: 73.3 % (ref 42.7–76)
NRBC BLD AUTO-RTO: 0 /100 WBC (ref 0–0.2)
PLATELET # BLD AUTO: 276 10*3/MM3 (ref 140–450)
POTASSIUM SERPL-SCNC: 4.7 MMOL/L (ref 3.5–5.2)
PROT SERPL-MCNC: 5.9 G/DL (ref 6–8.5)
RBC # BLD AUTO: 3.95 10*6/MM3 (ref 3.77–5.28)
SODIUM SERPL-SCNC: 139 MMOL/L (ref 136–145)
WBC # BLD AUTO: 11.11 10*3/MM3 (ref 3.4–10.8)

## 2020-10-06 NOTE — TELEPHONE ENCOUNTER
Pt called, stated that she was advised to report her glucose numbers    2:00AM - 3:00AM Numbers: 99    Fasting Number: 83

## 2020-10-06 NOTE — TELEPHONE ENCOUNTER
Called pt and discussed numbers. Her values are in desired range using new bottle of insulin. She will discard prior bottle or attempt to trade in at pharmacy. Advised her to call back if her values return to elevated again with new bottle of insulin. Pt agrees with plan.

## 2020-10-06 NOTE — TELEPHONE ENCOUNTER
----- Message from Fide Shelton MD sent at 10/6/2020  7:40 AM EDT -----  Call Ynes, her hgb is normal. Her hgbA1c is low so reassuring. Her random glucose was normal.

## 2020-10-12 ENCOUNTER — ROUTINE PRENATAL (OUTPATIENT)
Dept: OBSTETRICS AND GYNECOLOGY | Age: 35
End: 2020-10-12

## 2020-10-12 VITALS — BODY MASS INDEX: 35.74 KG/M2 | SYSTOLIC BLOOD PRESSURE: 108 MMHG | DIASTOLIC BLOOD PRESSURE: 60 MMHG | WEIGHT: 208.2 LBS

## 2020-10-12 DIAGNOSIS — Z3A.26 26 WEEKS GESTATION OF PREGNANCY: Primary | ICD-10-CM

## 2020-10-12 PROCEDURE — 0502F SUBSEQUENT PRENATAL CARE: CPT | Performed by: OBSTETRICS & GYNECOLOGY

## 2020-10-12 NOTE — PROGRESS NOTES
Pt presents for routine visit. She denies any issues today. She notes active fetal movement.   Her glucose log is reviewed: all fasting values are less than 95 since 10/6 except 1 value and pt reports she fell asleep and took PM NPH late. Her other values are all normal except 1 elevated following lunch    A/p: GDM: normal fasting values with current dose of NPH at 28 units, pt will continue this dose and monitoring. Advised daily fmc's, rtc 1 week for BPP as advised pt MFM    Hx IUGR last preg: pt has growth u/s with MFM this week. She is taking baby asa daily.

## 2020-10-16 ENCOUNTER — OFFICE VISIT (OUTPATIENT)
Dept: OBSTETRICS AND GYNECOLOGY | Facility: CLINIC | Age: 35
End: 2020-10-16

## 2020-10-16 ENCOUNTER — TRANSCRIBE ORDERS (OUTPATIENT)
Dept: ULTRASOUND IMAGING | Facility: HOSPITAL | Age: 35
End: 2020-10-16

## 2020-10-16 ENCOUNTER — TELEPHONE (OUTPATIENT)
Dept: OBSTETRICS AND GYNECOLOGY | Age: 35
End: 2020-10-16

## 2020-10-16 ENCOUNTER — HOSPITAL ENCOUNTER (OUTPATIENT)
Dept: ULTRASOUND IMAGING | Facility: HOSPITAL | Age: 35
Discharge: HOME | End: 2020-10-16
Admitting: OBSTETRICS & GYNECOLOGY

## 2020-10-16 VITALS
WEIGHT: 208.01 LBS | HEART RATE: 95 BPM | BODY MASS INDEX: 35.51 KG/M2 | SYSTOLIC BLOOD PRESSURE: 98 MMHG | DIASTOLIC BLOOD PRESSURE: 64 MMHG | TEMPERATURE: 97.8 F | HEIGHT: 64 IN

## 2020-10-16 DIAGNOSIS — O09.529 ANTEPARTUM MULTIGRAVIDA OF ADVANCED MATERNAL AGE: ICD-10-CM

## 2020-10-16 DIAGNOSIS — O24.414 INSULIN CONTROLLED GESTATIONAL DIABETES MELLITUS (GDM) IN SECOND TRIMESTER: Primary | ICD-10-CM

## 2020-10-16 DIAGNOSIS — Z87.59 HISTORY OF PRIOR PREGNANCY WITH IUGR NEWBORN: ICD-10-CM

## 2020-10-16 DIAGNOSIS — O24.410 DIET CONTROLLED GESTATIONAL DIABETES MELLITUS (GDM), ANTEPARTUM: ICD-10-CM

## 2020-10-16 PROCEDURE — 76816 OB US FOLLOW-UP PER FETUS: CPT

## 2020-10-16 PROCEDURE — 76816 OB US FOLLOW-UP PER FETUS: CPT | Performed by: OBSTETRICS & GYNECOLOGY

## 2020-10-16 PROCEDURE — 99213 OFFICE O/P EST LOW 20 MIN: CPT | Performed by: OBSTETRICS & GYNECOLOGY

## 2020-10-16 NOTE — PROGRESS NOTES
"MATERNAL FETAL MEDICINE OUTPATIENT NOTE     Dear Dr Shelton    This is a  followup visit.     Thank you for requesting my service to provide consultation for Ynes Flores is a 34 y.o.   at  27 3/7 weeks gestation (Estimated Date of Delivery: 21).   She is being seen for:diabetes in pregnancy management, medication management, and fetal assessment.     Today, she denies headache, blurry vision, RUQ pain. Reports active fetal movement. No vaginal bleeding, no contractions.     Chief complaint    ICD-10-CM ICD-9-CM   1. Insulin controlled gestational diabetes mellitus (GDM) in second trimester  O24.414 648.83   2. History of prior pregnancy with IUGR   Z87.59 V13.29   3. Antepartum multigravida of advanced maternal age  O09.529 659.63       Past obstetric, gynecological, medical, surgical, family and social history reviewed; no changes made.     Review of systems  Constitutional:  No Weight Change, No Fever, No Chills, No Fatigue, No Malaise  ENT/Mouth:  No Hearing Changes, No Sinus Pain, No Hoarseness, No sore throat, No   Eyes:  No Eye Pain, No Vision Changes  Cardiovascular:  No Chest Pain, No SOB, No Palpitations  Respiratory:  No Cough, No Wheezing, No Dyspnea  Gastrointestinal:  No Nausea, No Vomiting, No Diarrhea, No Constipation, No Pain   Genitourinary:   No Dysuria, No Urinary Frequency, No Hematuria, No Flank Pain  Musculoskeletal:  No Arthralgias, No Myalgias,   Skin:  No Skin Lesions, No Pruritis,   Neuro:  No Weakness, No Numbness, No Loss of Consciousness, No Syncope  Psych:  No Memory Changes, No Violence/Abuse Hx., No Eating Concerns  Heme/Lymph:  No Bruising, No Bleeding  Endocrine:   No Temperature Intolerance    Vitals:    10/16/20 0937   BP: 98/64   BP Location: Left arm   Patient Position: Sitting   Cuff Size: Large Adult   Pulse: 95   Temp: 97.8 °F (36.6 °C)   Weight: 94.4 kg (208 lb 0.2 oz)   Height: 162.6 cm (64\")       PHYSICAL EXAM   GENERAL: Not in acute distress, gravid "   ABDOMINAL: No fundal tenderness, no rebound or guarding   EXTREMITIES: Bilaterally lower extremities No edema / tenderness  PELVIC: No obvious vaginal discharge / bleeding    ULTRASOUND   Please view full ultrasound note on Imaging tab in [facility].      ASSESSMENT   34 y.o.   at  27 3/7 weeks gestation (Estimated Date of Delivery: 21), reassuring fetal and maternal status.     1. Single live intrauterine pregnancy   [ X ] stable  [   ] improving [  ] worsening    2.  Gestational Diabetes A2   [ X ] stable  [   ] improving [  ] worsening  [  ]  well-controlled  [  ] good-control  [ X ] fair-control [  ] poorly-controlled  [  ] non-compliant      INSULIN MEDICATION   Morning AM - no medication no changes.   Evening PM - NPH 28 units at night     Glucose log  Fasting > 50% normal   Postprandial values > 50% normal      -Reviewed goals of fasting 60 - 95 and 2 hour postprandial< 120 throughout pregnancy.  -Counseled regarding risk of hypoglycemia (maternal and fetal risks)   -Recommend emailing/fax/submitting her log weekly for review to her primary OB office; to bring log to all MFM office visits.  -Recommend contacting her primary OB office if >50% of any values are abnormal or any are over 200.     3. Obesity   [ X ] stable  [   ] improving [  ] worsening    Obesity increases the risk of hypertensive disorders, diabetes and operative delivery. We discussed recommended weight gain of 11-20 lb during pregnancy and discussed the importance of diet modifications and exercise. Technical limitations (eg, maternal obesity, prone fetal position, and late gestation) can make a detailed heart evaluation very difficult due to acoustic shadowing. Therefore, fetal anatomy is suboptimal and will likely remain suboptimal for the remainder of the pregnancy.          RECOMMENDATIONS  -Recommend emailing/fax/submitting her log weekly for review to her primary OB office; to bring log to all MFM office visits.  -Recommend  contacting her primary OB office if >50% of any values are abnormal or any are over 200.   -Starting at 28  weeks: Weekly fetal  surveillance  until delivery   -Starting at 28 weeks: Fetal movement instructions given continue daily until delivery; instructed to report to labor and delivery if cannot achieve more than 10 kicks in one hour or if she perceives a decrease in fetal movement  - If not previously done, primary OB should order - EKG then maternal echocardiogram if EKG abnormal, TSH with free T4, baseline creatinine, 24 hour urine protein   - Recommend patient to see ophthalmology and podiarty if not seen in last year     DELIVERY TIMING   Per ACOG, pregestational diabetes well controlled - 39 0/7 to 39 6/7 weeks gestation      This note has been routed to the referring obstetricians/medical provider.   Thank you for your clinical consult.     BREONNA HALL MD MPH FACOG  MATERNAL FETAL MEDICINE

## 2020-10-16 NOTE — TELEPHONE ENCOUNTER
Patient called and states she has been constipated for three days and she is starting to feel bloated.  She is wanting to know what she can take OTC.  She is currently 27 weeks pregnant.  Please advise.

## 2020-10-18 ENCOUNTER — TELEPHONE (OUTPATIENT)
Dept: OBSTETRICS AND GYNECOLOGY | Age: 35
End: 2020-10-18

## 2020-10-21 ENCOUNTER — TELEPHONE (OUTPATIENT)
Dept: OBSTETRICS AND GYNECOLOGY | Age: 35
End: 2020-10-21

## 2020-10-21 DIAGNOSIS — O24.414 INSULIN CONTROLLED GESTATIONAL DIABETES MELLITUS (GDM) IN SECOND TRIMESTER: Primary | ICD-10-CM

## 2020-10-21 NOTE — TELEPHONE ENCOUNTER
----- Message from Fide Shelton MD sent at 10/21/2020  2:45 PM EDT -----  Please call Ynes. The [facility] MD wanted her to have an EKG and some additional labs. I placed order for EKG at [facility]. Please confirm with schedulers for location of testing. Patient can go in at her convenience.

## 2020-10-21 NOTE — TELEPHONE ENCOUNTER
I spoke with Ynes and I will forward this message to Pepper in scheduling (hand delivered pt's info as well)

## 2020-10-23 ENCOUNTER — ROUTINE PRENATAL (OUTPATIENT)
Dept: OBSTETRICS AND GYNECOLOGY | Age: 35
End: 2020-10-23

## 2020-10-23 ENCOUNTER — PROCEDURE VISIT (OUTPATIENT)
Dept: OBSTETRICS AND GYNECOLOGY | Age: 35
End: 2020-10-23

## 2020-10-23 VITALS — WEIGHT: 206.4 LBS | DIASTOLIC BLOOD PRESSURE: 60 MMHG | BODY MASS INDEX: 35.43 KG/M2 | SYSTOLIC BLOOD PRESSURE: 100 MMHG

## 2020-10-23 DIAGNOSIS — Z3A.28 28 WEEKS GESTATION OF PREGNANCY: Primary | ICD-10-CM

## 2020-10-23 DIAGNOSIS — O09.529 ANTEPARTUM MULTIGRAVIDA OF ADVANCED MATERNAL AGE: ICD-10-CM

## 2020-10-23 DIAGNOSIS — O24.414 INSULIN CONTROLLED GESTATIONAL DIABETES MELLITUS (GDM) IN SECOND TRIMESTER: Primary | ICD-10-CM

## 2020-10-23 DIAGNOSIS — Z13.29 SCREENING FOR THYROID DISORDER: ICD-10-CM

## 2020-10-23 DIAGNOSIS — O24.414 INSULIN CONTROLLED GESTATIONAL DIABETES MELLITUS (GDM) IN SECOND TRIMESTER: ICD-10-CM

## 2020-10-23 PROCEDURE — 90715 TDAP VACCINE 7 YRS/> IM: CPT | Performed by: OBSTETRICS & GYNECOLOGY

## 2020-10-23 PROCEDURE — 76819 FETAL BIOPHYS PROFIL W/O NST: CPT | Performed by: OBSTETRICS & GYNECOLOGY

## 2020-10-23 PROCEDURE — 90471 IMMUNIZATION ADMIN: CPT | Performed by: OBSTETRICS & GYNECOLOGY

## 2020-10-23 PROCEDURE — 0502F SUBSEQUENT PRENATAL CARE: CPT | Performed by: OBSTETRICS & GYNECOLOGY

## 2020-10-23 NOTE — PROGRESS NOTES
Pt presents for routine visit. She denies any issues today. She notes her fasting blood sugars have increased recently up to 105. She denies ctx/vag bleeding or leaking fluid. She notes active fetal movement  O: BPP 8/8, larisa normal  A/p: GDM: recent fasting glucose values have increased. Pt currently taking 28 units NPH in pm. Will increase to 30 units; pt will monitor and increase further to 32 units if her fasting values remain elevated. Her PP values are rarely elevated. Continue weekly bpp and daily fmc's    Hx IUGR: pt on baby asa, growth u/s was appropriate with MFM this week.     Recommend tdap and pt agrees, will proceed today; declines flu shot

## 2020-10-24 LAB
T4 FREE SERPL-MCNC: 1 NG/DL (ref 0.93–1.7)
TSH SERPL DL<=0.005 MIU/L-ACNC: 0.4 UIU/ML (ref 0.27–4.2)

## 2020-10-26 ENCOUNTER — TELEPHONE (OUTPATIENT)
Dept: OBSTETRICS AND GYNECOLOGY | Age: 35
End: 2020-10-26

## 2020-10-26 NOTE — TELEPHONE ENCOUNTER
----- Message from Fide Shelton MD sent at 10/25/2020  8:02 AM EDT -----  Please call Ynes, her thyroid labs are normal

## 2020-10-28 ENCOUNTER — TRANSCRIBE ORDERS (OUTPATIENT)
Dept: CARDIOLOGY | Facility: HOSPITAL | Age: 35
End: 2020-10-28

## 2020-10-28 ENCOUNTER — ROUTINE PRENATAL (OUTPATIENT)
Dept: OBSTETRICS AND GYNECOLOGY | Age: 35
End: 2020-10-28

## 2020-10-28 ENCOUNTER — HOSPITAL ENCOUNTER (OUTPATIENT)
Dept: CARDIOLOGY | Facility: HOSPITAL | Age: 35
Discharge: HOME | End: 2020-10-28
Admitting: OBSTETRICS & GYNECOLOGY

## 2020-10-28 VITALS — DIASTOLIC BLOOD PRESSURE: 60 MMHG | SYSTOLIC BLOOD PRESSURE: 104 MMHG | WEIGHT: 206 LBS | BODY MASS INDEX: 35.36 KG/M2

## 2020-10-28 DIAGNOSIS — Z3A.29 29 WEEKS GESTATION OF PREGNANCY: Primary | ICD-10-CM

## 2020-10-28 DIAGNOSIS — Z01.811 PRE-OP CHEST EXAM: Primary | ICD-10-CM

## 2020-10-28 LAB
CLARITY, POC: CLEAR
COLOR UR: YELLOW
GLUCOSE UR STRIP-MCNC: NEGATIVE MG/DL
PROT UR STRIP-MCNC: NEGATIVE MG/DL
QT INTERVAL: 360 MS

## 2020-10-28 PROCEDURE — 0502F SUBSEQUENT PRENATAL CARE: CPT | Performed by: OBSTETRICS & GYNECOLOGY

## 2020-10-28 PROCEDURE — 93010 ELECTROCARDIOGRAM REPORT: CPT | Performed by: INTERNAL MEDICINE

## 2020-10-28 PROCEDURE — 93005 ELECTROCARDIOGRAM TRACING: CPT

## 2020-10-28 NOTE — PROGRESS NOTES
Pt presents for OB visit. She deneis any issues today. She increased her NPH to 32 units last pm due to fasting values being slightly increased.   Fasting this week: 87 to 98, pp values normal except 1 at 125    A/p: GDM: glucose values are overall good. Recommend she increase PM NPH to 34 units this pm as fasting still 95 today. She will continue weekly BPP and daily fmc's. She will have her BPP with MFM this week.     rtc 1 weeks    Hx IUGR: pt on baby asa, growth u/s with MFM adequate at 28 weeks

## 2020-10-29 LAB
PROT 24H UR-MRATE: 140 MG/24HOURS (ref 0–150)
PROT UR-MCNC: 7 MG/DL

## 2020-10-30 ENCOUNTER — TRANSCRIBE ORDERS (OUTPATIENT)
Dept: ULTRASOUND IMAGING | Facility: HOSPITAL | Age: 35
End: 2020-10-30

## 2020-10-30 ENCOUNTER — HOSPITAL ENCOUNTER (OUTPATIENT)
Dept: ULTRASOUND IMAGING | Facility: HOSPITAL | Age: 35
Discharge: HOME | End: 2020-10-30
Admitting: OBSTETRICS & GYNECOLOGY

## 2020-10-30 ENCOUNTER — TELEPHONE (OUTPATIENT)
Dept: OBSTETRICS AND GYNECOLOGY | Age: 35
End: 2020-10-30

## 2020-10-30 ENCOUNTER — OFFICE VISIT (OUTPATIENT)
Dept: OBSTETRICS AND GYNECOLOGY | Facility: CLINIC | Age: 35
End: 2020-10-30

## 2020-10-30 VITALS
WEIGHT: 207.13 LBS | SYSTOLIC BLOOD PRESSURE: 127 MMHG | TEMPERATURE: 97.5 F | HEIGHT: 64 IN | BODY MASS INDEX: 35.36 KG/M2 | HEART RATE: 90 BPM | DIASTOLIC BLOOD PRESSURE: 62 MMHG

## 2020-10-30 DIAGNOSIS — E66.9 OBESITY (BMI 30-39.9): ICD-10-CM

## 2020-10-30 DIAGNOSIS — O24.414 INSULIN CONTROLLED GESTATIONAL DIABETES MELLITUS (GDM) IN SECOND TRIMESTER: ICD-10-CM

## 2020-10-30 DIAGNOSIS — O09.529 ANTEPARTUM MULTIGRAVIDA OF ADVANCED MATERNAL AGE: ICD-10-CM

## 2020-10-30 DIAGNOSIS — O09.529 ANTEPARTUM MULTIGRAVIDA OF ADVANCED MATERNAL AGE: Primary | ICD-10-CM

## 2020-10-30 DIAGNOSIS — R63.8 INCREASED BMI: ICD-10-CM

## 2020-10-30 DIAGNOSIS — O24.414 INSULIN CONTROLLED GESTATIONAL DIABETES MELLITUS (GDM) IN SECOND TRIMESTER: Primary | ICD-10-CM

## 2020-10-30 PROCEDURE — 76819 FETAL BIOPHYS PROFIL W/O NST: CPT

## 2020-10-30 PROCEDURE — 76819 FETAL BIOPHYS PROFIL W/O NST: CPT | Performed by: OBSTETRICS & GYNECOLOGY

## 2020-10-30 PROCEDURE — 99213 OFFICE O/P EST LOW 20 MIN: CPT | Performed by: OBSTETRICS & GYNECOLOGY

## 2020-10-30 NOTE — TELEPHONE ENCOUNTER
----- Message from Fide Shelton MD sent at 10/30/2020  8:42 AM EDT -----  Call pt, her baseline 24 hour is normal

## 2020-10-30 NOTE — PROGRESS NOTES
"MATERNAL FETAL MEDICINE OUTPATIENT NOTE     Dear Dr Shelton    This is a followup visit.     Thank you for requesting my service to provide consultation for Ynes Flores is a 34 y.o.   at  29 3/7 weeks gestation (Estimated Date of Delivery: 21).   She is being seen for:diabetes in pregnancy management, medication management, and fetal assessment.   Maintains daily fetal movement counts reports > 10 movements per hour.   Today, she denies headache, blurry vision, RUQ pain. No vaginal bleeding, no contractions.     Chief complaint    ICD-10-CM ICD-9-CM   1. Insulin controlled gestational diabetes mellitus (GDM) in second trimester  O24.414 648.83   2. Antepartum multigravida of advanced maternal age  O09.529 659.63   3. Obesity (BMI 30-39.9)  E66.9 278.00       Past obstetric, gynecological, medical, surgical, family and social history reviewed; no changes made.     Review of systems  Constitutional:  No Weight Change, No Fever, No Chills, No Fatigue, No Malaise  ENT/Mouth:  No Hearing Changes, No Sinus Pain, No Hoarseness, No sore throat, No   Eyes:  No Eye Pain, No Vision Changes  Cardiovascular:  No Chest Pain, No SOB, No Palpitations  Respiratory:  No Cough, No Wheezing, No Dyspnea  Gastrointestinal:  No Nausea, No Vomiting, No Diarrhea, No Constipation, No Pain   Genitourinary:   No Dysuria, No Urinary Frequency, No Hematuria, No Flank Pain  Musculoskeletal:  No Arthralgias, No Myalgias,   Skin:  No Skin Lesions, No Pruritis,   Neuro:  No Weakness, No Numbness, No Loss of Consciousness, No Syncope  Psych:  No Memory Changes, No Violence/Abuse Hx., No Eating Concerns  Heme/Lymph:  No Bruising, No Bleeding  Endocrine:   No Temperature Intolerance    Vitals:    10/30/20 1237   BP: 127/62   BP Location: Right arm   Patient Position: Sitting   Cuff Size: Adult   Pulse: 90   Temp: 97.5 °F (36.4 °C)   TempSrc: Infrared   Weight: 94 kg (207 lb 2 oz)   Height: 162.6 cm (64\")       PHYSICAL EXAM   GENERAL: Not " in acute distress, gravid   ABDOMINAL: No fundal tenderness, no rebound or guarding   EXTREMITIES: Bilaterally lower extremities No edema / tenderness  PELVIC: No obvious vaginal discharge / bleeding    ULTRASOUND   Please view full ultrasound note on Imaging tab in [facility].      ASSESSMENT   34 y.o.   at  29 3/7  weeks gestation (Estimated Date of Delivery: 21), reassuring fetal and maternal status.     1. Single live intrauterine pregnancy   [ X ] stable  [   ] improving [  ] worsening      2.  Gestational Diabetes A2   [ X ] stable  [   ] improving [  ] worsening  [  ]  well-controlled  [  ] good-control  [ X ] fair-control [  ] poorly-controlled  [  ] non-compliant        INSULIN MEDICATION   Morning AM - no medication; no changes.   Evening PM - NPH 34 units at night (recently increased)      Glucose log  Fasting > 50% normal   Postprandial values > 50% normal        -Reviewed goals of fasting 60 - 95 and 2 hour postprandial< 120 throughout pregnancy.  -Counseled regarding risk of hypoglycemia (maternal and fetal risks)   -Recommend emailing/fax/submitting her log weekly for review to her primary OB office; to bring log to all MFM office visits.  -Recommend contacting her primary OB office if >50% of any values are abnormal or any are over 200.      3. Obesity   [ X ] stable  [   ] improving [  ] worsening     See prior consultation          RECOMMENDATIONS  -Recommend emailing/fax/submitting her log weekly for review to her primary OB office; to bring log to all MFM office visits.  -Recommend contacting her primary OB office if >50% of any values are abnormal or any are over 200.   -With Primary OB: Weekly fetal  surveillance  until delivery   -Fetal movement instructions given continue daily until delivery; instructed to report to labor and delivery if cannot achieve more than 10 kicks in one hour or if she perceives a decrease in fetal movement  -Followup with MFM in 3 weeks      DELIVERY TIMING   Per ACOG, pregestational or gestational diabetes on medication  well controlled - 39 0/7 to 39 6/7 weeks gestation  Delivery in 38th week if still requires increasing nighttime insulin     This note has been routed to the referring obstetricians/medical provider.   Thank you for your clinical consult.     BREONNA HALL MD MPH FACOG  MATERNAL FETAL MEDICINE

## 2020-11-04 ENCOUNTER — PROCEDURE VISIT (OUTPATIENT)
Dept: OBSTETRICS AND GYNECOLOGY | Age: 35
End: 2020-11-04

## 2020-11-04 ENCOUNTER — ROUTINE PRENATAL (OUTPATIENT)
Dept: OBSTETRICS AND GYNECOLOGY | Age: 35
End: 2020-11-04

## 2020-11-04 VITALS — SYSTOLIC BLOOD PRESSURE: 116 MMHG | WEIGHT: 204 LBS | DIASTOLIC BLOOD PRESSURE: 70 MMHG | BODY MASS INDEX: 35.02 KG/M2

## 2020-11-04 DIAGNOSIS — O24.414 INSULIN CONTROLLED GESTATIONAL DIABETES MELLITUS (GDM) IN SECOND TRIMESTER: Primary | ICD-10-CM

## 2020-11-04 DIAGNOSIS — E66.9 OBESITY (BMI 30-39.9): ICD-10-CM

## 2020-11-04 DIAGNOSIS — O09.529 ANTEPARTUM MULTIGRAVIDA OF ADVANCED MATERNAL AGE: ICD-10-CM

## 2020-11-04 DIAGNOSIS — R63.8 INCREASED BMI: ICD-10-CM

## 2020-11-04 DIAGNOSIS — Z13.89 SCREENING FOR BLOOD OR PROTEIN IN URINE: ICD-10-CM

## 2020-11-04 LAB
GLUCOSE UR STRIP-MCNC: NEGATIVE MG/DL
PROT UR STRIP-MCNC: NEGATIVE MG/DL

## 2020-11-04 PROCEDURE — 76819 FETAL BIOPHYS PROFIL W/O NST: CPT | Performed by: OBSTETRICS & GYNECOLOGY

## 2020-11-04 PROCEDURE — 0502F SUBSEQUENT PRENATAL CARE: CPT | Performed by: NURSE PRACTITIONER

## 2020-11-04 NOTE — PROGRESS NOTES
Chief Complaint   Patient presents with   • Routine Prenatal Visit       HPI: 35 y.o.  at 30w1d     BPP 8/8, ROSALEE 9, , vertex  + FM, Denies LOF, bleeding or ctx's  GDM - glucose log reviewed. Few fasting slightly elevated. Also had two PP readings of 136 and 190 (after eating birthday cake)  Increased NPH to 36 units on 10/30.   Hx of IUGR - taking baby asa daily  Pt of Dr. Cat Matthews:    20 1146   BP: 116/70   Weight: 92.5 kg (204 lb)       ROS:  GI:  Negative  : Negative  Pulmonary: Negative     A/P  1. Intrauterine pregnancy at 30w1d   2. Pregnancy Risk:  HIGH RISK    Diagnoses and all orders for this visit:    1. Insulin controlled gestational diabetes mellitus (GDM) in second trimester (Primary)    2. Screening for blood or protein in urine  -     POC Urinalysis Dipstick    3. Increased BMI    4. Antepartum multigravida of advanced maternal age        -----------------------  PLAN:   Increase NPH to 38 units due to several elevated fasting glucose readings  Continue baby asa daily  Continue with weekly BPP's   Discussed s/s PTL, kick counts and warning signs, to ANNE if any occur  F/u 1 week  No follow-ups on file.      Jessy Gamez, LIEN  2020 12:24 EST

## 2020-11-04 NOTE — PROGRESS NOTES
I have reviewed the notes, assessments, and/or procedures performed by LOLY Singleton, I concur with her/his documentation of Ynes Flores.

## 2020-11-10 ENCOUNTER — PROCEDURE VISIT (OUTPATIENT)
Dept: OBSTETRICS AND GYNECOLOGY | Age: 35
End: 2020-11-10

## 2020-11-10 ENCOUNTER — ROUTINE PRENATAL (OUTPATIENT)
Dept: OBSTETRICS AND GYNECOLOGY | Age: 35
End: 2020-11-10

## 2020-11-10 VITALS — DIASTOLIC BLOOD PRESSURE: 60 MMHG | WEIGHT: 205.6 LBS | SYSTOLIC BLOOD PRESSURE: 100 MMHG | BODY MASS INDEX: 35.29 KG/M2

## 2020-11-10 DIAGNOSIS — O24.414 INSULIN CONTROLLED GESTATIONAL DIABETES MELLITUS (GDM) IN SECOND TRIMESTER: Primary | ICD-10-CM

## 2020-11-10 DIAGNOSIS — Z87.59 HISTORY OF PRIOR PREGNANCY WITH IUGR NEWBORN: ICD-10-CM

## 2020-11-10 DIAGNOSIS — Z3A.31 31 WEEKS GESTATION OF PREGNANCY: Primary | ICD-10-CM

## 2020-11-10 DIAGNOSIS — O09.529 ANTEPARTUM MULTIGRAVIDA OF ADVANCED MATERNAL AGE: ICD-10-CM

## 2020-11-10 DIAGNOSIS — R63.8 INCREASED BMI: ICD-10-CM

## 2020-11-10 PROCEDURE — 0502F SUBSEQUENT PRENATAL CARE: CPT | Performed by: OBSTETRICS & GYNECOLOGY

## 2020-11-10 PROCEDURE — 76819 FETAL BIOPHYS PROFIL W/O NST: CPT | Performed by: OBSTETRICS & GYNECOLOGY

## 2020-11-10 PROCEDURE — 76816 OB US FOLLOW-UP PER FETUS: CPT | Performed by: OBSTETRICS & GYNECOLOGY

## 2020-11-10 NOTE — PROGRESS NOTES
Pt presents for f/u visit. She notes active fetal movement and denies any issues. She is taking NPH insulin 38 units at night.   Glucose log: fasting values are 91 to 101. Values last 2 days are 95 and 101. PP values are overall normal except after occ desert.     O: u/s: growth appropriate overall at 33 %, AC at 35 %. BPP = 8/8    A/p: GDM: will increase PM NPH to 42 units tonight and pt will monitor early am value for 2 days to assure no hypoglycemia. She will call in to review values in 2 days    Hx IUGR: growth appropriate overall today. HC small but suspect related to position. Pt had normal targeted u/s with MFM. She will continue baby through 36 weeks

## 2020-11-13 ENCOUNTER — TELEPHONE (OUTPATIENT)
Dept: DIABETES SERVICES | Facility: HOSPITAL | Age: 35
End: 2020-11-13

## 2020-11-13 NOTE — TELEPHONE ENCOUNTER
Call pt to f/u in between MD visits on FBGs/trend. Pt reports insulin dose was adjusted (11/10) last visit and denies any episodes low BG (<70) at early a.m BG check. FBGs since insulin titration 83, 94, 100. Advise pt to cont same insulin dose as she has only taken 3 doses. Encourage pt to try walking after dinner to help FBG. Pt has OB f/u Monday 11/16/2020.

## 2020-11-16 ENCOUNTER — ROUTINE PRENATAL (OUTPATIENT)
Dept: OBSTETRICS AND GYNECOLOGY | Age: 35
End: 2020-11-16

## 2020-11-16 ENCOUNTER — PROCEDURE VISIT (OUTPATIENT)
Dept: OBSTETRICS AND GYNECOLOGY | Age: 35
End: 2020-11-16

## 2020-11-16 VITALS — BODY MASS INDEX: 35.22 KG/M2 | SYSTOLIC BLOOD PRESSURE: 112 MMHG | WEIGHT: 205.2 LBS | DIASTOLIC BLOOD PRESSURE: 64 MMHG

## 2020-11-16 DIAGNOSIS — R63.8 INCREASED BMI: ICD-10-CM

## 2020-11-16 DIAGNOSIS — Z13.89 SCREENING FOR HEMATURIA OR PROTEINURIA: ICD-10-CM

## 2020-11-16 DIAGNOSIS — O09.529 ANTEPARTUM MULTIGRAVIDA OF ADVANCED MATERNAL AGE: Primary | ICD-10-CM

## 2020-11-16 DIAGNOSIS — Z3A.31 31 WEEKS GESTATION OF PREGNANCY: Primary | ICD-10-CM

## 2020-11-16 DIAGNOSIS — O24.414 INSULIN CONTROLLED GESTATIONAL DIABETES MELLITUS (GDM) IN SECOND TRIMESTER: ICD-10-CM

## 2020-11-16 LAB
CLARITY, POC: ABNORMAL
COLOR UR: YELLOW
GLUCOSE UR STRIP-MCNC: NEGATIVE MG/DL
PROT UR STRIP-MCNC: NEGATIVE MG/DL

## 2020-11-16 PROCEDURE — 0502F SUBSEQUENT PRENATAL CARE: CPT | Performed by: OBSTETRICS & GYNECOLOGY

## 2020-11-16 PROCEDURE — 76819 FETAL BIOPHYS PROFIL W/O NST: CPT | Performed by: OBSTETRICS & GYNECOLOGY

## 2020-11-16 NOTE — PROGRESS NOTES
Pt presents for routine visit. She denies reg ctx, vag bleeding or leaking fluid. She notes active fetal movement.   O: Glucose log: fasting 82 to 105. PP values 82 to 135.   BPP 8/8, larisa normal      GDM: 2 fasting values elevated since increasing her insulin dose to 42. 1 was likely related to late dosing that pm.Overall improved from prior visit so will continue NPH at current dose, 42 units at bedtime. Continue weekly BPP and daily fmc's. Pt will be out-of-town next week so will resume asap after return. Advised her to avoid exposure and reviewed DVT precautions    Hx IUGR: growth adequate with u/s last week. Repeat growth Q 4 weeks and continue baby asa until 36 weeks. Pt also has f/u visit with MFM this week.     AMA: pt already having weekly BPP. She declines aneuploidy screening and has f/u u/s with [facility] this week.

## 2020-11-20 ENCOUNTER — OFFICE VISIT (OUTPATIENT)
Dept: OBSTETRICS AND GYNECOLOGY | Facility: CLINIC | Age: 35
End: 2020-11-20

## 2020-11-20 ENCOUNTER — TRANSCRIBE ORDERS (OUTPATIENT)
Dept: ULTRASOUND IMAGING | Facility: HOSPITAL | Age: 35
End: 2020-11-20

## 2020-11-20 ENCOUNTER — HOSPITAL ENCOUNTER (OUTPATIENT)
Dept: ULTRASOUND IMAGING | Facility: HOSPITAL | Age: 35
Discharge: HOME | End: 2020-11-20
Admitting: OBSTETRICS & GYNECOLOGY

## 2020-11-20 VITALS
WEIGHT: 204 LBS | HEIGHT: 64 IN | BODY MASS INDEX: 34.83 KG/M2 | SYSTOLIC BLOOD PRESSURE: 116 MMHG | TEMPERATURE: 97.7 F | DIASTOLIC BLOOD PRESSURE: 69 MMHG | HEART RATE: 92 BPM

## 2020-11-20 DIAGNOSIS — R63.8 INCREASED BMI: ICD-10-CM

## 2020-11-20 DIAGNOSIS — O09.529 ANTEPARTUM MULTIGRAVIDA OF ADVANCED MATERNAL AGE: ICD-10-CM

## 2020-11-20 DIAGNOSIS — Z87.59 HISTORY OF PRIOR PREGNANCY WITH IUGR NEWBORN: ICD-10-CM

## 2020-11-20 DIAGNOSIS — O24.414 INSULIN CONTROLLED GESTATIONAL DIABETES MELLITUS (GDM) IN SECOND TRIMESTER: ICD-10-CM

## 2020-11-20 DIAGNOSIS — O24.414 INSULIN CONTROLLED GESTATIONAL DIABETES MELLITUS (GDM) IN THIRD TRIMESTER: Primary | ICD-10-CM

## 2020-11-20 DIAGNOSIS — O24.414 INSULIN CONTROLLED GESTATIONAL DIABETES MELLITUS (GDM) IN SECOND TRIMESTER: Primary | ICD-10-CM

## 2020-11-20 PROCEDURE — 76816 OB US FOLLOW-UP PER FETUS: CPT

## 2020-11-20 PROCEDURE — 76816 OB US FOLLOW-UP PER FETUS: CPT | Performed by: OBSTETRICS & GYNECOLOGY

## 2020-11-20 PROCEDURE — 99214 OFFICE O/P EST MOD 30 MIN: CPT | Performed by: OBSTETRICS & GYNECOLOGY

## 2020-11-20 PROCEDURE — 76819 FETAL BIOPHYS PROFIL W/O NST: CPT

## 2020-11-20 PROCEDURE — 76819 FETAL BIOPHYS PROFIL W/O NST: CPT | Performed by: OBSTETRICS & GYNECOLOGY

## 2020-11-20 NOTE — PROGRESS NOTES
MATERNAL FETAL MEDICINE OUTPATIENT NOTE     Dear Dr Shelton     This is a followup visit.     Thank you for requesting my service to provide consultation for Ynes Flores is a 35 y.o.   at  32 3/7 weeks gestation (Estimated Date of Delivery: 21).   She is being seen for:diabetes in pregnancy management, medication management, and fetal assessment.   Maintains daily fetal movement counts reports > 10 movements per hour.   Today, she denies headache, blurry vision, RUQ pain. No vaginal bleeding, no contractions.     Chief complaint    ICD-10-CM ICD-9-CM   1. Insulin controlled gestational diabetes mellitus (GDM) in third trimester  O24.414 648.83   2. Antepartum multigravida of advanced maternal age  O09.529 659.63   3. History of prior pregnancy with IUGR   Z87.59 V13.29   4. Increased BMI  R63.8 783.9       Past obstetric, gynecological, medical, surgical, family and social history reviewed; no changes made.     Review of systems  Constitutional:  No Weight Change, No Fever, No Chills, No Fatigue, No Malaise  ENT/Mouth:  No Hearing Changes, No Sinus Pain, No Hoarseness, No sore throat, No   Eyes:  No Eye Pain, No Vision Changes  Cardiovascular:  No Chest Pain, No SOB, No Palpitations  Respiratory:  No Cough, No Wheezing, No Dyspnea  Gastrointestinal:  No Nausea, No Vomiting, No Diarrhea, No Constipation, No Pain   Genitourinary:   No Dysuria, No Urinary Frequency, No Hematuria, No Flank Pain  Musculoskeletal:  No Arthralgias, No Myalgias,   Skin:  No Skin Lesions, No Pruritis,   Neuro:  No Weakness, No Numbness, No Loss of Consciousness, No Syncope  Psych:  No Memory Changes, No Violence/Abuse Hx., No Eating Concerns  Heme/Lymph:  No Bruising, No Bleeding  Endocrine:   No Temperature Intolerance    Vitals:    20 1315   BP: 116/69   BP Location: Right arm   Patient Position: Sitting   Cuff Size: Adult   Pulse: 92   Temp: 97.7 °F (36.5 °C)   TempSrc: Infrared   Weight: 92.5 kg (204 lb)  "  Height: 162.6 cm (64\")       PHYSICAL EXAM   GENERAL: Not in acute distress, gravid   NEURO: awake, alert and oriented to person, place, and time  ABDOMINAL: No fundal tenderness, no rebound or guarding   EXTREMITIES: Bilaterally lower extremities No edema, no tenderness  PELVIC: No obvious vaginal discharge, no bleeding    ULTRASOUND   Please view full ultrasound note on Imaging tab in [facility].      ASSESSMENT   35 y.o.   at  32 3/7  weeks gestation (Estimated Date of Delivery: 21), reassuring fetal and maternal status.     1. Single live intrauterine pregnancy   [ X ] stable  [   ] improving [  ] worsening    2.  Gestational Diabetes A2   [  ] stable  [   ] improving [ X ] worsening given increased insulin requirement but reassuring fetal status   [  ]  well-controlled  [  ] good-control  [ X ] fair-control [  ] poorly-controlled  [  ] non-compliant        INSULIN MEDICATION   Morning AM - no medication; no changes.   Evening PM - NPH 42 units at night (recently increased)      Glucose log  Fasting > 50% normal   Postprandial values > 50% normal        -Reviewed goals of fasting 60 - 95 and 2 hour postprandial< 120 throughout pregnancy.  -Counseled regarding risk of hypoglycemia (maternal and fetal risks)   -Recommend emailing/fax/submitting her log weekly for review to her primary OB office; to bring log to all MFM office visits.  -Recommend contacting her primary OB office if >50% of any values are abnormal or any are over 200.      3. Obesity   [ X ] stable  [   ] improving [  ] worsening     See prior consultation          RECOMMENDATIONS  -Followup in 4 weeks for fetal growth and delivery recommendations   -Recommend emailing/fax/submitting her log weekly for review to her primary OB office; to bring log to all MFM office visits.  -Recommend contacting her primary OB office if >50% of any values are abnormal or any are over 200.   -With Primary OB: Weekly fetal  surveillance  until " "delivery   -Fetal movement instructions given continue daily until delivery; instructed to report to labor and delivery if cannot achieve more than 10 kicks in one hour or if she perceives a decrease in fetal movement       DELIVERY TIMING   Per ACOG, pregestational or gestational diabetes on medication  well controlled - 39 0/7 to 39 6/7 weeks gestation   However, patient doesn't meet the label as \"well controlled\" because she increasingly requires more insulin at nighttime as the pregnancy progresses. Therefore, with such increased insulin requirements, there is increased risk of IUFD and worsening co-morbidities, therefore delivery planning should be in 37 - 38th week if she still requires increasing nighttime insulin.           This note has been routed to the referring obstetricians/medical provider.   Thank you for your clinical consult.     BREONNA HALL MD MPH FACOG  MATERNAL FETAL MEDICINE       "

## 2020-11-30 ENCOUNTER — ROUTINE PRENATAL (OUTPATIENT)
Dept: OBSTETRICS AND GYNECOLOGY | Age: 35
End: 2020-11-30

## 2020-11-30 ENCOUNTER — PROCEDURE VISIT (OUTPATIENT)
Dept: OBSTETRICS AND GYNECOLOGY | Age: 35
End: 2020-11-30

## 2020-11-30 VITALS — DIASTOLIC BLOOD PRESSURE: 60 MMHG | SYSTOLIC BLOOD PRESSURE: 110 MMHG | WEIGHT: 207.2 LBS | BODY MASS INDEX: 35.57 KG/M2

## 2020-11-30 DIAGNOSIS — O24.414 INSULIN CONTROLLED GESTATIONAL DIABETES MELLITUS (GDM) IN SECOND TRIMESTER: ICD-10-CM

## 2020-11-30 DIAGNOSIS — Z3A.33 33 WEEKS GESTATION OF PREGNANCY: Primary | ICD-10-CM

## 2020-11-30 DIAGNOSIS — R63.8 INCREASED BMI: ICD-10-CM

## 2020-11-30 DIAGNOSIS — O09.529 ANTEPARTUM MULTIGRAVIDA OF ADVANCED MATERNAL AGE: Primary | ICD-10-CM

## 2020-11-30 PROCEDURE — 0502F SUBSEQUENT PRENATAL CARE: CPT | Performed by: OBSTETRICS & GYNECOLOGY

## 2020-11-30 PROCEDURE — 76819 FETAL BIOPHYS PROFIL W/O NST: CPT | Performed by: OBSTETRICS & GYNECOLOGY

## 2020-11-30 NOTE — PROGRESS NOTES
Pt presents for routine visit. She denies reg ctx, vag bleeding or leaking fluid. She notes active fetal movement.     O: glucose log: fasting values 86 to 96, > 50 % normal; pp values with only 4 elevations in 8 days  BPP 8/8, larisa 10    GDM: glucose values remain stable with NPH 42 units at night. Will continue this dose. Continue daily fmc's and weekly BPP. MFM had normal growth and will re-evaluate in 3 weeks    Hx IUGR: pt on baby asa, will continue through 36 weeks. Recent growth adequate with MFM on 11/20

## 2020-12-07 ENCOUNTER — ROUTINE PRENATAL (OUTPATIENT)
Dept: OBSTETRICS AND GYNECOLOGY | Age: 35
End: 2020-12-07

## 2020-12-07 ENCOUNTER — DOCUMENTATION (OUTPATIENT)
Dept: OBSTETRICS AND GYNECOLOGY | Age: 35
End: 2020-12-07

## 2020-12-07 ENCOUNTER — PROCEDURE VISIT (OUTPATIENT)
Dept: OBSTETRICS AND GYNECOLOGY | Age: 35
End: 2020-12-07

## 2020-12-07 VITALS — DIASTOLIC BLOOD PRESSURE: 60 MMHG | WEIGHT: 204.4 LBS | SYSTOLIC BLOOD PRESSURE: 100 MMHG | BODY MASS INDEX: 35.09 KG/M2

## 2020-12-07 DIAGNOSIS — O24.414 INSULIN CONTROLLED GESTATIONAL DIABETES MELLITUS (GDM) IN SECOND TRIMESTER: ICD-10-CM

## 2020-12-07 DIAGNOSIS — O09.529 ANTEPARTUM MULTIGRAVIDA OF ADVANCED MATERNAL AGE: ICD-10-CM

## 2020-12-07 DIAGNOSIS — Z87.59 HISTORY OF PRIOR PREGNANCY WITH IUGR NEWBORN: ICD-10-CM

## 2020-12-07 DIAGNOSIS — O44.03 PLACENTA PREVIA IN THIRD TRIMESTER: ICD-10-CM

## 2020-12-07 DIAGNOSIS — Z3A.34 34 WEEKS GESTATION OF PREGNANCY: Primary | ICD-10-CM

## 2020-12-07 DIAGNOSIS — E66.9 OBESITY (BMI 30-39.9): Primary | ICD-10-CM

## 2020-12-07 PROCEDURE — 0502F SUBSEQUENT PRENATAL CARE: CPT | Performed by: OBSTETRICS & GYNECOLOGY

## 2020-12-07 PROCEDURE — 76819 FETAL BIOPHYS PROFIL W/O NST: CPT | Performed by: OBSTETRICS & GYNECOLOGY

## 2020-12-07 NOTE — PROGRESS NOTES
Pt presents for routine visit. She denies any ctx/bleeding or leaking fluid. She notes active fetal movement.    O: u/s: growth normal 5 lb 13 oz, larisa 9, BPP 8/8, posterior placenta previa noted with tv imaging    A/p: placenta previa: not noted by MFM with prior u/s evaluations. Counseled pt she will need primary c/s and reviewed risks to include bleeding, transfusion, infection, damage to internal organs, anesthetic complications, DVT/PE and death. She is counseled that risk of hemorrhage or hysterectomy are increased with previa at term. She notes understanding. Advised pelvic rest and advised she proceed immediately to BHE with any ctx, vag bleeding or leaking fluid. Advised pelvic rest. Advised she will need delivery from 36 to 37+ weeks. Will discuss with MFM    GDM: glucose values reviewed. Fasting values are 95 or less with NPH insulin 42 units Q pm. Will continue current dose. Continue daily fmc's and weekly BPP    Hx IUGR: adequate fetal growth noted. Advised pt to stop baby asa at this time.     AMA: continue weekly BPP and daily fmc's

## 2020-12-07 NOTE — PROGRESS NOTES
Contacted [facility] regarding previa on u/s today. They will move up her appt to this Friday to evaluate further and determine recommended delivery timing. They will call pt with updated appt. Called pt and left message to call if she does not receive contact from [facility] to move up appt.

## 2020-12-11 ENCOUNTER — HOSPITAL ENCOUNTER (OUTPATIENT)
Dept: ULTRASOUND IMAGING | Facility: HOSPITAL | Age: 35
Discharge: HOME | End: 2020-12-11
Admitting: OBSTETRICS & GYNECOLOGY

## 2020-12-11 ENCOUNTER — OFFICE VISIT (OUTPATIENT)
Dept: OBSTETRICS AND GYNECOLOGY | Facility: CLINIC | Age: 35
End: 2020-12-11

## 2020-12-11 VITALS
HEIGHT: 64 IN | HEART RATE: 99 BPM | BODY MASS INDEX: 34.49 KG/M2 | DIASTOLIC BLOOD PRESSURE: 73 MMHG | WEIGHT: 202 LBS | SYSTOLIC BLOOD PRESSURE: 124 MMHG | TEMPERATURE: 98 F

## 2020-12-11 DIAGNOSIS — Z03.72 PLACENTAL PROBLEM SUSPECTED BUT NOT FOUND: ICD-10-CM

## 2020-12-11 DIAGNOSIS — O09.529 ANTEPARTUM MULTIGRAVIDA OF ADVANCED MATERNAL AGE: ICD-10-CM

## 2020-12-11 DIAGNOSIS — E66.9 OBESITY (BMI 30-39.9): Primary | ICD-10-CM

## 2020-12-11 DIAGNOSIS — O24.414 INSULIN CONTROLLED GESTATIONAL DIABETES MELLITUS (GDM) IN THIRD TRIMESTER: ICD-10-CM

## 2020-12-11 DIAGNOSIS — O24.414 INSULIN CONTROLLED GESTATIONAL DIABETES MELLITUS (GDM) IN SECOND TRIMESTER: ICD-10-CM

## 2020-12-11 PROCEDURE — 76817 TRANSVAGINAL US OBSTETRIC: CPT | Performed by: OBSTETRICS & GYNECOLOGY

## 2020-12-11 PROCEDURE — 76819 FETAL BIOPHYS PROFIL W/O NST: CPT

## 2020-12-11 PROCEDURE — 99213 OFFICE O/P EST LOW 20 MIN: CPT | Performed by: OBSTETRICS & GYNECOLOGY

## 2020-12-11 PROCEDURE — 76816 OB US FOLLOW-UP PER FETUS: CPT | Performed by: OBSTETRICS & GYNECOLOGY

## 2020-12-11 PROCEDURE — 76819 FETAL BIOPHYS PROFIL W/O NST: CPT | Performed by: OBSTETRICS & GYNECOLOGY

## 2020-12-11 PROCEDURE — 76816 OB US FOLLOW-UP PER FETUS: CPT

## 2020-12-11 PROCEDURE — 76817 TRANSVAGINAL US OBSTETRIC: CPT

## 2020-12-11 NOTE — PROGRESS NOTES
MATERNAL FETAL MEDICINE OUTPATIENT NOTE     Dear Dr Shelton     This is a followup visit.     Thank you for requesting my service to provide consultation for Ynes Flores is a 35 y.o.   at  35 3 /7 weeks gestation (Estimated Date of Delivery: 21).   She is being seen for:diabetes in pregnancy management, medication management, and fetal assessment.   Suspected placenta previa on outside ultrasound   Maintains daily fetal movement counts reports > 10 movements per hour.     Today, she denies headache, blurry vision, RUQ pain. No vaginal bleeding, no contractions.     Chief complaint    ICD-10-CM ICD-9-CM   1. Obesity (BMI 30-39.9)  E66.9 278.00   2. Insulin controlled gestational diabetes mellitus (GDM) in third trimester  O24.414 648.83   3. Placental problem suspected but not found  Z03.72 V89.02       Past obstetric, gynecological, medical, surgical, family and social history reviewed; no changes made.     Review of systems  Constitutional:  No Weight Change, No Fever, No Chills, No Fatigue, No Malaise  ENT/Mouth:  No Hearing Changes, No Sinus Pain, No Hoarseness, No sore throat, No   Eyes:  No Eye Pain, No Vision Changes  Cardiovascular:  No Chest Pain, No SOB, No Palpitations  Respiratory:  No Cough, No Wheezing, No Dyspnea  Gastrointestinal:  No Nausea, No Vomiting, No Diarrhea, No Constipation, No Pain   Genitourinary:   No Dysuria, No Urinary Frequency, No Hematuria, No Flank Pain  Musculoskeletal:  No Arthralgias, No Myalgias,   Skin:  No Skin Lesions, No Pruritis,   Neuro:  No Weakness, No Numbness, No Loss of Consciousness, No Syncope  Psych:  No Memory Changes, No Violence/Abuse Hx., No Eating Concerns  Heme/Lymph:  No Bruising, No Bleeding  Endocrine:   No Temperature Intolerance    Vitals:    20 1305   BP: 124/73   BP Location: Right arm   Patient Position: Sitting   Cuff Size: Adult   Pulse: 99   Temp: 98 °F (36.7 °C)   TempSrc: Infrared   Weight: 91.6 kg (202 lb)   Height: 162.6 cm  "(64\")       PHYSICAL EXAM   GENERAL: Not in acute distress, gravid   NEURO: awake, alert and oriented to person, place, and time  ABDOMINAL: No fundal tenderness, no rebound or guarding   EXTREMITIES: Bilaterally lower extremities No edema, no tenderness  PELVIC: No obvious vaginal discharge, no bleeding    ULTRASOUND   Please view full ultrasound note on Imaging tab in [employer].      ASSESSMENT   35 y.o.   at  35 3 /7 weeks gestation (Estimated Date of Delivery: 21), reassuring fetal and maternal status. No placenta previa noted on transvaginal ultrasound. Placenta in posterio-lateral with inferior edge measuring no less than 3cm from the internal cervical os.       1. Single live intrauterine pregnancy   [ X ] stable  [   ] improving [  ] worsening    2.  Gestational Diabetes A2   [ X ] stable  [   ] improving [ ] worsening    [  ]  well-controlled  [  ] good-control  [ X ] fair-control [  ] poorly-controlled  [  ] non-compliant        INSULIN MEDICATION   Morning AM - no medication; no changes.   Evening PM - NPH 42 units at night      Glucose log  Fasting > 50% normal   Postprandial values > 50% normal        -Reviewed goals of fasting 60 - 95 and 2 hour postprandial< 120 throughout pregnancy.  -Counseled regarding risk of hypoglycemia (maternal and fetal risks)   -Recommend emailing/fax/submitting her log weekly for review to her primary OB office; to bring log to all MFM office visits.  -Recommend contacting her primary OB office if >50% of any values are abnormal or any are over 200.      - Counseled patient that maternal diabetes in pregnancy may delay fetal pulmonary maturation. However the risk of IUFD should be weighed with potential delay in fetal pulmonary maturity (and need for NICU) when making decision regarding delivery timing. Considering  the large amounts of insulin required, and increased risk of IUFD, would recommend delivery 37 - 38 weeks; sooner if fetal assessment is unfavorable " "or for any other clinical indication. Patient is aware that NICU care may be needed with the delivery of her term infant.        3. Obesity -See prior consultation   [ X ] stable  [   ] improving [  ] worsening              RECOMMENDATIONS  -Recommend emailing/fax/submitting her log weekly for review to her primary OB office; to bring log to all MFM office visits.  -Recommend contacting her primary OB office if >50% of any values are abnormal or any are over 200.   -With Primary OB: Weekly fetal  surveillance  until delivery   -Fetal movement instructions given continue daily until delivery; instructed to report to labor and delivery if cannot achieve more than 10 kicks in one hour or if she perceives a decrease in fetal movement        DELIVERY TIMING   Per ACOG, pregestational or gestational diabetes on medication  well controlled - 39 0/7 to 39 6/7 weeks gestation   However, patient doesn't meet the label as \"well controlled\" because she increasingly requires more insulin at nighttime as the pregnancy progresses. Therefore, with such increased insulin requirements, there is increased risk of IUFD and worsening co-morbidities, therefore delivery planning should be in 37 - 38th week if she still requires increasing nighttime insulin.             PLAN  -Serial growth ultrasounds every 3 - 4 weeks   -Starting at 28 - 32 weeks: Weekly fetal  surveillance until delivery   -Starting at 28 weeks: Fetal movement instructions given continue daily until delivery; instructed to report to labor and delivery if cannot achieve more than 10 kicks in one hour or if she perceives a decrease in fetal movement        This note has been routed to the referring obstetricians/medical provider.   Thank you for your clinical consult.     BREONNA HALL MD MPH FACOG  MATERNAL FETAL MEDICINE   ;  "

## 2020-12-14 ENCOUNTER — PROCEDURE VISIT (OUTPATIENT)
Dept: OBSTETRICS AND GYNECOLOGY | Age: 35
End: 2020-12-14

## 2020-12-14 ENCOUNTER — ROUTINE PRENATAL (OUTPATIENT)
Dept: OBSTETRICS AND GYNECOLOGY | Age: 35
End: 2020-12-14

## 2020-12-14 VITALS — DIASTOLIC BLOOD PRESSURE: 60 MMHG | WEIGHT: 204.6 LBS | BODY MASS INDEX: 35.12 KG/M2 | SYSTOLIC BLOOD PRESSURE: 102 MMHG

## 2020-12-14 DIAGNOSIS — Z3A.35 35 WEEKS GESTATION OF PREGNANCY: Primary | ICD-10-CM

## 2020-12-14 DIAGNOSIS — O24.414 INSULIN CONTROLLED GESTATIONAL DIABETES MELLITUS (GDM) IN SECOND TRIMESTER: ICD-10-CM

## 2020-12-14 DIAGNOSIS — Z36.85 ANTENATAL SCREENING FOR STREPTOCOCCUS B: ICD-10-CM

## 2020-12-14 DIAGNOSIS — R63.8 INCREASED BMI: Primary | ICD-10-CM

## 2020-12-14 DIAGNOSIS — O24.414 INSULIN CONTROLLED GESTATIONAL DIABETES MELLITUS (GDM) IN THIRD TRIMESTER: ICD-10-CM

## 2020-12-14 DIAGNOSIS — O44.03 PLACENTA PREVIA IN THIRD TRIMESTER: ICD-10-CM

## 2020-12-14 PROCEDURE — 76819 FETAL BIOPHYS PROFIL W/O NST: CPT | Performed by: OBSTETRICS & GYNECOLOGY

## 2020-12-14 PROCEDURE — 76817 TRANSVAGINAL US OBSTETRIC: CPT | Performed by: OBSTETRICS & GYNECOLOGY

## 2020-12-14 PROCEDURE — 0502F SUBSEQUENT PRENATAL CARE: CPT | Performed by: OBSTETRICS & GYNECOLOGY

## 2020-12-14 NOTE — PROGRESS NOTES
Pt presents for routine visit. She reports active fetal movement. She denies vag bleeding, leaking fluid or regular ctx. She saw MFM and they did not feel she had a previa. Their images showed placenta atleast 3 cm from os.     Glucose log reviewed: fasting values 91 to 100; pp values 85 to 124    U/s: BPP 8/8, larisa is 9; placenta is no longer previa; measures 2 cm from os    A/p: GDM: fasting values rising slightly. Will increase NPH to 44 units in pm. Pt advised to call if this does not result in improvement. [facility] recommends delivery 37 to 38 weeks if she is needing increased insulin dosing. Recommend IOL next week. Discussed 12/22. Pt prefers to wait until 12/26 which is still within MF recommended dates. She will continue daily fmc's and weekly BPP through delivery. Requested IOL and placed order. Plan cervidil in am 12/26 as pt had rapid progress with cervidil last time.     Previa: resolved per [facility] and images here today show placenta 2 cm from os    Hx IUGR: growth adequate last week. Continue weekly BPP. Pt has been on baby asa, will stop    GBS done today

## 2020-12-16 LAB — GP B STREP DNA SPEC QL NAA+PROBE: NEGATIVE

## 2020-12-17 ENCOUNTER — TELEPHONE (OUTPATIENT)
Dept: OBSTETRICS AND GYNECOLOGY | Age: 35
End: 2020-12-17

## 2020-12-17 NOTE — TELEPHONE ENCOUNTER
----- Message from Fide Shelton MD sent at 12/16/2020  3:18 PM EST -----  Please call Ynes. Her GBS is negative.

## 2020-12-18 ENCOUNTER — APPOINTMENT (OUTPATIENT)
Dept: ULTRASOUND IMAGING | Facility: HOSPITAL | Age: 35
End: 2020-12-18

## 2020-12-21 ENCOUNTER — ROUTINE PRENATAL (OUTPATIENT)
Dept: OBSTETRICS AND GYNECOLOGY | Age: 35
End: 2020-12-21

## 2020-12-21 VITALS — DIASTOLIC BLOOD PRESSURE: 60 MMHG | WEIGHT: 205.8 LBS | BODY MASS INDEX: 35.33 KG/M2 | SYSTOLIC BLOOD PRESSURE: 98 MMHG

## 2020-12-21 DIAGNOSIS — Z3A.36 36 WEEKS GESTATION OF PREGNANCY: Primary | ICD-10-CM

## 2020-12-21 PROCEDURE — 0502F SUBSEQUENT PRENATAL CARE: CPT | Performed by: OBSTETRICS & GYNECOLOGY

## 2020-12-21 NOTE — PROGRESS NOTES
Pt comes in for routine visit. She notes active fetal movement. She denies reg ctx, vag bleeding or leaking fluid. She increased her NPH insulin and notes glucose values have improved to upper 80's or low 90's fasting.     O: BPP 8/8, larisa is 12    A/p: GDM: improved fasting values with increase in NPH. Pt declined IOL tomorrow but agrees with IOL on 12/26. MFM advised delivery 36 to 37 weeks. Continue daily fmc's and labor warnings. Continue 44 units NPH insulin in PM     Hx IUGR: appropriate growth was noted at 34 weeks, BPP today     Hx placenta previa: resolved per MFM and u/s today confirms

## 2020-12-23 ENCOUNTER — TELEPHONE (OUTPATIENT)
Dept: DIABETES SERVICES | Facility: HOSPITAL | Age: 35
End: 2020-12-23

## 2020-12-23 NOTE — TELEPHONE ENCOUNTER
Call pt to discuss plan for insulin administration night before IOL. This RN received telephone order from Dr Cat doyle for pt to take 50% of her rx'd NPH dose night before procedure to help reduce risk of hypoglycemia in L&D. Instruct pt to take 1/2 dose NPH night prior to IOL. Pt verbalizes understanding.

## 2020-12-25 ENCOUNTER — PREP FOR SURGERY (OUTPATIENT)
Dept: OTHER | Facility: HOSPITAL | Age: 35
End: 2020-12-25

## 2020-12-25 DIAGNOSIS — O24.414 INSULIN CONTROLLED GESTATIONAL DIABETES MELLITUS (GDM) IN SECOND TRIMESTER: Primary | ICD-10-CM

## 2020-12-25 RX ORDER — SODIUM CHLORIDE 0.9 % (FLUSH) 0.9 %
10 SYRINGE (ML) INJECTION AS NEEDED
Status: CANCELLED | OUTPATIENT
Start: 2020-12-25

## 2020-12-25 RX ORDER — MISOPROSTOL 200 UG/1
800 TABLET ORAL AS NEEDED
Status: CANCELLED | OUTPATIENT
Start: 2020-12-25

## 2020-12-25 RX ORDER — MAGNESIUM CARB/ALUMINUM HYDROX 105-160MG
30 TABLET,CHEWABLE ORAL ONCE
Status: CANCELLED | OUTPATIENT
Start: 2020-12-25 | End: 2020-12-25

## 2020-12-25 RX ORDER — OXYTOCIN-SODIUM CHLORIDE 0.9% IV SOLN 30 UNIT/500ML 30-0.9/5 UT/ML-%
999 SOLUTION INTRAVENOUS ONCE
Status: CANCELLED | OUTPATIENT
Start: 2020-12-25 | End: 2020-12-25

## 2020-12-25 RX ORDER — OXYTOCIN-SODIUM CHLORIDE 0.9% IV SOLN 30 UNIT/500ML 30-0.9/5 UT/ML-%
125 SOLUTION INTRAVENOUS CONTINUOUS PRN
Status: CANCELLED | OUTPATIENT
Start: 2020-12-25

## 2020-12-25 RX ORDER — OXYTOCIN-SODIUM CHLORIDE 0.9% IV SOLN 30 UNIT/500ML 30-0.9/5 UT/ML-%
250 SOLUTION INTRAVENOUS CONTINUOUS PRN
Status: CANCELLED | OUTPATIENT
Start: 2020-12-25 | End: 2020-12-25

## 2020-12-25 RX ORDER — CARBOPROST TROMETHAMINE 250 UG/ML
250 INJECTION, SOLUTION INTRAMUSCULAR AS NEEDED
Status: CANCELLED | OUTPATIENT
Start: 2020-12-25

## 2020-12-25 RX ORDER — METHYLERGONOVINE MALEATE 0.2 MG/ML
200 INJECTION INTRAVENOUS ONCE AS NEEDED
Status: CANCELLED | OUTPATIENT
Start: 2020-12-25

## 2020-12-25 RX ORDER — SODIUM CHLORIDE 0.9 % (FLUSH) 0.9 %
3 SYRINGE (ML) INJECTION EVERY 12 HOURS SCHEDULED
Status: CANCELLED | OUTPATIENT
Start: 2020-12-25

## 2020-12-26 ENCOUNTER — HOSPITAL ENCOUNTER (INPATIENT)
Facility: HOSPITAL | Age: 35
LOS: 2 days | Discharge: HOME | End: 2020-12-28
Attending: OBSTETRICS & GYNECOLOGY | Admitting: OBSTETRICS & GYNECOLOGY

## 2020-12-26 ENCOUNTER — ANESTHESIA (OUTPATIENT)
Dept: LABOR AND DELIVERY | Facility: HOSPITAL | Age: 35
End: 2020-12-26

## 2020-12-26 ENCOUNTER — ANESTHESIA EVENT (OUTPATIENT)
Dept: LABOR AND DELIVERY | Facility: HOSPITAL | Age: 35
End: 2020-12-26

## 2020-12-26 ENCOUNTER — HOSPITAL ENCOUNTER (OUTPATIENT)
Dept: LABOR AND DELIVERY | Facility: HOSPITAL | Age: 35
Discharge: HOME | End: 2020-12-26

## 2020-12-26 DIAGNOSIS — O24.414 INSULIN CONTROLLED GESTATIONAL DIABETES MELLITUS (GDM) IN SECOND TRIMESTER: ICD-10-CM

## 2020-12-26 PROBLEM — O44.03 PLACENTA PREVIA IN THIRD TRIMESTER: Status: RESOLVED | Noted: 2020-12-07 | Resolved: 2020-12-26

## 2020-12-26 PROBLEM — O24.419 GESTATIONAL DIABETES: Status: ACTIVE | Noted: 2020-12-26

## 2020-12-26 LAB
ABO GROUP BLD: NORMAL
BLD GP AB SCN SERPL QL: NEGATIVE
DEPRECATED RDW RBC AUTO: 43.1 FL (ref 37–54)
ERYTHROCYTE [DISTWIDTH] IN BLOOD BY AUTOMATED COUNT: 13.2 % (ref 12.3–15.4)
GLUCOSE BLDC GLUCOMTR-MCNC: 74 MG/DL (ref 70–130)
GLUCOSE BLDC GLUCOMTR-MCNC: 78 MG/DL (ref 70–130)
GLUCOSE BLDC GLUCOMTR-MCNC: 91 MG/DL (ref 70–130)
GLUCOSE SERPL-MCNC: 100 MG/DL (ref 65–99)
HCT VFR BLD AUTO: 36.6 % (ref 34–46.6)
HGB BLD-MCNC: 12.1 G/DL (ref 12–15.9)
MCH RBC QN AUTO: 29.4 PG (ref 26.6–33)
MCHC RBC AUTO-ENTMCNC: 33.1 G/DL (ref 31.5–35.7)
MCV RBC AUTO: 88.8 FL (ref 79–97)
PLATELET # BLD AUTO: 246 10*3/MM3 (ref 140–450)
PMV BLD AUTO: 10.6 FL (ref 6–12)
RBC # BLD AUTO: 4.12 10*6/MM3 (ref 3.77–5.28)
RH BLD: POSITIVE
SARS-COV-2 RNA RESP QL NAA+PROBE: NOT DETECTED
T&S EXPIRATION DATE: NORMAL
WBC # BLD AUTO: 12.29 10*3/MM3 (ref 3.4–10.8)

## 2020-12-26 PROCEDURE — U0003 INFECTIOUS AGENT DETECTION BY NUCLEIC ACID (DNA OR RNA); SEVERE ACUTE RESPIRATORY SYNDROME CORONAVIRUS 2 (SARS-COV-2) (CORONAVIRUS DISEASE [COVID-19]), AMPLIFIED PROBE TECHNIQUE, MAKING USE OF HIGH THROUGHPUT TECHNOLOGIES AS DESCRIBED BY CMS-2020-01-R: HCPCS | Performed by: OBSTETRICS & GYNECOLOGY

## 2020-12-26 PROCEDURE — C1755 CATHETER, INTRASPINAL: HCPCS | Performed by: ANESTHESIOLOGY

## 2020-12-26 PROCEDURE — 59400 OBSTETRICAL CARE: CPT | Performed by: OBSTETRICS & GYNECOLOGY

## 2020-12-26 PROCEDURE — 86901 BLOOD TYPING SEROLOGIC RH(D): CPT | Performed by: OBSTETRICS & GYNECOLOGY

## 2020-12-26 PROCEDURE — 25010000002 ROPIVACAINE PER 1 MG: Performed by: ANESTHESIOLOGY

## 2020-12-26 PROCEDURE — 3E033VJ INTRODUCTION OF OTHER HORMONE INTO PERIPHERAL VEIN, PERCUTANEOUS APPROACH: ICD-10-PCS | Performed by: OBSTETRICS & GYNECOLOGY

## 2020-12-26 PROCEDURE — S0260 H&P FOR SURGERY: HCPCS | Performed by: OBSTETRICS & GYNECOLOGY

## 2020-12-26 PROCEDURE — 88307 TISSUE EXAM BY PATHOLOGIST: CPT

## 2020-12-26 PROCEDURE — 86900 BLOOD TYPING SEROLOGIC ABO: CPT | Performed by: OBSTETRICS & GYNECOLOGY

## 2020-12-26 PROCEDURE — 85027 COMPLETE CBC AUTOMATED: CPT | Performed by: OBSTETRICS & GYNECOLOGY

## 2020-12-26 PROCEDURE — 86850 RBC ANTIBODY SCREEN: CPT | Performed by: OBSTETRICS & GYNECOLOGY

## 2020-12-26 PROCEDURE — 3E0P7VZ INTRODUCTION OF HORMONE INTO FEMALE REPRODUCTIVE, VIA NATURAL OR ARTIFICIAL OPENING: ICD-10-PCS | Performed by: OBSTETRICS & GYNECOLOGY

## 2020-12-26 PROCEDURE — 10907ZC DRAINAGE OF AMNIOTIC FLUID, THERAPEUTIC FROM PRODUCTS OF CONCEPTION, VIA NATURAL OR ARTIFICIAL OPENING: ICD-10-PCS | Performed by: OBSTETRICS & GYNECOLOGY

## 2020-12-26 PROCEDURE — 82947 ASSAY GLUCOSE BLOOD QUANT: CPT | Performed by: OBSTETRICS & GYNECOLOGY

## 2020-12-26 PROCEDURE — 82962 GLUCOSE BLOOD TEST: CPT

## 2020-12-26 PROCEDURE — C1755 CATHETER, INTRASPINAL: HCPCS

## 2020-12-26 RX ORDER — EPHEDRINE SULFATE 50 MG/ML
10 INJECTION, SOLUTION INTRAVENOUS
Status: DISCONTINUED | OUTPATIENT
Start: 2020-12-26 | End: 2020-12-28 | Stop reason: HOSPADM

## 2020-12-26 RX ORDER — LIDOCAINE HYDROCHLORIDE AND EPINEPHRINE 15; 5 MG/ML; UG/ML
INJECTION, SOLUTION EPIDURAL AS NEEDED
Status: DISCONTINUED | OUTPATIENT
Start: 2020-12-26 | End: 2020-12-27 | Stop reason: SURG

## 2020-12-26 RX ORDER — SODIUM CHLORIDE 0.9 % (FLUSH) 0.9 %
1-10 SYRINGE (ML) INJECTION AS NEEDED
Status: DISCONTINUED | OUTPATIENT
Start: 2020-12-26 | End: 2020-12-28 | Stop reason: HOSPADM

## 2020-12-26 RX ORDER — SODIUM CHLORIDE, SODIUM LACTATE, POTASSIUM CHLORIDE, CALCIUM CHLORIDE 600; 310; 30; 20 MG/100ML; MG/100ML; MG/100ML; MG/100ML
125 INJECTION, SOLUTION INTRAVENOUS CONTINUOUS
Status: DISCONTINUED | OUTPATIENT
Start: 2020-12-26 | End: 2020-12-28 | Stop reason: HOSPADM

## 2020-12-26 RX ORDER — ROPIVACAINE HYDROCHLORIDE 2 MG/ML
INJECTION, SOLUTION EPIDURAL; INFILTRATION; PERINEURAL AS NEEDED
Status: DISCONTINUED | OUTPATIENT
Start: 2020-12-26 | End: 2020-12-27 | Stop reason: SURG

## 2020-12-26 RX ORDER — OXYTOCIN-SODIUM CHLORIDE 0.9% IV SOLN 30 UNIT/500ML 30-0.9/5 UT/ML-%
250 SOLUTION INTRAVENOUS CONTINUOUS PRN
Status: ACTIVE | OUTPATIENT
Start: 2020-12-26 | End: 2020-12-26

## 2020-12-26 RX ORDER — ERYTHROMYCIN 5 MG/G
OINTMENT OPHTHALMIC
Status: DISPENSED
Start: 2020-12-26 | End: 2020-12-27

## 2020-12-26 RX ORDER — OXYTOCIN-SODIUM CHLORIDE 0.9% IV SOLN 30 UNIT/500ML 30-0.9/5 UT/ML-%
2 SOLUTION INTRAVENOUS
Status: DISCONTINUED | OUTPATIENT
Start: 2020-12-26 | End: 2020-12-28 | Stop reason: HOSPADM

## 2020-12-26 RX ORDER — BISACODYL 10 MG
10 SUPPOSITORY, RECTAL RECTAL DAILY PRN
Status: DISCONTINUED | OUTPATIENT
Start: 2020-12-27 | End: 2020-12-28 | Stop reason: HOSPADM

## 2020-12-26 RX ORDER — MISOPROSTOL 200 UG/1
600 TABLET ORAL ONCE AS NEEDED
Status: DISCONTINUED | OUTPATIENT
Start: 2020-12-26 | End: 2020-12-28 | Stop reason: HOSPADM

## 2020-12-26 RX ORDER — LANOLIN
CREAM (ML) TOPICAL
Status: DISCONTINUED | OUTPATIENT
Start: 2020-12-26 | End: 2020-12-28 | Stop reason: HOSPADM

## 2020-12-26 RX ORDER — MISOPROSTOL 200 UG/1
800 TABLET ORAL AS NEEDED
Status: DISCONTINUED | OUTPATIENT
Start: 2020-12-26 | End: 2020-12-26 | Stop reason: HOSPADM

## 2020-12-26 RX ORDER — SODIUM CHLORIDE 0.9 % (FLUSH) 0.9 %
3 SYRINGE (ML) INJECTION EVERY 12 HOURS SCHEDULED
Status: DISCONTINUED | OUTPATIENT
Start: 2020-12-26 | End: 2020-12-28 | Stop reason: HOSPADM

## 2020-12-26 RX ORDER — OXYTOCIN-SODIUM CHLORIDE 0.9% IV SOLN 30 UNIT/500ML 30-0.9/5 UT/ML-%
125 SOLUTION INTRAVENOUS CONTINUOUS PRN
Status: COMPLETED | OUTPATIENT
Start: 2020-12-26 | End: 2020-12-26

## 2020-12-26 RX ORDER — PHYTONADIONE 1 MG/.5ML
INJECTION, EMULSION INTRAMUSCULAR; INTRAVENOUS; SUBCUTANEOUS
Status: DISPENSED
Start: 2020-12-26 | End: 2020-12-27

## 2020-12-26 RX ORDER — MAGNESIUM CARB/ALUMINUM HYDROX 105-160MG
30 TABLET,CHEWABLE ORAL ONCE
Status: DISCONTINUED | OUTPATIENT
Start: 2020-12-26 | End: 2020-12-28 | Stop reason: HOSPADM

## 2020-12-26 RX ORDER — METHYLERGONOVINE MALEATE 0.2 MG/ML
200 INJECTION INTRAVENOUS ONCE AS NEEDED
Status: DISCONTINUED | OUTPATIENT
Start: 2020-12-26 | End: 2020-12-28 | Stop reason: HOSPADM

## 2020-12-26 RX ORDER — SODIUM CHLORIDE 0.9 % (FLUSH) 0.9 %
10 SYRINGE (ML) INJECTION AS NEEDED
Status: DISCONTINUED | OUTPATIENT
Start: 2020-12-26 | End: 2020-12-28 | Stop reason: HOSPADM

## 2020-12-26 RX ORDER — HYDROCORTISONE 25 MG/G
1 CREAM TOPICAL AS NEEDED
Status: DISCONTINUED | OUTPATIENT
Start: 2020-12-26 | End: 2020-12-28 | Stop reason: HOSPADM

## 2020-12-26 RX ORDER — OXYCODONE HYDROCHLORIDE AND ACETAMINOPHEN 5; 325 MG/1; MG/1
1 TABLET ORAL EVERY 4 HOURS PRN
Status: DISCONTINUED | OUTPATIENT
Start: 2020-12-26 | End: 2020-12-28 | Stop reason: HOSPADM

## 2020-12-26 RX ORDER — IBUPROFEN 600 MG/1
600 TABLET ORAL EVERY 8 HOURS PRN
Status: DISCONTINUED | OUTPATIENT
Start: 2020-12-26 | End: 2020-12-28 | Stop reason: HOSPADM

## 2020-12-26 RX ORDER — CARBOPROST TROMETHAMINE 250 UG/ML
250 INJECTION, SOLUTION INTRAMUSCULAR AS NEEDED
Status: DISCONTINUED | OUTPATIENT
Start: 2020-12-26 | End: 2020-12-26 | Stop reason: HOSPADM

## 2020-12-26 RX ORDER — METHYLERGONOVINE MALEATE 0.2 MG/ML
200 INJECTION INTRAVENOUS ONCE AS NEEDED
Status: DISCONTINUED | OUTPATIENT
Start: 2020-12-26 | End: 2020-12-26 | Stop reason: HOSPADM

## 2020-12-26 RX ORDER — DOCUSATE SODIUM 100 MG/1
100 CAPSULE, LIQUID FILLED ORAL 2 TIMES DAILY
Status: DISCONTINUED | OUTPATIENT
Start: 2020-12-26 | End: 2020-12-28 | Stop reason: HOSPADM

## 2020-12-26 RX ORDER — OXYCODONE HYDROCHLORIDE AND ACETAMINOPHEN 5; 325 MG/1; MG/1
2 TABLET ORAL EVERY 6 HOURS PRN
Status: DISCONTINUED | OUTPATIENT
Start: 2020-12-26 | End: 2020-12-28 | Stop reason: HOSPADM

## 2020-12-26 RX ORDER — OXYTOCIN-SODIUM CHLORIDE 0.9% IV SOLN 30 UNIT/500ML 30-0.9/5 UT/ML-%
999 SOLUTION INTRAVENOUS ONCE
Status: COMPLETED | OUTPATIENT
Start: 2020-12-26 | End: 2020-12-26

## 2020-12-26 RX ADMIN — SODIUM CHLORIDE, POTASSIUM CHLORIDE, SODIUM LACTATE AND CALCIUM CHLORIDE 125 ML/HR: 600; 310; 30; 20 INJECTION, SOLUTION INTRAVENOUS at 14:07

## 2020-12-26 RX ADMIN — SODIUM CHLORIDE, POTASSIUM CHLORIDE, SODIUM LACTATE AND CALCIUM CHLORIDE 125 ML/HR: 600; 310; 30; 20 INJECTION, SOLUTION INTRAVENOUS at 12:25

## 2020-12-26 RX ADMIN — SODIUM CHLORIDE, POTASSIUM CHLORIDE, SODIUM LACTATE AND CALCIUM CHLORIDE 125 ML/HR: 600; 310; 30; 20 INJECTION, SOLUTION INTRAVENOUS at 04:35

## 2020-12-26 RX ADMIN — OXYTOCIN 125 ML/HR: 10 INJECTION, SOLUTION INTRAMUSCULAR; INTRAVENOUS at 21:21

## 2020-12-26 RX ADMIN — OXYTOCIN 2 MILLI-UNITS/MIN: 10 INJECTION, SOLUTION INTRAMUSCULAR; INTRAVENOUS at 12:22

## 2020-12-26 RX ADMIN — Medication 10 ML/HR: at 14:16

## 2020-12-26 RX ADMIN — EPHEDRINE SULFATE 10 MG: 50 INJECTION INTRAVENOUS at 16:48

## 2020-12-26 RX ADMIN — OXYTOCIN 999 ML/HR: 10 INJECTION, SOLUTION INTRAMUSCULAR; INTRAVENOUS at 19:57

## 2020-12-26 RX ADMIN — LIDOCAINE HYDROCHLORIDE AND EPINEPHRINE 3 ML: 15; 5 INJECTION, SOLUTION EPIDURAL at 14:11

## 2020-12-26 RX ADMIN — DINOPROSTONE 10 MG: 10 INSERT VAGINAL at 05:15

## 2020-12-26 RX ADMIN — ROPIVACAINE HYDROCHLORIDE 5 MG: 2 INJECTION, SOLUTION EPIDURAL; INFILTRATION at 14:13

## 2020-12-26 NOTE — ANESTHESIA PREPROCEDURE EVALUATION
Anesthesia Evaluation     Patient summary reviewed and Nursing notes reviewed   NPO Solid Status: > 8 hours  NPO Liquid Status: > 4 hours           Airway   Mallampati: II  Neck ROM: full  No difficulty expected  Dental - normal exam     Pulmonary     breath sounds clear to auscultation  Cardiovascular     Rhythm: regular        Neuro/Psych  GI/Hepatic/Renal/Endo    (+)   diabetes mellitus,     Musculoskeletal     Abdominal   (+) obese,    Substance History      OB/GYN    (+) Pregnant (?previa),         Other                        Anesthesia Plan    ASA 2     epidural   (  37w4d  )    Anesthetic plan, all risks, benefits, and alternatives have been provided, discussed and informed consent has been obtained with: patient.

## 2020-12-26 NOTE — ANESTHESIA PROCEDURE NOTES
Labor Epidural    Pre-sedation assessment completed: 12/26/2020 2:08 PM    Patient reassessed immediately prior to procedure    Patient location during procedure: OB  Start Time: 12/26/2020 2:09 PM  Stop Time: 12/26/2020 2:11 PM  Performed By  Anesthesiologist: Doni Mcclendon DO  Preanesthetic Checklist  Completed: patient identified, site marked, surgical consent, pre-op evaluation, timeout performed, IV checked, risks and benefits discussed and monitors and equipment checked  Prep:  Pt Position:sitting  Sterile Tech:gloves, cap, sterile barrier and mask  Prep:chlorhexidine gluconate and isopropyl alcohol  Monitoring:continuous pulse oximetry, EKG and blood pressure monitoring  Epidural Block Procedure:  Approach:midline  Guidance:landmark technique and palpation technique  Location:L4-L5  Needle Type:Tuohy  Needle Gauge:17 G  Loss of Resistance Medium: air  Loss of Resistance: 7cm  Cath Depth at skin:13 cm  Paresthesia: none  Aspiration:negative  Test Dose:negative  Med administered at 12/26/2020 2:11 PM  Number of Attempts: 1  Post Assessment:  Dressing:secured with tape, occlusive dressing applied and biopatch applied  Pt Tolerance:patient tolerated the procedure well with no apparent complications  Complications:no

## 2020-12-27 LAB
BASOPHILS # BLD AUTO: 0.04 10*3/MM3 (ref 0–0.2)
BASOPHILS NFR BLD AUTO: 0.3 % (ref 0–1.5)
DEPRECATED RDW RBC AUTO: 43.3 FL (ref 37–54)
EOSINOPHIL # BLD AUTO: 0.05 10*3/MM3 (ref 0–0.4)
EOSINOPHIL NFR BLD AUTO: 0.3 % (ref 0.3–6.2)
ERYTHROCYTE [DISTWIDTH] IN BLOOD BY AUTOMATED COUNT: 13.2 % (ref 12.3–15.4)
GLUCOSE BLDC GLUCOMTR-MCNC: 113 MG/DL (ref 70–130)
GLUCOSE BLDC GLUCOMTR-MCNC: 114 MG/DL (ref 70–130)
GLUCOSE BLDC GLUCOMTR-MCNC: 140 MG/DL (ref 70–130)
GLUCOSE BLDC GLUCOMTR-MCNC: 87 MG/DL (ref 70–130)
HCT VFR BLD AUTO: 38.1 % (ref 34–46.6)
HGB BLD-MCNC: 12.6 G/DL (ref 12–15.9)
IMM GRANULOCYTES # BLD AUTO: 0.12 10*3/MM3 (ref 0–0.05)
IMM GRANULOCYTES NFR BLD AUTO: 0.8 % (ref 0–0.5)
LYMPHOCYTES # BLD AUTO: 2.64 10*3/MM3 (ref 0.7–3.1)
LYMPHOCYTES NFR BLD AUTO: 17.7 % (ref 19.6–45.3)
MCH RBC QN AUTO: 29.7 PG (ref 26.6–33)
MCHC RBC AUTO-ENTMCNC: 33.1 G/DL (ref 31.5–35.7)
MCV RBC AUTO: 89.9 FL (ref 79–97)
MONOCYTES # BLD AUTO: 0.94 10*3/MM3 (ref 0.1–0.9)
MONOCYTES NFR BLD AUTO: 6.3 % (ref 5–12)
NEUTROPHILS NFR BLD AUTO: 11.14 10*3/MM3 (ref 1.7–7)
NEUTROPHILS NFR BLD AUTO: 74.6 % (ref 42.7–76)
NRBC BLD AUTO-RTO: 0.1 /100 WBC (ref 0–0.2)
PLATELET # BLD AUTO: 258 10*3/MM3 (ref 140–450)
PMV BLD AUTO: 10.8 FL (ref 6–12)
RBC # BLD AUTO: 4.24 10*6/MM3 (ref 3.77–5.28)
WBC # BLD AUTO: 14.93 10*3/MM3 (ref 3.4–10.8)

## 2020-12-27 PROCEDURE — 85025 COMPLETE CBC W/AUTO DIFF WBC: CPT | Performed by: OBSTETRICS & GYNECOLOGY

## 2020-12-27 PROCEDURE — 82962 GLUCOSE BLOOD TEST: CPT

## 2020-12-27 RX ADMIN — DOCUSATE SODIUM 100 MG: 100 CAPSULE, LIQUID FILLED ORAL at 08:33

## 2020-12-27 RX ADMIN — DOCUSATE SODIUM 100 MG: 100 CAPSULE, LIQUID FILLED ORAL at 21:16

## 2020-12-27 RX ADMIN — IBUPROFEN 600 MG: 600 TABLET, FILM COATED ORAL at 09:46

## 2020-12-27 RX ADMIN — IBUPROFEN 600 MG: 600 TABLET, FILM COATED ORAL at 17:25

## 2020-12-28 VITALS
HEIGHT: 64 IN | HEART RATE: 77 BPM | DIASTOLIC BLOOD PRESSURE: 78 MMHG | RESPIRATION RATE: 18 BRPM | SYSTOLIC BLOOD PRESSURE: 118 MMHG | OXYGEN SATURATION: 97 % | WEIGHT: 204 LBS | TEMPERATURE: 98.1 F | BODY MASS INDEX: 34.83 KG/M2

## 2020-12-28 LAB — GLUCOSE BLDC GLUCOMTR-MCNC: 93 MG/DL (ref 70–130)

## 2020-12-28 PROCEDURE — 82962 GLUCOSE BLOOD TEST: CPT

## 2021-02-02 ENCOUNTER — POSTPARTUM VISIT (OUTPATIENT)
Dept: OBSTETRICS AND GYNECOLOGY | Age: 36
End: 2021-02-02

## 2021-02-02 VITALS
HEIGHT: 64 IN | WEIGHT: 196.6 LBS | BODY MASS INDEX: 33.57 KG/M2 | DIASTOLIC BLOOD PRESSURE: 70 MMHG | SYSTOLIC BLOOD PRESSURE: 110 MMHG

## 2021-02-02 DIAGNOSIS — O24.414 INSULIN CONTROLLED GESTATIONAL DIABETES MELLITUS (GDM) DURING PREGNANCY, ANTEPARTUM: ICD-10-CM

## 2021-02-02 PROBLEM — O09.529 ANTEPARTUM MULTIGRAVIDA OF ADVANCED MATERNAL AGE: Status: RESOLVED | Noted: 2020-05-29 | Resolved: 2021-02-02

## 2021-02-02 PROCEDURE — 0503F POSTPARTUM CARE VISIT: CPT | Performed by: OBSTETRICS & GYNECOLOGY

## 2021-02-02 RX ORDER — CETIRIZINE HYDROCHLORIDE 10 MG/1
10 TABLET ORAL DAILY
COMMUNITY
End: 2021-06-15

## 2021-02-02 NOTE — PROGRESS NOTES
"Mathew Flores is a 35 y.o. female who presents for a postpartum visit. She is 5 weeks postpartum following a spontaneous vaginal delivery. I have fully reviewed the prenatal and intrapartum course. The delivery was at 37 gestational weeks. Outcome: spontaneous vaginal delivery. Anesthesia: epidural. Postpartum course has been uncomplicated. Baby's course has been without issues. Baby is feeding by breast. Bleeding no bleeding. Bowel function is normal. Bladder function is normal. Patient is not sexually active. Contraception method is condoms. Postpartum depression screening: negative.    The following portions of the patient's history were reviewed and updated as appropriate: allergies, current medications, past family history, past medical history, past social history, past surgical history and problem list.    Review of Systems  Pertinent items are noted in HPI.    Objective   /70   Ht 162.6 cm (64\")   Wt 89.2 kg (196 lb 9.6 oz)   LMP 03/29/2020 (Exact Date)   Breastfeeding Yes   BMI 33.75 kg/m²    General:  alert, appears stated age, cooperative and no distress    Breasts:  def, breastfeeding   Lungs: clear to auscultation bilaterally   Heart:  regular rate and rhythm   Abdomen: soft, non-tender; bowel sounds normal; no masses,  no organomegaly    Vulva:  normal   Vagina: normal vagina, no discharge, exudate, lesion, or erythema   Cervix:  no lesions   Corpus: normal size, contour, position, consistency, mobility, non-tender   Adnexa:  normal adnexa and no mass, fullness, tenderness   Rectal Exam: Not performed.     Assessment/Plan   normal postpartum exam. Pap smear not done at today's visit. Pt had a negative pap and hpv 6/2020  1. Contraception: condoms  2. Check random glucose and hgbA1c today. Pt reports she has done some fasting and pp glucose checks pp and these have been normal. Advised pt should have yearly screens for diabetes due to GDM diagnosis and she notes " understanding  3. Follow up in: 1 year or as needed.

## 2021-02-03 LAB
GLUCOSE SERPL-MCNC: 90 MG/DL (ref 65–99)
HBA1C MFR BLD: 5.8 % (ref 4.8–5.6)

## 2021-04-16 ENCOUNTER — BULK ORDERING (OUTPATIENT)
Dept: CASE MANAGEMENT | Facility: OTHER | Age: 36
End: 2021-04-16

## 2021-04-16 DIAGNOSIS — Z23 IMMUNIZATION DUE: ICD-10-CM

## 2021-06-15 ENCOUNTER — OFFICE VISIT (OUTPATIENT)
Dept: OBSTETRICS AND GYNECOLOGY | Age: 36
End: 2021-06-15

## 2021-06-15 VITALS
HEIGHT: 64 IN | SYSTOLIC BLOOD PRESSURE: 110 MMHG | DIASTOLIC BLOOD PRESSURE: 68 MMHG | BODY MASS INDEX: 36.7 KG/M2 | WEIGHT: 215 LBS

## 2021-06-15 DIAGNOSIS — Z01.419 ENCOUNTER FOR GYNECOLOGICAL EXAMINATION: Primary | ICD-10-CM

## 2021-06-15 DIAGNOSIS — Z13.1 SCREENING FOR DIABETES MELLITUS: ICD-10-CM

## 2021-06-15 PROCEDURE — 99395 PREV VISIT EST AGE 18-39: CPT | Performed by: OBSTETRICS & GYNECOLOGY

## 2021-06-15 RX ORDER — IBUPROFEN 600 MG/1
600 TABLET ORAL EVERY 6 HOURS PRN
COMMUNITY
End: 2022-08-16

## 2021-06-15 RX ORDER — MULTIPLE VITAMINS W/ MINERALS TAB 9MG-400MCG
1 TAB ORAL DAILY
COMMUNITY
End: 2022-09-16

## 2021-06-15 NOTE — PROGRESS NOTES
".  Subjective     Chief Complaint   Patient presents with   • Gynecologic Exam     AE   LAST PAP 20-, HPV-       History of Present Illness    Ynes Flores is a 35 y.o.  who presents for annual exam.  She denies any gyn issues today  She has some back pain related to nerve compression and is getting therapy for this  She is still breastfeeding and has not had menses yet. She is using condoms for contraception  The kids are all doing well.    Obstetric History:  OB History        3    Para   3    Term   3            AB        Living   3       SAB        TAB        Ectopic        Molar        Multiple   0    Live Births   3               Menstrual History:     No LMP recorded (lmp unknown).         Current contraception: condoms  History of abnormal Pap smear: no  Received Gardasil immunization: no  Perform regular self breast exam: yes - reg  Family history of uterine or ovarian cancer: yes - maternal great GM and great aunt  Family History of colon cancer: mat uncle  Family history of breast cancer: no    Mammogram: not indicated.  Colonoscopy: not indicated.  DEXA: not indicated.    Exercise: moderately active  Calcium/Vitamin D: adequate intake    The following portions of the patient's history were reviewed and updated as appropriate: allergies, current medications, past family history, past medical history, past social history, past surgical history and problem list.    Review of Systems    Review of Systems   Constitutional: Negative for fatigue.   Respiratory: Negative for shortness of breath.    Gastrointestinal: Negative for abdominal pain.   Genitourinary: Negative for dysuria. Negative for bleeding while breastfeeding  Neurological: Negative for headaches.   Psychiatric/Behavioral: Negative for dysphoric mood.         Objective   Physical Exam    /68   Ht 162.6 cm (64\")   Wt 97.5 kg (215 lb)   LMP  (LMP Unknown)   Breastfeeding Yes   BMI 36.90 kg/m²   General:   alert " and no distress   Heart: regular rate and rhythm   Lungs: clear to auscultation bilaterally   Breast: Inspection negative; no masses, retractions, nipple discharge or axillary adenopathy noted   Neck: Supple, no thyromegaly   Abdomen: soft, non-tender. Bowel sounds normal. No masses,  no organomegaly   Pelvis: External genitalia: normal general appearance  Urinary system: urethral meatus normal  Vaginal: normal mucosa without prolapse or lesions  Cervix: normal appearance  Adnexa: no masses or tenderness  Uterus: normal, nontender   Extremities: Extremities normal, atraumatic, no cyanosis or edema   Neurologic: Alert and oriented   Psychiatric: Normal affect, judgement, and mood     Assessment/Plan   Diagnoses and all orders for this visit:    1. Encounter for gynecological examination (Primary)    2. Screening for diabetes mellitus  -     Hemoglobin A1c        All questions answered.  Breast self exam technique reviewed and patient encouraged to perform self-exam monthly.  Discussed healthy lifestyle modifications.  Recommended 30 minutes of aerobic exercise five times per week.  Pap deferred as up to date and pt agrees  Discussed family hx of ovarian cancer in great GM and great aunt. Some closer relatives have had hyst with removal of ovaries. Discussed genetic testing and recommend she consider as closer relatives may have reduced risk surgically. She will consider.

## 2021-06-16 LAB — HBA1C MFR BLD: 6.3 % (ref 4.8–5.6)

## 2022-05-06 NOTE — TELEPHONE ENCOUNTER
0723 Pt in bed A&O x4 resp even and unlabored, c/o shoulder pain reports to nurse that the tylenol given has not been effective will message doctor    0832 Pt sitting up in bed finished breakfast, assessment completed, pt still c/o shoulder pain one time dose Ibuprofen given per doctor order will reassess for effectiveness, bed in low locked position, call light in reach    1010 Pt lying in bed talking on phone, asked about shoulder pain denies any relief from ibuprofen, states 7/10 doctor messaged to notify of no relief will await orders   Pt notified.

## 2022-07-25 ENCOUNTER — OFFICE VISIT (OUTPATIENT)
Dept: OBSTETRICS AND GYNECOLOGY | Age: 37
End: 2022-07-25

## 2022-07-25 VITALS
DIASTOLIC BLOOD PRESSURE: 76 MMHG | BODY MASS INDEX: 36.7 KG/M2 | WEIGHT: 215 LBS | SYSTOLIC BLOOD PRESSURE: 122 MMHG | HEIGHT: 64 IN

## 2022-07-25 DIAGNOSIS — Z87.59 HISTORY OF PRIOR PREGNANCY WITH IUGR NEWBORN: ICD-10-CM

## 2022-07-25 DIAGNOSIS — O99.210 OBESITY IN PREGNANCY, ANTEPARTUM: ICD-10-CM

## 2022-07-25 DIAGNOSIS — Z32.00 ENCOUNTER FOR CONFIRMATION OF PREGNANCY TEST RESULT WITH PHYSICAL EXAMINATION: ICD-10-CM

## 2022-07-25 DIAGNOSIS — O09.521 MULTIGRAVIDA OF ADVANCED MATERNAL AGE IN FIRST TRIMESTER: ICD-10-CM

## 2022-07-25 DIAGNOSIS — Z13.89 SCREENING FOR BLOOD OR PROTEIN IN URINE: Primary | ICD-10-CM

## 2022-07-25 DIAGNOSIS — Z01.419 ENCOUNTER FOR GYNECOLOGICAL EXAMINATION: ICD-10-CM

## 2022-07-25 LAB
BILIRUB BLD-MCNC: NEGATIVE MG/DL
CLARITY, POC: CLEAR
COLOR UR: YELLOW
GLUCOSE UR STRIP-MCNC: NEGATIVE MG/DL
KETONES UR QL: NEGATIVE
LEUKOCYTE EST, POC: ABNORMAL
NITRITE UR-MCNC: NEGATIVE MG/ML
PH UR: 5 [PH] (ref 5–8)
PROT UR STRIP-MCNC: ABNORMAL MG/DL
RBC # UR STRIP: ABNORMAL /UL
SP GR UR: 1.03 (ref 1–1.03)
UROBILINOGEN UR QL: NORMAL

## 2022-07-25 PROCEDURE — 81002 URINALYSIS NONAUTO W/O SCOPE: CPT | Performed by: NURSE PRACTITIONER

## 2022-07-25 PROCEDURE — 99214 OFFICE O/P EST MOD 30 MIN: CPT | Performed by: NURSE PRACTITIONER

## 2022-07-25 PROCEDURE — 99395 PREV VISIT EST AGE 18-39: CPT | Performed by: NURSE PRACTITIONER

## 2022-07-25 NOTE — PROGRESS NOTES
Subjective       History of Present Illness    Chief Complaint   Patient presents with   • Follow-up     preg confirm lmp 22       Ynes ZHOU Sandra is a 36 y.o. female who presents for pregnancy confirmation and AE  Patient of Dr Shelton  Ultrasound this morning shows viable IUP 9w0d,   They were planning to have one more child but this pregnancy happened sooner than expected  They are excited  She has some nausea, no vomiting  No cramping or bleeding  Hx of GDM, already trying to follow diabetic diet somewhat        OB History    Para Term  AB Living   3 3 3     3   SAB IAB Ectopic Molar Multiple Live Births           0 3      # Outcome Date GA Lbr Regulo/2nd Weight Sex Delivery Anes PTL Lv   3 Term 20 37w4d 08:08 / 00:12 2638 g (5 lb 13.1 oz) F Vag-Spont EPI N JONA      Birth Comments: scale 1   2 Term 18 39w2d 00:58 / 00:46 2702 g (5 lb 15.3 oz) M Vag-Spont EPI N JONA      Birth Comments: scale 4   1 Term 10/26/15 39w0d  3827 g (8 lb 7 oz) M Vag-Spont EPI N JONA       The following portions of the patient's history were reviewed and updated as appropriate: allergies, current medications, past family history, past medical history, past social history, past surgical history and problem list.        History of abnormal Pap smear: no  Perform regular self breast exam: yes - occasional    Mammogram: not indicated.  Colonoscopy: not indicated.  DEXA: not indicated.  Last Pap: neg/neg    Social History    Tobacco Use      Smoking status: Never Smoker      Smokeless tobacco: Never Used    Exercise: moderately active  Calcium/Vitamin D: uses supplements    The following portions of the patient's history were reviewed and updated as appropriate: allergies, current medications, past family history, past medical history, past social history, past surgical history and problem list.    Review of Systems   Constitutional: Negative.    HENT: Negative.    Eyes: Negative.    Respiratory: Negative.   "  Cardiovascular: Negative.    Gastrointestinal: Negative.    Endocrine: Negative.    Genitourinary: Negative.    Musculoskeletal: Negative.    Skin: Negative.    Allergic/Immunologic: Negative.    Neurological: Negative.    Hematological: Negative.    Psychiatric/Behavioral: Negative.          Objective   Physical Exam  Vitals reviewed.   Constitutional:       Appearance: She is well-developed.   Neck:      Thyroid: No thyroid mass.   Cardiovascular:      Rate and Rhythm: Normal rate and regular rhythm.      Heart sounds: Normal heart sounds.   Pulmonary:      Effort: Pulmonary effort is normal.      Breath sounds: Normal breath sounds.   Chest:   Breasts:      Right: No mass, nipple discharge, skin change or tenderness.      Left: No mass, nipple discharge, skin change or tenderness.       Abdominal:      Palpations: Abdomen is soft.      Tenderness: There is no abdominal tenderness.   Genitourinary:     Labia:         Right: No rash or lesion.         Left: No rash or lesion.       Vagina: Normal.      Cervix: No cervical motion tenderness, discharge or friability.      Uterus: Enlarged.       Adnexa:         Right: No mass or tenderness.          Left: No mass or tenderness.     Neurological:      Mental Status: She is alert and oriented to person, place, and time.   Psychiatric:         Behavior: Behavior normal.         /76   Ht 162.6 cm (64\")   Wt 97.5 kg (215 lb)   LMP 2022 (Exact Date)   Breastfeeding No   BMI 36.90 kg/m²     Assessment & Plan   Diagnoses and all orders for this visit:    1. Screening for blood or protein in urine (Primary)  -     POC Urinalysis Dipstick    2. Encounter for gynecological examination  -     IGP,CtNg,AptimaHPV,rfx16 / 18,45    3. Encounter for confirmation of pregnancy test result with physical examination  -     TSH  -     Hemoglobin A1c  -     OB Panel With HIV    4. History of prior pregnancy with IUGR     5. Multigravida of advanced maternal age in " first trimester    6. Obesity in pregnancy, antepartum        Breast self exam technique reviewed and patient encouraged to perform self-exam monthly.  Discussed healthy lifestyle modifications.  Pap smear done today with HPV and gc/chl  Recommended 30 minutes of aerobic exercise five times per week.  Discussed calcium needs to prevent osteoporosis  OB labs done today including HA1C and TSH  Discussed history of GDM, encouraged diabetic diet and regular exercise during pregnancy  SAB warnings reviewed  Plan follow up OB intake in 3 weeks with Dr Shelton

## 2022-07-25 NOTE — PROGRESS NOTES
I have reviewed the notes, assessments, and/or procedures performed by LOLY Real, I concur with her/his documentation of Ynes Flores.

## 2022-07-26 DIAGNOSIS — R79.89 LOW TSH LEVEL: Primary | ICD-10-CM

## 2022-07-26 LAB
ABO GROUP BLD: NORMAL
BASOPHILS # BLD AUTO: 0 X10E3/UL (ref 0–0.2)
BASOPHILS NFR BLD AUTO: 0 %
BLD GP AB SCN SERPL QL: NEGATIVE
EOSINOPHIL # BLD AUTO: 0.1 X10E3/UL (ref 0–0.4)
EOSINOPHIL NFR BLD AUTO: 1 %
ERYTHROCYTE [DISTWIDTH] IN BLOOD BY AUTOMATED COUNT: 12.5 % (ref 11.7–15.4)
HBA1C MFR BLD: 6 % (ref 4.8–5.6)
HBV SURFACE AG SERPL QL IA: NEGATIVE
HCT VFR BLD AUTO: 39.1 % (ref 34–46.6)
HCV AB S/CO SERPL IA: 0.1 S/CO RATIO (ref 0–0.9)
HGB BLD-MCNC: 13.3 G/DL (ref 11.1–15.9)
HIV 1+2 AB+HIV1 P24 AG SERPL QL IA: NON REACTIVE
IMM GRANULOCYTES # BLD AUTO: 0 X10E3/UL (ref 0–0.1)
IMM GRANULOCYTES NFR BLD AUTO: 0 %
LYMPHOCYTES # BLD AUTO: 2.4 X10E3/UL (ref 0.7–3.1)
LYMPHOCYTES NFR BLD AUTO: 31 %
MCH RBC QN AUTO: 29.9 PG (ref 26.6–33)
MCHC RBC AUTO-ENTMCNC: 34 G/DL (ref 31.5–35.7)
MCV RBC AUTO: 88 FL (ref 79–97)
MONOCYTES # BLD AUTO: 0.4 X10E3/UL (ref 0.1–0.9)
MONOCYTES NFR BLD AUTO: 5 %
NEUTROPHILS # BLD AUTO: 4.9 X10E3/UL (ref 1.4–7)
NEUTROPHILS NFR BLD AUTO: 63 %
PLATELET # BLD AUTO: 348 X10E3/UL (ref 150–450)
RBC # BLD AUTO: 4.45 X10E6/UL (ref 3.77–5.28)
RH BLD: POSITIVE
RPR SER QL: NON REACTIVE
RUBV IGG SERPL IA-ACNC: 1.99 INDEX
TSH SERPL DL<=0.005 MIU/L-ACNC: 0.32 UIU/ML (ref 0.45–4.5)
WBC # BLD AUTO: 7.8 X10E3/UL (ref 3.4–10.8)

## 2022-07-27 LAB
BACTERIA UR CULT: NORMAL
BACTERIA UR CULT: NORMAL
C TRACH RRNA CVX QL NAA+PROBE: NEGATIVE
CYTOLOGIST CVX/VAG CYTO: NORMAL
CYTOLOGY CVX/VAG DOC CYTO: NORMAL
CYTOLOGY CVX/VAG DOC THIN PREP: NORMAL
DX ICD CODE: NORMAL
HIV 1 & 2 AB SER-IMP: NORMAL
HPV I/H RISK 4 DNA CVX QL PROBE+SIG AMP: NEGATIVE
N GONORRHOEA RRNA CVX QL NAA+PROBE: NEGATIVE
OTHER STN SPEC: NORMAL
STAT OF ADQ CVX/VAG CYTO-IMP: NORMAL

## 2022-07-28 LAB — T4 FREE SERPL-MCNC: 1.24 NG/DL (ref 0.82–1.77)

## 2022-08-16 ENCOUNTER — INITIAL PRENATAL (OUTPATIENT)
Dept: OBSTETRICS AND GYNECOLOGY | Age: 37
End: 2022-08-16

## 2022-08-16 VITALS — BODY MASS INDEX: 36.6 KG/M2 | WEIGHT: 213.2 LBS | SYSTOLIC BLOOD PRESSURE: 122 MMHG | DIASTOLIC BLOOD PRESSURE: 72 MMHG

## 2022-08-16 DIAGNOSIS — Z3A.12 12 WEEKS GESTATION OF PREGNANCY: Primary | ICD-10-CM

## 2022-08-16 DIAGNOSIS — O09.521 MULTIGRAVIDA OF ADVANCED MATERNAL AGE IN FIRST TRIMESTER: ICD-10-CM

## 2022-08-16 DIAGNOSIS — R73.09 ELEVATED HEMOGLOBIN A1C: ICD-10-CM

## 2022-08-16 PROBLEM — Z86.32 HISTORY OF GESTATIONAL DIABETES: Status: ACTIVE | Noted: 2022-08-16

## 2022-08-16 PROBLEM — O24.419 GESTATIONAL DIABETES: Status: RESOLVED | Noted: 2020-12-26 | Resolved: 2022-08-16

## 2022-08-16 LAB
EXTERNAL CYSTIC FIBROSIS: NORMAL
EXTERNAL NIPT: NORMAL
GLUCOSE UR STRIP-MCNC: NEGATIVE MG/DL
PROT UR STRIP-MCNC: ABNORMAL MG/DL
VZV IGG SER QL: NORMAL

## 2022-08-16 PROCEDURE — 0501F PRENATAL FLOW SHEET: CPT | Performed by: OBSTETRICS & GYNECOLOGY

## 2022-08-16 RX ORDER — CHOLECALCIFEROL (VITAMIN D3) 25 MCG
1 TABLET,CHEWABLE ORAL DAILY
Qty: 60 CAPSULE | Refills: 11 | Status: SHIPPED | OUTPATIENT
Start: 2022-08-16

## 2022-08-16 NOTE — PROGRESS NOTES
Pt presents for OB intake. She denies vag bleeding or pain. She has been monitoring glucose values and fasting are elevated 92 to 111, pp values are 78 to 165. Most are in range    A/p: AMA: pt declines aneuploidy screening. Plan targeted u/s with MFM. Recommend baby asa through 36 weeks.     DM: labs suggest borderline DM, referred to MFM and diabetes educator for insulin recommendations. F/u 2 weeks to review log.     Low TSH: normal free t4, repeat tsh at f/u    12 weeks: declines carrier testing, reviewed pnguidelines and activity restrictions.

## 2022-08-19 ENCOUNTER — TELEPHONE (OUTPATIENT)
Dept: DIABETES SERVICES | Facility: HOSPITAL | Age: 37
End: 2022-08-19

## 2022-08-19 NOTE — TELEPHONE ENCOUNTER
Pt returned our phone call re scheduling ed. She states she is going out of town tomorrow and returning the 28th. Agree w/pt she will call us the 29th and tentatively come in for review/ed after her scheduled MD appt here at [facility] that day.

## 2022-08-25 ENCOUNTER — APPOINTMENT (OUTPATIENT)
Dept: ULTRASOUND IMAGING | Facility: HOSPITAL | Age: 37
End: 2022-08-25

## 2022-08-26 ENCOUNTER — TELEPHONE (OUTPATIENT)
Dept: OBSTETRICS AND GYNECOLOGY | Facility: CLINIC | Age: 37
End: 2022-08-26

## 2022-08-26 NOTE — TELEPHONE ENCOUNTER
Call made to patient to discuss MFM management of diabetes.  Discussed new MFM glucose logs and the time slots for checking blood sugars. Reviewed fasting, pre prandial, 1 hour after the start of breakfast/lunch/dinner and before bedtime snack/HS times with patient. Questions answered at this time. Will send patient a [employer] message with the glucose log form attached. Encouraged pt to call with any questions or concerns before her appointment. Next MFM appointment scheduled for 8/29.   Pt reports that the order has been placed for her to see the diabetic educator. Pt currently on vacation and will call them on Monday morning.

## 2022-08-28 NOTE — PROGRESS NOTES
MATERNAL FETAL MEDICINE ConsultNote    Dear Dr Fide Shelton MD:    Thank you for your kind referral of Ynes Flores.  As you know, she is a 36 y.o.   at 14 4 /7 weeks gestation (Estimated Date of Delivery: 23). This is a Consult.      Her antepartum course is complicated by:  AMA  Elevated A1c in the pre-diabetes range  Obese BMI 36    Aneuploidy Screening: declined    HPI: Today, she denies headache, blurry vision, RUQ pain. No vaginal bleeding, no contractions.     Review of History:  Past Medical History:   Diagnosis Date   • Diabetes mellitus (HCC)    • Gestational diabetes    • Placenta previa 2020    Resolved this pregnancy   • Vaginal delivery      Past Surgical History:   Procedure Laterality Date   • WISDOM TOOTH EXTRACTION         Social History     Socioeconomic History   • Marital status:    Tobacco Use   • Smoking status: Never Smoker   • Smokeless tobacco: Never Used   Vaping Use   • Vaping Use: Never used   Substance and Sexual Activity   • Alcohol use: No   • Drug use: No   • Sexual activity: Yes     Partners: Male     Birth control/protection: None     Family History   Problem Relation Age of Onset   • Thyroid cancer Mother    • Diabetes Mother    • Colon cancer Maternal Uncle    • Skin cancer Maternal Grandmother    • Ovarian cancer Grandchild    • Ovarian cancer Grandchild    • Birth defects Neg Hx       Allergies   Allergen Reactions   • Amoxicillin Hives   • Cefdinir Hives   • Penicillins Hives      Current Outpatient Medications on File Prior to Visit   Medication Sig Dispense Refill   • aspirin 81 MG EC tablet Take 81 mg by mouth Daily.     • glucose blood test strip Use as instructed to test 4 times daily 120 each 12   • Lancets 30G misc 1 Device 4 (Four) Times a Day. 120 each 11   • Prenatal Vit-Fe Sulfate-FA-DHA (Prenatal Vitamin/Min +DHA) 27-0.8-200 MG capsule Take 1 tablet by mouth Daily. And 1 capsule daily 60 capsule 11   • multivitamin with minerals  "tablet tablet Take 1 tablet by mouth Daily.       No current facility-administered medications on file prior to visit.        Past obstetric, gynecological, medical, surgical, family and social history reviewed.  Relevant lab work and imaging reviewed.    Review of systems  Constitutional:  denies fever, chills, malaise.   ENT/Mouth:  denies sore throat, tinnitis  Eyes: denies vision changes/pain  CV:  denies chest pain  Respiratory:  denies cough/SOB  GI:  denies N/V, diarrhea, abdominal pain.    :   denies dysuria  Skin:  denies lesions or pruritis   Neuro:  denies weakness, focal neurologic symptoms    Vitals:    22 1340   BP: 121/69   BP Location: Right arm   Patient Position: Sitting   Pulse: 94   Temp: 98.4 °F (36.9 °C)   TempSrc: Temporal   Weight: 97.1 kg (214 lb)   Height: 162.6 cm (64\")       PHYSICAL EXAM   GENERAL: Not in acute distress, AAOx3, pleasant  CARDIO: regular rate and rhythm  PULM: symmetric chest rise, speaking in complete sentences without difficulty  NEURO: awake, alert and oriented to person, place, and time  ABDOMINAL: No fundal tenderness, no rebound or guarding, gravid  EXTREMITIES: no bilateral lower extremity edema/tenderness  SKIN: Warm, well-perfused      ULTRASOUND   Please view full ultrasound note on Imaging tab in ViewPoint.      ASSESSMENT/COUNSELIN y.o. G  P  at   /7 weeks gestation (Estimated Date of Delivery: 23)    -Pregnancy  [ X ] stable  [   ] improving [  ] worsening    Gestational diabetes  I explained to her that patients with gestational diabetes may be controlled with diet and exercise alone or may require medication. We discussed at length the increased risks of macrosomia, polyhydramnios, and stillbirth with uncontrolled glucose.     Briefly, I have recommended that she have 30 grams of carbohydrates with breakfast, and 45 grams with lunch and dinner. In addition, she should have three snacks in between meals with 15 grams of carbohydrates, and " at bedtime with 15-20g of carbs. I have instructed her on checking her glucose before and 1hour after each meal. I recommend keeping her pre-prandial values below 90 and post-prandial values below 120. She has been provided a glucometer and prescription for lancets and test strips.    Should she require medication for hyperglycemia, I explained that I recommend twice weekly testing, alternating NST with BPP starting at 32 weeks and I recommend delivery at 39 weeks unless other complications indicate an earlier delivery. If she remains controlled with diet alone, it is reasonable to await spontaneous labor. In addition, you may consider an ultrasound for fetal growth at 38 weeks.     She has a pre-diabetes level of A1c and has a hx of gestational DM.  Her logs do indicate a level of insulin resistance and she meets criteria for GDM without a 1 hour test at this time.  Agree with Dr. Shelton to manager her as a GDM given these findings.  She will send in her logs weekly and we will decide what to do as far as medication.  Her questions were answered.        Diagnoses and all orders for this visit:    1. Obesity in pregnancy, antepartum (Primary)    2. Elevated hemoglobin A1c    3. Multigravida of advanced maternal age in first trimester           Advanced Maternal Age  [ X ] stable  [   ] improving [  ] worsening    The patient's age related risk for aneuploidy was reviewed. Noninvasive prenatal screening with cell-free fetal DNA (NIPT) results reviewed.  Differences between genetic screening with NIPT and invasive diagnostic testing with amniocentesis in the second trimester were reviewed.  Patient declines genetic screening.      Maternal age greater than 40 has been associated with placental insufficiency with increased risk of fetal growth disorder, stillbirth, hypertensive disorders. Recommend fetal growth surveillance.         Summary of Plan  -Serial growth ultrasounds every 4 weeks at [facility] office  -Starting at 28  - 32 weeks: Weekly fetal  surveillance until delivery, potentially twice weekly depending on control  -Will send in sugars Thursday or Friday and decide if needs medication.  Log and meal logging reviewed.      Follow-up: 4 weeks Anatomy.  Will send sugars in meantime.      Thank you for the consult and opportunity to care for this patient.  Please feel free to reach out with any questions or concerns.      I spent 30 minutes caring for this patient on this date of service. This time includes time spent by me in the following activities: preparing for the visit, reviewing tests, obtaining and/or reviewing a separately obtained history, performing a medically appropriate examination and/or evaluation, counseling and educating the patient/family/caregiver and independently interpreting results and communicating that information with the patient/family/caregiver with greater than 50% spent in counseling and coordination of care.     Nohelia Magana MD FACOG  Maternal Fetal Medicine-[facility]  Office: [phone]  [email or web address]

## 2022-08-29 ENCOUNTER — HOSPITAL ENCOUNTER (OUTPATIENT)
Dept: ULTRASOUND IMAGING | Facility: HOSPITAL | Age: 37
Discharge: HOME | End: 2022-08-29
Admitting: OBSTETRICS & GYNECOLOGY

## 2022-08-29 ENCOUNTER — TRANSCRIBE ORDERS (OUTPATIENT)
Dept: ULTRASOUND IMAGING | Facility: HOSPITAL | Age: 37
End: 2022-08-29

## 2022-08-29 ENCOUNTER — OFFICE VISIT (OUTPATIENT)
Dept: OBSTETRICS AND GYNECOLOGY | Facility: CLINIC | Age: 37
End: 2022-08-29

## 2022-08-29 VITALS
TEMPERATURE: 98.4 F | SYSTOLIC BLOOD PRESSURE: 121 MMHG | WEIGHT: 214 LBS | DIASTOLIC BLOOD PRESSURE: 69 MMHG | BODY MASS INDEX: 36.54 KG/M2 | HEIGHT: 64 IN | HEART RATE: 94 BPM

## 2022-08-29 DIAGNOSIS — O99.210 OBESITY IN PREGNANCY, ANTEPARTUM: Primary | ICD-10-CM

## 2022-08-29 DIAGNOSIS — O09.521 MULTIGRAVIDA OF ADVANCED MATERNAL AGE IN FIRST TRIMESTER: ICD-10-CM

## 2022-08-29 DIAGNOSIS — O24.410 DIET CONTROLLED GESTATIONAL DIABETES MELLITUS (GDM), ANTEPARTUM: Primary | ICD-10-CM

## 2022-08-29 DIAGNOSIS — R73.09 ELEVATED HEMOGLOBIN A1C: ICD-10-CM

## 2022-08-29 DIAGNOSIS — O24.410 DIET CONTROLLED GESTATIONAL DIABETES MELLITUS (GDM), ANTEPARTUM: ICD-10-CM

## 2022-08-29 PROCEDURE — 76805 OB US >/= 14 WKS SNGL FETUS: CPT

## 2022-08-29 PROCEDURE — 99214 OFFICE O/P EST MOD 30 MIN: CPT | Performed by: OBSTETRICS & GYNECOLOGY

## 2022-08-29 PROCEDURE — 76815 OB US LIMITED FETUS(S): CPT

## 2022-08-29 PROCEDURE — 76815 OB US LIMITED FETUS(S): CPT | Performed by: OBSTETRICS & GYNECOLOGY

## 2022-08-29 RX ORDER — ASPIRIN 81 MG/1
81 TABLET ORAL DAILY
COMMUNITY
End: 2023-01-24

## 2022-08-29 NOTE — PROGRESS NOTES
Pt. Reports that she is doing well and denies vaginal bleeding, cramping, contractions or leaking of fluid at this time. Denies headache, visual changes or epigastric pain.  Denies any additional complaints at time of appointment.  Next OB appointment scheduled for 8/31/2022.      Glucose logs on chart for Dr. Magana to review.  Folder provider to pt. With MFM glucose logs.  Pt. Reports appointment with diabetic educator on 8/31/2022    Vitals:    08/29/22 1340   BP: 121/69   Pulse: 94   Temp: 98.4 °F (36.9 °C)

## 2022-08-31 ENCOUNTER — HOSPITAL ENCOUNTER (OUTPATIENT)
Dept: DIABETES SERVICES | Facility: HOSPITAL | Age: 37
Discharge: HOME | End: 2022-08-31
Admitting: OBSTETRICS & GYNECOLOGY

## 2022-08-31 ENCOUNTER — ROUTINE PRENATAL (OUTPATIENT)
Dept: OBSTETRICS AND GYNECOLOGY | Age: 37
End: 2022-08-31

## 2022-08-31 VITALS — DIASTOLIC BLOOD PRESSURE: 78 MMHG | BODY MASS INDEX: 36.29 KG/M2 | WEIGHT: 211.4 LBS | SYSTOLIC BLOOD PRESSURE: 122 MMHG

## 2022-08-31 DIAGNOSIS — Z3A.15 15 WEEKS GESTATION OF PREGNANCY: Primary | ICD-10-CM

## 2022-08-31 DIAGNOSIS — R79.89 LOW TSH LEVEL: ICD-10-CM

## 2022-08-31 LAB
GLUCOSE UR STRIP-MCNC: NEGATIVE MG/DL
PROT UR STRIP-MCNC: NEGATIVE MG/DL

## 2022-08-31 PROCEDURE — G0108 DIAB MANAGE TRN  PER INDIV: HCPCS

## 2022-08-31 PROCEDURE — 0502F SUBSEQUENT PRENATAL CARE: CPT | Performed by: OBSTETRICS & GYNECOLOGY

## 2022-08-31 RX ORDER — MAGNESIUM HYDROXIDE 400 MG/5ML
1 SUSPENSION, ORAL (FINAL DOSE FORM) ORAL DAILY
COMMUNITY
Start: 2022-08-16 | End: 2023-01-04 | Stop reason: SDUPTHER

## 2022-08-31 NOTE — PROGRESS NOTES
Pt presents for routine visit. She saw [facility] and is now monitoring pre and 1 hour pp values for glucose. She denies any issues today    GDM: reviewed glucose log. Fasting values are variable 88 to 114. 2 hour pp are rarely over 120 but one hour values recommended by MFM are consistently increased after breakfast and lunch. Pt will fax values to [facility] tomorrow for review. Plan f/u here 2 weeks or prn.     AMA: pt declines aneuploidy or afp 4 testing, plan targeted u/s    Low TSH : likely pregnancy related, repeat tsh and free t4 today

## 2022-09-01 LAB
T4 FREE SERPL-MCNC: 0.95 NG/DL (ref 0.93–1.7)
TSH SERPL DL<=0.005 MIU/L-ACNC: 0.41 UIU/ML (ref 0.27–4.2)

## 2022-09-01 NOTE — PROGRESS NOTES
[facility]   Diabetes Management       Date:  2022    Patient:  Ynes Flores     MRN:  [id number]    Gestational age:  15w1d       Diagnosis:  Diabetes during pregnancy    Management Role: N/A - patient's blood glucose is currently diet controlled.      Summary   Mrs. Flores met with CRISTY ZAMORA for MNT for diagnosis of diabetes during pregnancy. Consistent with the ADA’s standards of care, a comprehensive assessment/training record has been sent to medical records (see media tab) to associate with this encounter.    Hemoglobin A1c (22) = 6.0% - patient reports that she had been following her PCP for prediabetes prior to this pregnancy.     Patient reports that she had diabetes in her previous pregnancy in  - states she was on insulin during that pregnancy.     Patient brought blood glucose logs to today’s visit. Reviewed readings with patient. Patient’s fasting blood glucose readings range from 88 to 114 mg/dl. With 77% of these fasting blood glucose readings above 90 mg/dl. We discussed how hormones from pregnancy can increase her blood sugars. Patient verbalized understanding.     Patient’s two hour post prandial readings are less than 120 mg/dl - except for three readings of 127, 140, and 175 mg/dl after eating too many carbs or having ice cream. We discussed the importance of limiting her carbs and avoiding concentrated carbohydrates.     Patient reports that she recently started testing her blood glucose one hour after eating. Of these 1 hour post prandial readings 60% are above 120 mg/dl.    We discussed when to test her blood glucose - she was advised to test her blood glucose fasting, before meals and one hour after meals. We reviewed the recommended goals per MFM. We discussed the importance of blood glucose control during pregnancy. We reviewed the signs of symptoms of hypo/hyperglycemia and the proper treatment. Patient verbalized understanding.    Patient was instructed on the  importance of diet adherence and limiting her carb intake for optimal blood glucose control. She was advised to avoid concentrated carbohydrates and sweetened beverages for blood glucose control during pregnancy. Patient was instructed on carb counting - we reviewed reading food labels and meal planning.     Patient was instructed to consume 30 grams of carbohydrates with breakfast, and 45 grams with lunch and dinner. She was instructed to consume 15 grams of carbohydrates at snacks - she was advised for a bedtime snack she should consume 15-20g of carbs. Patient verbalized understanding.     Patient stated that was instructed to send in her blood glucose readings to [facility] tomorrow (9/1/22). Patient was advised to send in readings as instructed - we discussed the importance of sending her blood glucose to [facility] as instructed throughout her pregnancy.  We again discussed the importance of blood glucose control throughout pregnancy. Patient verbalized understanding of all the information we discussed at today’s visit.     Mrs. Flores has been encouraged to call our office with questions or additional education needs. Please place referral for additional services or follow-up should need arise.     Thank you for the referral     Rika Courtney RD, LD, CDCES      Referring provider:  Fide Shelton Md  [address] [place] [address]      Rika Courtney RD  9/1/2022  08:11 EDT

## 2022-09-02 ENCOUNTER — TELEPHONE (OUTPATIENT)
Dept: OBSTETRICS AND GYNECOLOGY | Facility: CLINIC | Age: 37
End: 2022-09-02

## 2022-09-02 RX ORDER — SYRINGE-NEEDLE,INSULIN,0.5 ML 27GX1/2"
SYRINGE, EMPTY DISPOSABLE MISCELLANEOUS
Qty: 60 EACH | Refills: 5 | Status: SHIPPED | OUTPATIENT
Start: 2022-09-02 | End: 2022-10-18 | Stop reason: SDUPTHER

## 2022-09-02 RX ORDER — INSULIN HUMAN 100 [IU]/ML
50 INJECTION, SUSPENSION SUBCUTANEOUS
Qty: 20 ML | Refills: 12 | Status: SHIPPED | OUTPATIENT
Start: 2022-09-02 | End: 2023-02-17 | Stop reason: HOSPADM

## 2022-09-02 NOTE — TELEPHONE ENCOUNTER
Pt called office back. Dr. Magana reviewed glucose logs and the following are the adjustments to make:    Starting NPH 15 units AM and 10 units HS    Pt notes that she was on insulin in her previous pregnancy. Discussed with patient that the order for NPH, insulin syringes/needles and glucose tabs have been sent to pharmacy. Pt denies any questions with starting insulin at this time. Discussed with patient that she needs to notify office with consistent blood sugars >180 and <60. Pt verbalized understanding. Will have pt send in glucose logs for evaluation on Tuesday, 9/6.   In addition we reviewed 30 grams of carbohydrates with breakfast, and 45 grams with lunch and dinner. Also having three snacks in between meals with 15 grams of carbohydrates, and at bedtime with 15-20g of carb. Pt notes she doesn't always eat a bedtime snack but will try to. Will send patient a Demand[employer] message reviewing all details discussed over the phone.

## 2022-09-16 ENCOUNTER — ROUTINE PRENATAL (OUTPATIENT)
Dept: OBSTETRICS AND GYNECOLOGY | Age: 37
End: 2022-09-16

## 2022-09-16 VITALS — WEIGHT: 210.4 LBS | BODY MASS INDEX: 36.12 KG/M2 | SYSTOLIC BLOOD PRESSURE: 122 MMHG | DIASTOLIC BLOOD PRESSURE: 70 MMHG

## 2022-09-16 DIAGNOSIS — O09.521 MULTIGRAVIDA OF ADVANCED MATERNAL AGE IN FIRST TRIMESTER: ICD-10-CM

## 2022-09-16 DIAGNOSIS — Z3A.17 17 WEEKS GESTATION OF PREGNANCY: Primary | ICD-10-CM

## 2022-09-16 PROBLEM — Z86.32 HISTORY OF GESTATIONAL DIABETES: Status: RESOLVED | Noted: 2022-08-16 | Resolved: 2022-09-16

## 2022-09-16 PROBLEM — O24.414 INSULIN CONTROLLED GESTATIONAL DIABETES MELLITUS (GDM) IN SECOND TRIMESTER: Status: ACTIVE | Noted: 2022-09-16

## 2022-09-16 LAB
GLUCOSE UR STRIP-MCNC: NEGATIVE MG/DL
PROT UR STRIP-MCNC: NEGATIVE MG/DL

## 2022-09-16 PROCEDURE — 0502F SUBSEQUENT PRENATAL CARE: CPT | Performed by: OBSTETRICS & GYNECOLOGY

## 2022-09-16 NOTE — PROGRESS NOTES
Pt comes in for routine visit. No issues today. She notes she has been titrating insulin with MFM guidance  Started NPH insulin taking 17 U NPH in AM and 12 U NPH in pm  Glucose log reviewed: in last week, all fasting are normal. Pp values are overall good as pt is doing 1 hour values per MFM recommendations and highest is 146    A/p: DM: overall good glucose control with MFM surveillance, she has targeted u/s there, f/u 3 weeks or prn    AMA: has targeted u/s with MFM, declined aneuploidy testing, taking baby asa daily    Hx IUGR: plan growth surveillance

## 2022-09-20 ENCOUNTER — DOCUMENTATION (OUTPATIENT)
Dept: OBSTETRICS AND GYNECOLOGY | Facility: CLINIC | Age: 37
End: 2022-09-20

## 2022-09-20 ENCOUNTER — TRANSCRIBE ORDERS (OUTPATIENT)
Dept: ULTRASOUND IMAGING | Facility: HOSPITAL | Age: 37
End: 2022-09-20

## 2022-09-20 DIAGNOSIS — O24.410 DIET CONTROLLED GESTATIONAL DIABETES MELLITUS (GDM), ANTEPARTUM: Primary | ICD-10-CM

## 2022-09-20 NOTE — PROGRESS NOTES
Pt sent in glucose logs for evaluation by Dr. Magana. The following adjustments were made:    Increase NPH insulin to 19 units AM.   Continue with NPH 12 units HS.     Pt aware of adjustments. Next MFM appointment scheduled for 9/26.

## 2022-09-26 ENCOUNTER — HOSPITAL ENCOUNTER (OUTPATIENT)
Dept: ULTRASOUND IMAGING | Facility: HOSPITAL | Age: 37
Discharge: HOME | End: 2022-09-26
Admitting: OBSTETRICS & GYNECOLOGY

## 2022-09-26 ENCOUNTER — TRANSCRIBE ORDERS (OUTPATIENT)
Dept: ULTRASOUND IMAGING | Facility: HOSPITAL | Age: 37
End: 2022-09-26

## 2022-09-26 ENCOUNTER — OFFICE VISIT (OUTPATIENT)
Dept: OBSTETRICS AND GYNECOLOGY | Facility: CLINIC | Age: 37
End: 2022-09-26

## 2022-09-26 VITALS
WEIGHT: 211.8 LBS | TEMPERATURE: 98 F | HEART RATE: 94 BPM | BODY MASS INDEX: 36.36 KG/M2 | SYSTOLIC BLOOD PRESSURE: 112 MMHG | DIASTOLIC BLOOD PRESSURE: 60 MMHG

## 2022-09-26 DIAGNOSIS — O24.414 INSULIN CONTROLLED GESTATIONAL DIABETES MELLITUS (GDM) DURING PREGNANCY, ANTEPARTUM: Primary | ICD-10-CM

## 2022-09-26 DIAGNOSIS — O24.410 DIET CONTROLLED GESTATIONAL DIABETES MELLITUS (GDM), ANTEPARTUM: Primary | ICD-10-CM

## 2022-09-26 DIAGNOSIS — O24.410 DIET CONTROLLED GESTATIONAL DIABETES MELLITUS (GDM), ANTEPARTUM: ICD-10-CM

## 2022-09-26 PROCEDURE — 76811 OB US DETAILED SNGL FETUS: CPT

## 2022-09-26 PROCEDURE — 76817 TRANSVAGINAL US OBSTETRIC: CPT

## 2022-09-26 PROCEDURE — 76817 TRANSVAGINAL US OBSTETRIC: CPT | Performed by: OBSTETRICS & GYNECOLOGY

## 2022-09-26 PROCEDURE — 99214 OFFICE O/P EST MOD 30 MIN: CPT | Performed by: OBSTETRICS & GYNECOLOGY

## 2022-09-26 PROCEDURE — 76811 OB US DETAILED SNGL FETUS: CPT | Performed by: OBSTETRICS & GYNECOLOGY

## 2022-09-26 RX ORDER — CETIRIZINE HYDROCHLORIDE 10 MG/1
10 TABLET ORAL DAILY
COMMUNITY
End: 2023-02-17 | Stop reason: HOSPADM

## 2022-09-26 NOTE — PROGRESS NOTES
MATERNAL FETAL MEDICINE Follow up Note    Dear Dr Fide Shelton MD:    Thank you for your kind referral of Ynes Flores.  As you know, she is a 36 y.o.   at 18 5 /7 weeks gestation (Estimated Date of Delivery: 23). This is a Follow up.    Her antepartum course is complicated by:  AMA  Elevated A1c in the pre-diabetes range  Obese BMI 36    Aneuploidy Screening: declined    HPI: Today, she denies headache, blurry vision, RUQ pain. No vaginal bleeding, no contractions.     Review of History:  Past Medical History:   Diagnosis Date   • Diabetes mellitus (HCC)    • Gestational diabetes    • Placenta previa 2020    Resolved this pregnancy   • Vaginal delivery      Past Surgical History:   Procedure Laterality Date   • WISDOM TOOTH EXTRACTION         Social History     Socioeconomic History   • Marital status:    Tobacco Use   • Smoking status: Never Smoker   • Smokeless tobacco: Never Used   Vaping Use   • Vaping Use: Never used   Substance and Sexual Activity   • Alcohol use: No   • Drug use: No   • Sexual activity: Yes     Partners: Male     Birth control/protection: None     Family History   Problem Relation Age of Onset   • Thyroid cancer Mother    • Diabetes Mother    • Colon cancer Maternal Uncle    • Skin cancer Maternal Grandmother    • Ovarian cancer Grandchild    • Ovarian cancer Grandchild    • Birth defects Neg Hx       Allergies   Allergen Reactions   • Amoxicillin Hives   • Cefdinir Hives   • Penicillins Hives      Current Outpatient Medications on File Prior to Visit   Medication Sig Dispense Refill   • aspirin 81 MG EC tablet Take 81 mg by mouth Daily.     • cetirizine (zyrTEC) 10 MG tablet Take 10 mg by mouth Daily.     • glucose 4-6 GM-MG per chewable tablet      • glucose blood test strip Use as instructed 250 each 12   • glucose blood test strip Use as instructed to test 4 times daily 120 each 12   • insulin NPH (HumuLIN N) 100 UNIT/ML injection Inject 50 Units under  "the skin into the appropriate area as directed 2 (Two) Times a Day Before Meals. Take 15 u in the AM and 10 in PM before bed, variable dose up to 50u--to be adjusted by physician on an ongoing basis. 20 mL 12   • Insulin Syringe-Needle U-100 31G X 5/16\" 1 ML misc Use as directed with NPH insulin 60 each 5   • Lancets 30G misc 1 Device 4 (Four) Times a Day. 120 each 11   • Nutritional Supplements (Glucose Management) tablet Take 4 g by mouth As Needed (hypoglycemia sugar <60). 50 tablet 2   • Prenatal Vit-Fe Sulfate-FA-DHA (Prenatal Vitamin/Min +DHA) 27-0.8-200 MG capsule Take 1 tablet by mouth Daily. And 1 capsule daily 60 capsule 11   • Prenatal MV-Min-Fe Fum-FA-DHA (Prenatal Multi +DHA) 27-0.8-250 MG capsule Take 1 capsule by mouth Daily.       No current facility-administered medications on file prior to visit.        Past obstetric, gynecological, medical, surgical, family and social history reviewed.  Relevant lab work and imaging reviewed.    Review of systems  Constitutional:  denies fever, chills, malaise.   ENT/Mouth:  denies sore throat, tinnitis  Eyes: denies vision changes/pain  CV:  denies chest pain  Respiratory:  denies cough/SOB  GI:  denies N/V, diarrhea, abdominal pain.    :   denies dysuria  Skin:  denies lesions or pruritis   Neuro:  denies weakness, focal neurologic symptoms    Vitals:    09/26/22 1418   BP: 112/60   BP Location: Right arm   Patient Position: Sitting   Pulse: 94   Temp: 98 °F (36.7 °C)   TempSrc: Temporal   Weight: 96.1 kg (211 lb 12.8 oz)       PHYSICAL EXAM   GENERAL: Not in acute distress, AAOx3, pleasant  CARDIO: regular rate and rhythm  PULM: symmetric chest rise, speaking in complete sentences without difficulty  NEURO: awake, alert and oriented to person, place, and time  ABDOMINAL: No fundal tenderness, no rebound or guarding, gravid  EXTREMITIES: no bilateral lower extremity edema/tenderness  SKIN: Warm, well-perfused      ULTRASOUND   Please view full ultrasound note " on Imaging tab in ViewPoint.  Variable presentation  Posterior placenta with normal cord insertion.  Normal fetal anatomy.  EFW 230g (AC 35%)   MVP 3.3 cm  CL 3.55 cm, which is normal.    ASSESSMENT/COUNSELIN y.o.   at 18 5 /7 weeks gestation (Estimated Date of Delivery: 23).    -Pregnancy  [ X ] stable  [   ] improving [  ] worsening    Gestational diabetes    Briefly, I have recommended that she have 30 grams of carbohydrates with breakfast, and 45 grams with lunch and dinner. In addition, she should have three snacks in between meals with 15 grams of carbohydrates, and at bedtime with 15-20g of carbs. I have instructed her on checking her glucose before and 1hour after each meal. I recommend keeping her pre-prandial values below 90 and post-prandial values below 120.     She has been doing very well.  We tweaked her insulin regimen today, but overall her numbers are very good and she was congratulated.  She feels like overall meals are going well and she is doing the carbs and snacks effectively.  Fasting 80-low 100s, preprandials 80-low 100's, mostly 90's for both.  Postprandials within range with occassional 130's.  She is working on correcting this with food.      Increased NPH from -->.      Diagnoses and all orders for this visit:    1. Insulin controlled gestational diabetes mellitus (GDM) during pregnancy, antepartum (Primary)      Advanced Maternal Age  [ X ] stable  [   ] improving [  ] worsening    Patient declined genetic screening previously.  She has a normal anatomy.      Maternal age greater than 35  has been associated with placental insufficiency with increased risk of fetal growth disorder, stillbirth, hypertensive disorders. Recommend fetal growth surveillance.     Summary of Plan  -Serial growth ultrasounds every 4 weeks at [facility] office  -Starting at 32 weeks: Weekly fetal  surveillance until delivery  -Will send in sugars weekly as she has been  well-controlled.   -Will see again at 24 weeks for growth    Follow-up: 5 weeks for growth.      Thank you for the consult and opportunity to care for this patient.  Please feel free to reach out with any questions or concerns.      I spent 30 minutes caring for this patient on this date of service. This time includes time spent by me in the following activities: preparing for the visit, reviewing tests, obtaining and/or reviewing a separately obtained history, performing a medically appropriate examination and/or evaluation, counseling and educating the patient/family/caregiver and independently interpreting results and communicating that information with the patient/family/caregiver with greater than 50% spent in counseling and coordination of care.     Nohelia Magana MD [facility]  Maternal Fetal Medicine-[facility]  Office: [phone]  [email or web address]

## 2022-09-26 NOTE — PROGRESS NOTES
Pt reports that she is doing well and denies vaginal bleeding, cramping, contractions or LOF at this time. Notes occasional abdominal cramping. Reports active fetal movement. Denies HA, visual changes or epigastric pain. Denies any additional complaints at time of appointment. Next OB appointment scheduled for 10/5.  Glucose logs on chart for Dr. Magana to review.  Vitals:    09/26/22 1418   BP: 112/60   Pulse: 94   Temp: 98 °F (36.7 °C)

## 2022-10-04 ENCOUNTER — DOCUMENTATION (OUTPATIENT)
Dept: OBSTETRICS AND GYNECOLOGY | Facility: CLINIC | Age: 37
End: 2022-10-04

## 2022-10-04 NOTE — PROGRESS NOTES
Ynes sent in glucose logs for evaluation by Dr. Magana. The following changes were made:    NPH 24/16.    Discussed adjustments with patient. Will send in logs again next Tuesday, 10/11. Next MFM appointment scheduled for 10/31.

## 2022-10-05 ENCOUNTER — ROUTINE PRENATAL (OUTPATIENT)
Dept: OBSTETRICS AND GYNECOLOGY | Age: 37
End: 2022-10-05

## 2022-10-05 VITALS — WEIGHT: 209.6 LBS | DIASTOLIC BLOOD PRESSURE: 60 MMHG | SYSTOLIC BLOOD PRESSURE: 120 MMHG | BODY MASS INDEX: 35.98 KG/M2

## 2022-10-05 DIAGNOSIS — Z3A.20 20 WEEKS GESTATION OF PREGNANCY: Primary | ICD-10-CM

## 2022-10-05 LAB
GLUCOSE UR STRIP-MCNC: NEGATIVE MG/DL
PROT UR STRIP-MCNC: NEGATIVE MG/DL

## 2022-10-05 PROCEDURE — 0502F SUBSEQUENT PRENATAL CARE: CPT | Performed by: OBSTETRICS & GYNECOLOGY

## 2022-10-05 NOTE — PROGRESS NOTES
Pt presents for routine visit. She denies any issues today. She notes [facility] has increased her insulin to NPH 24 AM and 16 PM.     A/p: 20 weeks: pt had normal targeted u/s with [facility], she denies any issues today. Recommend flu shot, pt declines, f/u 3 weeks or prn    Discussed contraception. She and spouse are considering vas. Discussed option of tubal if she has c/s. She declines signing for this at this point but will consider.     DM: glucose log reviewed and good control overall. [facility] is managing insulin. They will do growth scans. Plan BPP 32 + weeks and pt agrees. Pt is taking baby asa    AMA: pt declined aneuploidy screening. Normal targeted u/s noted.

## 2022-10-11 ENCOUNTER — DOCUMENTATION (OUTPATIENT)
Dept: OBSTETRICS AND GYNECOLOGY | Facility: CLINIC | Age: 37
End: 2022-10-11

## 2022-10-11 NOTE — PROGRESS NOTES
Ynes sent in glucose logs for evaluation by Dr. Magana. The following changes were made:    NPH 26/18    Adjustments reviewed with Ynes. Pt to send in glucose logs again on Tuesday, 10/18, for evaluation. Next MFM appointment scheduled for 10/31. Pt encouraged to call with any questions or concerns.

## 2022-10-18 RX ORDER — SYRINGE-NEEDLE,INSULIN,0.5 ML 27GX1/2"
SYRINGE, EMPTY DISPOSABLE MISCELLANEOUS
Qty: 300 EACH | Refills: 5 | Status: SHIPPED | OUTPATIENT
Start: 2022-10-18 | End: 2023-03-29

## 2022-10-25 ENCOUNTER — DOCUMENTATION (OUTPATIENT)
Dept: OBSTETRICS AND GYNECOLOGY | Facility: CLINIC | Age: 37
End: 2022-10-25

## 2022-10-25 NOTE — PROGRESS NOTES
Ynes sent in glucose logs for review by Dr. Magana. The following adjustments were made:    NPH 30 units AM and 26 units HS    Reviewed changes with patient. Next MFM appointment scheduled for 10/31.

## 2022-10-30 NOTE — PROGRESS NOTES
MATERNAL FETAL MEDICINE Follow up Note    Dear Dr Fide Shelton MD:    Thank you for your kind referral of Ynes Flores.  As you know, she is a 36 y.o.   at 23 5 /7 weeks gestation (Estimated Date of Delivery: 23). This is a Follow up.    Her antepartum course is complicated by:  AMA  GDMA2  Obese BMI 36    Aneuploidy Screening: declined    HPI: Today, she denies headache, blurry vision, RUQ pain. No vaginal bleeding, no contractions.     Review of History:  Past Medical History:   Diagnosis Date   • Diabetes mellitus (HCC)    • Gestational diabetes    • Placenta previa 2020    Resolved this pregnancy   • Vaginal delivery      Past Surgical History:   Procedure Laterality Date   • WISDOM TOOTH EXTRACTION         Social History     Socioeconomic History   • Marital status:    Tobacco Use   • Smoking status: Never   • Smokeless tobacco: Never   Vaping Use   • Vaping Use: Never used   Substance and Sexual Activity   • Alcohol use: No   • Drug use: No   • Sexual activity: Yes     Partners: Male     Birth control/protection: None     Family History   Problem Relation Age of Onset   • Thyroid cancer Mother    • Diabetes Mother    • Colon cancer Maternal Uncle    • Skin cancer Maternal Grandmother    • Ovarian cancer Grandchild    • Ovarian cancer Grandchild    • Birth defects Neg Hx       Allergies   Allergen Reactions   • Amoxicillin Hives   • Cefdinir Hives   • Penicillins Hives      Current Outpatient Medications on File Prior to Visit   Medication Sig Dispense Refill   • aspirin 81 MG EC tablet Take 81 mg by mouth Daily.     • cetirizine (zyrTEC) 10 MG tablet Take 1 tablet by mouth Daily.     • glucose 4-6 GM-MG per chewable tablet      • glucose blood test strip Use as instructed 250 each 12   • insulin NPH (HumuLIN N) 100 UNIT/ML injection Inject 50 Units under the skin into the appropriate area as directed 2 (Two) Times a Day Before Meals. Take 15 u in the AM and 10 in PM before bed,  "variable dose up to 50u--to be adjusted by physician on an ongoing basis. 20 mL 12   • Insulin Syringe-Needle U-100 31G X 5/16\" 1 ML misc Use as directed with NPH insulin 300 each 5   • Lancets 30G misc 1 Device 4 (Four) Times a Day. 120 each 11   • Nutritional Supplements (Glucose Management) tablet Take 4 g by mouth As Needed (hypoglycemia sugar <60). 50 tablet 2   • Prenatal Vit-Fe Sulfate-FA-DHA (Prenatal Vitamin/Min +DHA) 27-0.8-200 MG capsule Take 1 tablet by mouth Daily. And 1 capsule daily 60 capsule 11   • [DISCONTINUED] glucose blood test strip Use as instructed to test 4 times daily 120 each 12   • cetirizine (zyrTEC) 10 MG tablet Take 10 mg by mouth Daily.     • Prenatal MV-Min-Fe Fum-FA-DHA (Prenatal Multi +DHA) 27-0.8-250 MG capsule Take 1 capsule by mouth Daily.       No current facility-administered medications on file prior to visit.        Past obstetric, gynecological, medical, surgical, family and social history reviewed.  Relevant lab work and imaging reviewed.    Review of systems  Constitutional:  denies fever, chills, malaise.   ENT/Mouth:  denies sore throat, tinnitis  Eyes: denies vision changes/pain  CV:  denies chest pain  Respiratory:  denies cough/SOB  GI:  denies N/V, diarrhea, abdominal pain.    :   denies dysuria  Skin:  denies lesions or pruritis   Neuro:  denies weakness, focal neurologic symptoms    Vitals:    10/31/22 1050   BP: 121/70   BP Location: Right arm   Patient Position: Sitting   Pulse: 97   Temp: 98.4 °F (36.9 °C)   TempSrc: Temporal   Weight: 96 kg (211 lb 9.6 oz)       PHYSICAL EXAM   GENERAL: Not in acute distress, AAOx3, pleasant  CARDIO: regular rate and rhythm  PULM: symmetric chest rise, speaking in complete sentences without difficulty  NEURO: awake, alert and oriented to person, place, and time  ABDOMINAL: No fundal tenderness, no rebound or guarding, gravid  EXTREMITIES: no bilateral lower extremity edema/tenderness  SKIN: Warm, " well-perfused      ULTRASOUND   Please view full ultrasound note on Imaging tab in ViewPoint.  Cephalic presentation  Posterior placenta.  MVP 5.4 cm, which is normal.   g (34%, AC 30%)    ASSESSMENT/COUNSELIN y.o.   at 23 5 /7 weeks gestation (Estimated Date of Delivery: 23).    -Pregnancy  [ X ] stable  [   ] improving [  ] worsening    Gestational diabetes  She has been doing very well.  We tweaked her insulin regimen today, but overall her numbers are very good and she was congratulated.  She feels like overall meals are going well and she is doing the carbs and snacks effectively.  Fasting 70--80's mostly, 60-low 100's, meals  (she is going to keep working on meal carbs).     NPH stayed at .  She will send in sugars next week.    Diagnoses and all orders for this visit:    1. Gestational diabetes mellitus (GDM) controlled on oral hypoglycemic drug, antepartum (Primary)  -     [facility]; Future    Other orders  -     glucose blood test strip; Use as instructed to test 4 times daily  Dispense: 250 each; Refill: 12      Advanced Maternal Age  [ X ] stable  [   ] improving [  ] worsening    Patient declined genetic screening previously.  She has a normal anatomy.      Maternal age greater than 35  has been associated with placental insufficiency with increased risk of fetal growth disorder, stillbirth, hypertensive disorders. Recommend fetal growth surveillance.     Summary of Plan  -Serial growth ultrasounds every 4 weeks at [facility] office  -Starting at 32 weeks: Weekly fetal  surveillance until delivery  -Will send in sugars weekly as she has been well-controlled overall.   -Will see again at 28 weeks for growth    Follow-up: 4 weeks for growth    Thank you for the consult and opportunity to care for this patient.  Please feel free to reach out with any questions or concerns.      I spent 30 minutes caring for this patient on this date of  service. This time includes time spent by me in the following activities: preparing for the visit, reviewing tests, obtaining and/or reviewing a separately obtained history, performing a medically appropriate examination and/or evaluation, counseling and educating the patient/family/caregiver and independently interpreting results and communicating that information with the patient/family/caregiver with greater than 50% spent in counseling and coordination of care.     Nohelia Magana MD [facility]  Maternal Fetal Medicine-[facility]  Office: [phone]  [email or web address]

## 2022-10-31 ENCOUNTER — OFFICE VISIT (OUTPATIENT)
Dept: OBSTETRICS AND GYNECOLOGY | Facility: CLINIC | Age: 37
End: 2022-10-31

## 2022-10-31 ENCOUNTER — HOSPITAL ENCOUNTER (OUTPATIENT)
Dept: ULTRASOUND IMAGING | Facility: HOSPITAL | Age: 37
Discharge: HOME | End: 2022-10-31
Admitting: OBSTETRICS & GYNECOLOGY

## 2022-10-31 VITALS
TEMPERATURE: 98.4 F | WEIGHT: 211.6 LBS | BODY MASS INDEX: 36.32 KG/M2 | DIASTOLIC BLOOD PRESSURE: 70 MMHG | HEART RATE: 97 BPM | SYSTOLIC BLOOD PRESSURE: 121 MMHG

## 2022-10-31 DIAGNOSIS — O24.415 GESTATIONAL DIABETES MELLITUS (GDM) CONTROLLED ON ORAL HYPOGLYCEMIC DRUG, ANTEPARTUM: Primary | ICD-10-CM

## 2022-10-31 DIAGNOSIS — O24.410 DIET CONTROLLED GESTATIONAL DIABETES MELLITUS (GDM), ANTEPARTUM: ICD-10-CM

## 2022-10-31 PROCEDURE — 76816 OB US FOLLOW-UP PER FETUS: CPT

## 2022-10-31 PROCEDURE — 99214 OFFICE O/P EST MOD 30 MIN: CPT | Performed by: OBSTETRICS & GYNECOLOGY

## 2022-10-31 PROCEDURE — 76816 OB US FOLLOW-UP PER FETUS: CPT | Performed by: OBSTETRICS & GYNECOLOGY

## 2022-10-31 RX ORDER — CETIRIZINE HYDROCHLORIDE 10 MG/1
10 TABLET ORAL DAILY
COMMUNITY
End: 2023-02-17 | Stop reason: HOSPADM

## 2022-10-31 RX ORDER — BLOOD PRESSURE TEST KIT
KIT MISCELLANEOUS
Qty: 250 EACH | Refills: 4 | Status: SHIPPED | OUTPATIENT
Start: 2022-10-31 | End: 2023-03-29

## 2022-10-31 NOTE — PROGRESS NOTES
Pt reports that she is doing well and denies vaginal bleeding, cramping, contractions or LOF at this time. Reports active fetal movement. Denies HA, visual changes or epigastric pain. Denies any additional complaints at time of appointment. Next OB appointment scheduled for 11/7.  Glucose logs on chart for Dr. Magana to review. More logs provided to patient at this time.     Vitals:    10/31/22 1050   BP: 121/70   Pulse: 97   Temp: 98.4 °F (36.9 °C)

## 2022-11-15 ENCOUNTER — DOCUMENTATION (OUTPATIENT)
Dept: OBSTETRICS AND GYNECOLOGY | Facility: CLINIC | Age: 37
End: 2022-11-15

## 2022-11-15 NOTE — PROGRESS NOTES
Ynes sent in glucose logs to be evaluated by Dr. Magana. The following changes were made:    NPH 35/26    Pt made aware. Will send in glucose logs on 11/22 for evaluation. Next MFM appointment scheduled for 11/28.

## 2022-11-22 ENCOUNTER — DOCUMENTATION (OUTPATIENT)
Dept: OBSTETRICS AND GYNECOLOGY | Facility: CLINIC | Age: 37
End: 2022-11-22

## 2022-11-22 NOTE — PROGRESS NOTES
Pt sent in glucose logs for Dr. Magana to review. MFM made the following adjustments:    NPH: 40/30    Next MFM appointment scheduled for 11/28.

## 2022-11-25 NOTE — PROGRESS NOTES
MATERNAL FETAL MEDICINE Follow up Note    Dear Dr Fide Shelton MD:    Thank you for your kind referral of Ynes Flores.  As you know, she is a 37 y.o.   at 27 5 /7 weeks gestation (Estimated Date of Delivery: 23). This is a Follow up.    Her antepartum course is complicated by:  AMA  GDMA2  Obese BMI 36    Aneuploidy Screening: declined    HPI: Today, she denies headache, blurry vision, RUQ pain. No vaginal bleeding, no contractions.     Review of History:  Past Medical History:   Diagnosis Date   • Diabetes mellitus (HCC)    • Gestational diabetes    • Placenta previa 2020    Resolved this pregnancy   • Vaginal delivery      Past Surgical History:   Procedure Laterality Date   • WISDOM TOOTH EXTRACTION         Social History     Socioeconomic History   • Marital status:    Tobacco Use   • Smoking status: Never   • Smokeless tobacco: Never   Vaping Use   • Vaping Use: Never used   Substance and Sexual Activity   • Alcohol use: No   • Drug use: No   • Sexual activity: Yes     Partners: Male     Birth control/protection: None     Family History   Problem Relation Age of Onset   • Thyroid cancer Mother    • Diabetes Mother    • Colon cancer Maternal Uncle    • Skin cancer Maternal Grandmother    • Ovarian cancer Grandchild    • Ovarian cancer Grandchild    • Birth defects Neg Hx       Allergies   Allergen Reactions   • Amoxicillin Hives   • Cefdinir Hives   • Penicillins Hives      Current Outpatient Medications on File Prior to Visit   Medication Sig Dispense Refill   • Alcohol Swabs pads Please substitute as insurance requires.  Use with lancets for checking blood sugars 250 each 4   • aspirin 81 MG EC tablet Take 81 mg by mouth Daily.     • cetirizine (zyrTEC) 10 MG tablet Take 1 tablet by mouth Daily.     • glucose 4-6 GM-MG per chewable tablet      • glucose blood test strip Use as instructed 250 each 12   • glucose blood test strip Use as instructed to test 4 times daily 250 each  "12   • insulin NPH (HumuLIN N) 100 UNIT/ML injection Inject 50 Units under the skin into the appropriate area as directed 2 (Two) Times a Day Before Meals. Take 15 u in the AM and 10 in PM before bed, variable dose up to 50u--to be adjusted by physician on an ongoing basis. 20 mL 12   • Insulin Syringe-Needle U-100 31G X 5/16\" 1 ML misc Use as directed with NPH insulin 300 each 5   • Lancets 30G misc 1 Device 4 (Four) Times a Day. 120 each 11   • Nutritional Supplements (Glucose Management) tablet Take 4 g by mouth As Needed (hypoglycemia sugar <60). 50 tablet 2   • Prenatal Vit-Fe Sulfate-FA-DHA (Prenatal Vitamin/Min +DHA) 27-0.8-200 MG capsule Take 1 tablet by mouth Daily. And 1 capsule daily 60 capsule 11   • cetirizine (zyrTEC) 10 MG tablet Take 10 mg by mouth Daily.     • Prenatal MV-Min-Fe Fum-FA-DHA (Prenatal Multi +DHA) 27-0.8-250 MG capsule Take 1 capsule by mouth Daily.       No current facility-administered medications on file prior to visit.        Past obstetric, gynecological, medical, surgical, family and social history reviewed.  Relevant lab work and imaging reviewed.    Review of systems  Constitutional:  denies fever, chills, malaise.   ENT/Mouth:  denies sore throat, tinnitis  Eyes: denies vision changes/pain  CV:  denies chest pain  Respiratory:  denies cough/SOB  GI:  denies N/V, diarrhea, abdominal pain.    :   denies dysuria  Skin:  denies lesions or pruritis   Neuro:  denies weakness, focal neurologic symptoms    Vitals:    11/28/22 1308   BP: 117/59   BP Location: Right arm   Patient Position: Sitting   Pulse: 84   Temp: 98 °F (36.7 °C)   TempSrc: Oral   Weight: 97.1 kg (214 lb)       PHYSICAL EXAM   GENERAL: Not in acute distress, AAOx3, pleasant  CARDIO: regular rate and rhythm  PULM: symmetric chest rise, speaking in complete sentences without difficulty  NEURO: awake, alert and oriented to person, place, and time  ABDOMINAL: No fundal tenderness, no rebound or guarding, " gravid  EXTREMITIES: no bilateral lower extremity edema/tenderness  SKIN: Warm, well-perfused      ULTRASOUND   Please view full ultrasound note on Imaging tab in ViewPoint.  Tranverse presentation.  Posterior placenta.  ROSALEE 11.7 cm, which is normal.  EFW 1026 g (40%, AC 26%)    ASSESSMENT/COUNSELIN y.o.   at 27 5 /7 weeks gestation (Estimated Date of Delivery: 23).    -Pregnancy  [ X ] stable  [   ] improving [  ] worsening    Gestational diabetes  She has been doing very well.  We tweaked her insulin regimen today, but overall her numbers are very good and she was congratulated.  She feels like overall meals are going well and she is doing the carbs and snacks effectively.  Fasting 70--90's mostly, 60-low 100's, meals 100-150's but mostly 100's-130's (she is going to keep working on meal carbs).      NPH .  I added Metformin 500 mg XR BID at breakfast and dinner.  She will send in sugars next week.    Diagnoses and all orders for this visit:    1. Insulin controlled gestational diabetes mellitus (GDM) during pregnancy, antepartum (Primary)  -     [facility]; Future    Other orders  -     metFORMIN ER (GLUCOPHAGE-XR) 500 MG 24 hr tablet; Take 1 tablet by mouth Daily With Breakfast.  Dispense: 60 tablet; Refill: 5      Advanced Maternal Age  [ X ] stable  [   ] improving [  ] worsening    Patient declined genetic screening previously.  She has a normal anatomy.      Maternal age greater than 35  has been associated with placental insufficiency with increased risk of fetal growth disorder, stillbirth, hypertensive disorders. Recommend fetal growth surveillance.     Summary of Plan  -Serial growth ultrasounds every 4 weeks at [facility] office  -Starting at 32 weeks: Weekly fetal  surveillance until delivery  -Will send in sugars weekly as she has been well-controlled overall.   -Will see again at 28 weeks for growth    Follow-up: 4 weeks for growth    Thank you for  the consult and opportunity to care for this patient.  Please feel free to reach out with any questions or concerns.      I spent 30 minutes caring for this patient on this date of service. This time includes time spent by me in the following activities: preparing for the visit, reviewing tests, obtaining and/or reviewing a separately obtained history, performing a medically appropriate examination and/or evaluation, counseling and educating the patient/family/caregiver and independently interpreting results and communicating that information with the patient/family/caregiver with greater than 50% spent in counseling and coordination of care.     Noheila Magana MD FACOG  Maternal Fetal Medicine-[facility]  Office: [phone]  [email or web address]

## 2022-11-28 ENCOUNTER — HOSPITAL ENCOUNTER (OUTPATIENT)
Dept: ULTRASOUND IMAGING | Facility: HOSPITAL | Age: 37
Discharge: HOME | End: 2022-11-28
Admitting: OBSTETRICS & GYNECOLOGY

## 2022-11-28 ENCOUNTER — OFFICE VISIT (OUTPATIENT)
Dept: OBSTETRICS AND GYNECOLOGY | Facility: CLINIC | Age: 37
End: 2022-11-28

## 2022-11-28 VITALS
WEIGHT: 214 LBS | TEMPERATURE: 98 F | HEART RATE: 84 BPM | BODY MASS INDEX: 36.73 KG/M2 | SYSTOLIC BLOOD PRESSURE: 117 MMHG | DIASTOLIC BLOOD PRESSURE: 59 MMHG

## 2022-11-28 DIAGNOSIS — O24.414 INSULIN CONTROLLED GESTATIONAL DIABETES MELLITUS (GDM) DURING PREGNANCY, ANTEPARTUM: Primary | ICD-10-CM

## 2022-11-28 DIAGNOSIS — O24.415 GESTATIONAL DIABETES MELLITUS (GDM) CONTROLLED ON ORAL HYPOGLYCEMIC DRUG, ANTEPARTUM: ICD-10-CM

## 2022-11-28 LAB — GLUCOSE BLDC GLUCOMTR-MCNC: 116 MG/DL (ref 70–130)

## 2022-11-28 PROCEDURE — 76816 OB US FOLLOW-UP PER FETUS: CPT | Performed by: OBSTETRICS & GYNECOLOGY

## 2022-11-28 PROCEDURE — 99214 OFFICE O/P EST MOD 30 MIN: CPT | Performed by: OBSTETRICS & GYNECOLOGY

## 2022-11-28 PROCEDURE — 76816 OB US FOLLOW-UP PER FETUS: CPT

## 2022-11-28 PROCEDURE — 82962 GLUCOSE BLOOD TEST: CPT

## 2022-11-28 RX ORDER — METFORMIN HYDROCHLORIDE 500 MG/1
500 TABLET, EXTENDED RELEASE ORAL
Qty: 60 TABLET | Refills: 5 | Status: SHIPPED | OUTPATIENT
Start: 2022-11-28 | End: 2022-11-29

## 2022-11-28 NOTE — PROGRESS NOTES
Pt reports that she is doing well and denies vaginal bleeding, cramping, contractions or LOF at this time. Reports occasional lower abdominal pressure, denies consistency. Reports active fetal movement. Denies HA, visual changes or epigastric pain. Denies any additional complaints at time of appointment. Next OB appointment scheduled for 11/29.    Vitals:    11/28/22 1308   BP: 117/59   Pulse: 84   Temp: 98 °F (36.7 °C)

## 2022-11-29 ENCOUNTER — ROUTINE PRENATAL (OUTPATIENT)
Dept: OBSTETRICS AND GYNECOLOGY | Age: 37
End: 2022-11-29

## 2022-11-29 VITALS — WEIGHT: 211 LBS | DIASTOLIC BLOOD PRESSURE: 64 MMHG | BODY MASS INDEX: 36.22 KG/M2 | SYSTOLIC BLOOD PRESSURE: 120 MMHG

## 2022-11-29 DIAGNOSIS — Z3A.27 27 WEEKS GESTATION OF PREGNANCY: Primary | ICD-10-CM

## 2022-11-29 DIAGNOSIS — Z13.1 SCREENING FOR DIABETES MELLITUS: ICD-10-CM

## 2022-11-29 DIAGNOSIS — Z13.89 SCREENING FOR HEMATURIA OR PROTEINURIA: ICD-10-CM

## 2022-11-29 DIAGNOSIS — Z13.0 SCREENING FOR IRON DEFICIENCY ANEMIA: ICD-10-CM

## 2022-11-29 LAB
ERYTHROCYTE [DISTWIDTH] IN BLOOD BY AUTOMATED COUNT: 12.8 % (ref 12.3–15.4)
GLUCOSE UR STRIP-MCNC: NEGATIVE MG/DL
HCT VFR BLD AUTO: 36.2 % (ref 34–46.6)
HGB BLD-MCNC: 12.1 G/DL (ref 12–15.9)
MCH RBC QN AUTO: 29.7 PG (ref 26.6–33)
MCHC RBC AUTO-ENTMCNC: 33.4 G/DL (ref 31.5–35.7)
MCV RBC AUTO: 88.9 FL (ref 79–97)
PLATELET # BLD AUTO: 254 10*3/MM3 (ref 140–450)
PROT UR STRIP-MCNC: NEGATIVE MG/DL
RBC # BLD AUTO: 4.07 10*6/MM3 (ref 3.77–5.28)
WBC # BLD AUTO: 9.59 10*3/MM3 (ref 3.4–10.8)

## 2022-11-29 PROCEDURE — 90471 IMMUNIZATION ADMIN: CPT | Performed by: OBSTETRICS & GYNECOLOGY

## 2022-11-29 PROCEDURE — 90715 TDAP VACCINE 7 YRS/> IM: CPT | Performed by: OBSTETRICS & GYNECOLOGY

## 2022-11-29 PROCEDURE — 0502F SUBSEQUENT PRENATAL CARE: CPT | Performed by: OBSTETRICS & GYNECOLOGY

## 2022-11-29 RX ORDER — METFORMIN HYDROCHLORIDE 500 MG/1
500 TABLET, EXTENDED RELEASE ORAL 2 TIMES DAILY
Qty: 60 TABLET | Refills: 5 | Status: SHIPPED | OUTPATIENT
Start: 2022-11-29 | End: 2022-12-16 | Stop reason: SDUPTHER

## 2022-11-29 NOTE — PROGRESS NOTES
Pt presents for routine visit. She denies ctx, vag bleeding/leaking fluid. She notes active fetal movement.     A/p: GDM: managed by [facility]. Glucose log reviewed. Overall good values, she started metformin following recent visit and continues insulin. Will start BPP 32 weeks. Had normal growth yesterday.    AMA: on baby asa, continue growth surveillance and  testing    27 weeks: CBC today, tdap given today. Pt declines flu shot. Reviewed PTL warnings. Advised daily fmc's. F/u 2 weeks.

## 2022-12-13 ENCOUNTER — TELEPHONE (OUTPATIENT)
Dept: OBSTETRICS AND GYNECOLOGY | Facility: CLINIC | Age: 37
End: 2022-12-13

## 2022-12-13 NOTE — TELEPHONE ENCOUNTER
Called pt to review blood sugar log and to uptitrate her insulin based on her readings. Discussed changes with the pt and she verbalized understanding.  I would send new regime to Cyndy and she could call back if she had questions or concerns.      Old regime:  NPH:  48/30   Metformin 1000mg bid  New regime:  NPH:  52/30  Metformin 1000mg bid     If this does not cover her high blood sugars toward the end of the day, may need to adjust to a meal time insulin.      LIEN Smith, FNP-BC  Maternal Fetal Medicine-[address], [address]  [place] [address]  Office: [phone]

## 2022-12-16 RX ORDER — METFORMIN HYDROCHLORIDE 500 MG/1
1000 TABLET, EXTENDED RELEASE ORAL 2 TIMES DAILY
Qty: 60 TABLET | Refills: 5 | Status: SHIPPED | OUTPATIENT
Start: 2022-12-16 | End: 2023-02-17 | Stop reason: HOSPADM

## 2022-12-21 ENCOUNTER — DOCUMENTATION (OUTPATIENT)
Dept: OBSTETRICS AND GYNECOLOGY | Facility: CLINIC | Age: 37
End: 2022-12-21

## 2022-12-21 NOTE — PROGRESS NOTES
Ynes sent in blood sugar logs for evaluation by M. No changes to make at this time. Will reevaluate blood sugar logs at next MFM appointment on 12/28.

## 2022-12-27 ENCOUNTER — ROUTINE PRENATAL (OUTPATIENT)
Dept: OBSTETRICS AND GYNECOLOGY | Age: 37
End: 2022-12-27

## 2022-12-27 VITALS — DIASTOLIC BLOOD PRESSURE: 78 MMHG | SYSTOLIC BLOOD PRESSURE: 112 MMHG | BODY MASS INDEX: 36.73 KG/M2 | WEIGHT: 214 LBS

## 2022-12-27 DIAGNOSIS — Z87.59 HISTORY OF PRIOR PREGNANCY WITH IUGR NEWBORN: ICD-10-CM

## 2022-12-27 DIAGNOSIS — O09.521 MULTIGRAVIDA OF ADVANCED MATERNAL AGE IN FIRST TRIMESTER: ICD-10-CM

## 2022-12-27 DIAGNOSIS — O24.414 INSULIN CONTROLLED GESTATIONAL DIABETES MELLITUS (GDM) IN SECOND TRIMESTER: ICD-10-CM

## 2022-12-27 DIAGNOSIS — Z3A.31 31 WEEKS GESTATION OF PREGNANCY: Primary | ICD-10-CM

## 2022-12-27 DIAGNOSIS — R53.83 OTHER FATIGUE: ICD-10-CM

## 2022-12-27 PROBLEM — R73.09 ELEVATED HEMOGLOBIN A1C: Status: RESOLVED | Noted: 2022-08-16 | Resolved: 2022-12-27

## 2022-12-27 LAB
GLUCOSE UR STRIP-MCNC: NEGATIVE MG/DL
PROT UR STRIP-MCNC: NEGATIVE MG/DL

## 2022-12-27 PROCEDURE — 0502F SUBSEQUENT PRENATAL CARE: CPT | Performed by: OBSTETRICS & GYNECOLOGY

## 2022-12-27 NOTE — PROGRESS NOTES
Pt presents for routine visit. She complains of mild fatigue. She denies reg ctx, vag bleeding or leaking fluid. She notes active fetal movement.    U/s: BPP 8/8, larisa 13.6    A/p: GDM: reassuring BPP. Reviewed glucose log and rare elevations with metformin and NPH insulin 52 units in AM and 30 units PM. Pt is managed by MFM and has growth u/s there tomorrow. Continue weekly BPP and advised daily fmc's.     AMA: on baby asa, continue growth surveillance and weekly BPP    Fatigue: check cbc, ferritin and repeat TSH    Hx IUGR prior preg: growth with MFM tomorrow, continue weekly BPP

## 2022-12-28 ENCOUNTER — TELEPHONE (OUTPATIENT)
Dept: OBSTETRICS AND GYNECOLOGY | Facility: CLINIC | Age: 37
End: 2022-12-28

## 2022-12-28 ENCOUNTER — HOSPITAL ENCOUNTER (OUTPATIENT)
Dept: ULTRASOUND IMAGING | Facility: HOSPITAL | Age: 37
Discharge: HOME | End: 2022-12-28
Admitting: OBSTETRICS & GYNECOLOGY

## 2022-12-28 ENCOUNTER — OFFICE VISIT (OUTPATIENT)
Dept: OBSTETRICS AND GYNECOLOGY | Facility: CLINIC | Age: 37
End: 2022-12-28

## 2022-12-28 VITALS
TEMPERATURE: 98 F | SYSTOLIC BLOOD PRESSURE: 126 MMHG | HEART RATE: 83 BPM | BODY MASS INDEX: 36.77 KG/M2 | WEIGHT: 214.2 LBS | DIASTOLIC BLOOD PRESSURE: 65 MMHG

## 2022-12-28 DIAGNOSIS — O24.414 INSULIN CONTROLLED GESTATIONAL DIABETES MELLITUS (GDM) IN SECOND TRIMESTER: Primary | ICD-10-CM

## 2022-12-28 DIAGNOSIS — O24.414 INSULIN CONTROLLED GESTATIONAL DIABETES MELLITUS (GDM) DURING PREGNANCY, ANTEPARTUM: ICD-10-CM

## 2022-12-28 LAB
BASOPHILS # BLD AUTO: 0 X10E3/UL (ref 0–0.2)
BASOPHILS NFR BLD AUTO: 0 %
EOSINOPHIL # BLD AUTO: 0.1 X10E3/UL (ref 0–0.4)
EOSINOPHIL NFR BLD AUTO: 1 %
ERYTHROCYTE [DISTWIDTH] IN BLOOD BY AUTOMATED COUNT: 12.8 % (ref 11.7–15.4)
FERRITIN SERPL-MCNC: 25 NG/ML (ref 15–150)
GLUCOSE BLDC GLUCOMTR-MCNC: 78 MG/DL (ref 70–130)
HCT VFR BLD AUTO: 35.5 % (ref 34–46.6)
HGB BLD-MCNC: 11.9 G/DL (ref 11.1–15.9)
IMM GRANULOCYTES # BLD AUTO: 0 X10E3/UL (ref 0–0.1)
IMM GRANULOCYTES NFR BLD AUTO: 0 %
LYMPHOCYTES # BLD AUTO: 2.3 X10E3/UL (ref 0.7–3.1)
LYMPHOCYTES NFR BLD AUTO: 22 %
MCH RBC QN AUTO: 28.7 PG (ref 26.6–33)
MCHC RBC AUTO-ENTMCNC: 33.5 G/DL (ref 31.5–35.7)
MCV RBC AUTO: 86 FL (ref 79–97)
MONOCYTES # BLD AUTO: 0.5 X10E3/UL (ref 0.1–0.9)
MONOCYTES NFR BLD AUTO: 5 %
NEUTROPHILS # BLD AUTO: 7.8 X10E3/UL (ref 1.4–7)
NEUTROPHILS NFR BLD AUTO: 72 %
PLATELET # BLD AUTO: 261 X10E3/UL (ref 150–450)
RBC # BLD AUTO: 4.14 X10E6/UL (ref 3.77–5.28)
TSH SERPL DL<=0.005 MIU/L-ACNC: 0.54 UIU/ML (ref 0.45–4.5)
WBC # BLD AUTO: 10.8 X10E3/UL (ref 3.4–10.8)

## 2022-12-28 PROCEDURE — 76816 OB US FOLLOW-UP PER FETUS: CPT | Performed by: OBSTETRICS & GYNECOLOGY

## 2022-12-28 PROCEDURE — 76819 FETAL BIOPHYS PROFIL W/O NST: CPT

## 2022-12-28 PROCEDURE — 76816 OB US FOLLOW-UP PER FETUS: CPT

## 2022-12-28 PROCEDURE — 76819 FETAL BIOPHYS PROFIL W/O NST: CPT | Performed by: OBSTETRICS & GYNECOLOGY

## 2022-12-28 PROCEDURE — 99214 OFFICE O/P EST MOD 30 MIN: CPT | Performed by: NURSE PRACTITIONER

## 2022-12-28 PROCEDURE — 82962 GLUCOSE BLOOD TEST: CPT

## 2022-12-28 NOTE — PROGRESS NOTES
MATERNAL FETAL MEDICINE F/U Note    Dear Dr Fide Shelton MD:    Thank you for your kind referral of Ynes Flores.  As you know, she is a 37 y.o.   At 32+ 0/7 weeks gestation (Estimated Date of Delivery: 23). This is a follow-up visit.      Her antepartum course is complicated by:  AMA  GDMA2  BMI 36    Aneuploidy Screening:  declined      HPI: Today, she denies headache, blurry vision, RUQ pain. No vaginal bleeding, vaginal leakage, no contractions and endorses fetal movement.      Review of History:  Past Medical History:   Diagnosis Date   • Diabetes mellitus (HCC)    • Gestational diabetes    • Placenta previa 2020    Resolved this pregnancy   • Vaginal delivery      Past Surgical History:   Procedure Laterality Date   • WISDOM TOOTH EXTRACTION         OB Hx:    Social History     Socioeconomic History   • Marital status:    Tobacco Use   • Smoking status: Never   • Smokeless tobacco: Never   Vaping Use   • Vaping Use: Never used   Substance and Sexual Activity   • Alcohol use: No   • Drug use: No   • Sexual activity: Yes     Partners: Male     Birth control/protection: None     Family History   Problem Relation Age of Onset   • Thyroid cancer Mother    • Diabetes Mother    • Colon cancer Maternal Uncle    • Skin cancer Maternal Grandmother    • Ovarian cancer Grandchild    • Ovarian cancer Grandchild    • Birth defects Neg Hx       Allergies   Allergen Reactions   • Amoxicillin Hives   • Cefdinir Hives   • Penicillins Hives      Current Outpatient Medications on File Prior to Visit   Medication Sig Dispense Refill   • Alcohol Swabs pads Please substitute as insurance requires.  Use with lancets for checking blood sugars 250 each 4   • aspirin 81 MG EC tablet Take 81 mg by mouth Daily.     • cetirizine (zyrTEC) 10 MG tablet Take 1 tablet by mouth Daily.     • glucose 4-6 GM-MG per chewable tablet      • glucose blood test strip Use as instructed 250 each 12   • glucose blood test  "strip Use as instructed to test 4 times daily 250 each 12   • insulin NPH (HumuLIN N) 100 UNIT/ML injection Inject 50 Units under the skin into the appropriate area as directed 2 (Two) Times a Day Before Meals. Take 15 u in the AM and 10 in PM before bed, variable dose up to 50u--to be adjusted by physician on an ongoing basis. 20 mL 12   • Insulin Syringe-Needle U-100 31G X 5/16\" 1 ML misc Use as directed with NPH insulin 300 each 5   • Lancets 30G misc 1 Device 4 (Four) Times a Day. 120 each 11   • metFORMIN ER (GLUCOPHAGE-XR) 500 MG 24 hr tablet Take 2 tablets by mouth 2 (Two) Times a Day. 60 tablet 5   • Prenatal MV-Min-Fe Fum-FA-DHA (Prenatal Multi +DHA) 27-0.8-250 MG capsule Take 1 capsule by mouth Daily.     • cetirizine (zyrTEC) 10 MG tablet Take 10 mg by mouth Daily.     • Nutritional Supplements (Glucose Management) tablet Take 4 g by mouth As Needed (hypoglycemia sugar <60). 50 tablet 2   • Prenatal Vit-Fe Sulfate-FA-DHA (Prenatal Vitamin/Min +DHA) 27-0.8-200 MG capsule Take 1 tablet by mouth Daily. And 1 capsule daily 60 capsule 11     No current facility-administered medications on file prior to visit.        Past obstetric, gynecological, medical, surgical, family and social history reviewed.  Relevant lab work and imaging reviewed.    Review of systems  Constitutional:  denies fever, chills, malaise.   ENT/Mouth:  denies sore throat, tinnitis  Eyes: denies vision changes/pain  CV:  denies chest pain  Respiratory:  denies cough/SOB  GI:  denies N/V, diarrhea, abdominal pain.    :   denies dysuria  Skin:  denies lesions or pruritis   Neuro:  denies weakness, focal neurologic symptoms    Vitals:    12/28/22 1022   BP: 126/65   BP Location: Right arm   Patient Position: Sitting   Pulse: 83   Temp: 98 °F (36.7 °C)   TempSrc: Temporal   Weight: 97.2 kg (214 lb 3.2 oz)       PHYSICAL EXAM   GENERAL: Not in acute distress, AAOx3, pleasant  CARDIO: regular rate and rhythm  PULM: symmetric chest rise, " speaking in complete sentences without difficulty  NEURO: awake, alert and oriented to person, place, and time  ABDOMINAL: No fundal tenderness, no rebound or guarding, gravid  EXTREMITIES: no bilateral lower extremity edema/tenderness  SKIN: Warm, well-perfused      ULTRASOUND   Please view full ultrasound note on Imaging tab in ViewPoint.  Biophysical Profile   ===============     2: Fetal breathing movements   2: Gross body movements   2: Fetal tone   2: Amniotic fluid volume    Biophysical profile score       Impression   =========     Transverse presentation.   Anterior placenta.   ROSALEE 12.9 cm, which is normal.   EFW 1699 g (33%, AC 39%)   BPP .       Recommendation   ===============      testing weekly with primary OB and weekly diabetes management with us.       ASSESSMENT/COUNSELIN y.o.   at 32+ 0/7 weeks gestation (Estimated Date of Delivery: 23)    -Pregnancy  [ X ] stable  [   ] improving [  ] worsening      Diagnosis for this Visit    1. Insulin controlled gestational diabetes mellitus (GDM) during pregnancy, antepartum (Primary)  -     [facility]; Future  -Pt currently taking NPH      Other orders  -     metFORMIN ER (GLUCOPHAGE-XR) 500 MG 24 hr tablet; Take 1 tablet by mouth Daily With Breakfast.  Dispense: 60 tablet; Refill: 5      Reviewed blood sugar log in the office today.  Pt doing well.  Will go up slightly on NPH:  .  Warned pt about hypoglycemia and what to do if she has lows.  Pt has pills and keeps OJ in pantry to safeguard.  Pt further aware that if she is going to cheat on any snack, if may be her nighttime snack.      Gestational Diabetes  [ X ] stable  [   ] improving [  ] worsening      U/S reviewed with the pt during visit.           Summary of Plan  - testing weekly with primary OB and weekly diabetes management with MFM  -Weekly fetal  surveillance until delivery at OB office.  If anything appears  out of normal range, pt to be rescheduled with [facility].    -Will send in sugars weekly as she has been well-controlled overall and will see pt via Telehealth if necessary.    -Medication Regime:  Metformin 1000 BID; NPH 52/33  -Will see again at [facility] at 36 weeks for growth U/S with BPP.    -Fetal movement instructions given continue daily until delivery; instructed to report to labor and delivery if cannot achieve more than 10 kicks in one hour or if she perceives a decrease in fetal movement    Follow-up:  At 36 weeks for growth but am always available at request of primary OB if anything arises outside of normal.        Thank you for the consult and opportunity to care for this patient.  Please feel free to reach out with any questions or concerns.      I spent 20 minutes caring for this patient on this date of service. This time includes time spent by me in the following activities: preparing for the visit, reviewing tests, obtaining and/or reviewing a separately obtained history, performing a medically appropriate examination and/or evaluation, counseling and educating the patient/family/caregiver and independently interpreting results and communicating that information with the patient/family/caregiver with greater than 50% spent in counseling and coordination of care.     LIEN Smith, TIERRA-BC  Maternal Fetal Medicine-[facility]  Office: [phone]  [email or web address]

## 2022-12-28 NOTE — PROGRESS NOTES
Pt reports that she is doing well and denies vaginal bleeding, cramping, contractions or LOF at this time. Reports active fetal movement. Denies HA, visual changes or epigastric pain. Denies any additional complaints at time of appointment. Next OB appointment scheduled for 1/4.  Glucose logs on chart for MFM to review. Blood sugar taken at appointment today with a reading of 78.    Vitals:    12/28/22 1022   BP: 126/65   Pulse: 83   Temp: 98 °F (36.7 °C)

## 2022-12-28 NOTE — TELEPHONE ENCOUNTER
Ynes,     After I reviewed your chart with Dr. Magana, she was asking when we scheduled you to actually talk about your sugars.  I did not understand that she actually wanted you scheduled for that.  You can come in after your OB appt wkly or we can do a Telehealth Visit during the week sometime.  She just wants to be sure you are on our books so we do not let you fall through the cracks.  I apologize that I did not understand the way she wanted this done.  Please call our office at your convenience to schedule.  Thank you.     Called pt to give her the above msg, but pt did not answer.  Left info on her MyChart to call the office at her convenience to get her scheduled.      LIEN Smith, FNP-BC  Maternal Fetal Medicine-[address], [address]  [place] [address]  Office: [phone]

## 2023-01-04 ENCOUNTER — ROUTINE PRENATAL (OUTPATIENT)
Dept: OBSTETRICS AND GYNECOLOGY | Age: 38
End: 2023-01-04
Payer: COMMERCIAL

## 2023-01-04 VITALS — BODY MASS INDEX: 36.39 KG/M2 | SYSTOLIC BLOOD PRESSURE: 112 MMHG | DIASTOLIC BLOOD PRESSURE: 80 MMHG | WEIGHT: 212 LBS

## 2023-01-04 DIAGNOSIS — Z3A.33 33 WEEKS GESTATION OF PREGNANCY: Primary | ICD-10-CM

## 2023-01-04 LAB
GLUCOSE UR STRIP-MCNC: NEGATIVE MG/DL
PROT UR STRIP-MCNC: NEGATIVE MG/DL

## 2023-01-04 PROCEDURE — 0502F SUBSEQUENT PRENATAL CARE: CPT | Performed by: OBSTETRICS & GYNECOLOGY

## 2023-01-04 NOTE — PROGRESS NOTES
Pt comes in for routine visit and BPP. She notes active fetal movement and denies ctx, leaking fluid/bleeding. She notes her glucose values have been good and m did not adjust insulin with verbal review yesterday.    Glucose log reviewed and only rare mild pp elevations noted    BPP 8/8, larisa 10.8    A/p: GDM: MFM is managing and pt on NPH in am /pm and metformin. Glucose values are good overall. Will continue weekly BPP and growth u/s through [facility]. Last growth there was at 32 weeks and normal at 33 %. Continue daily [facility]    AMA: continue growth surveillance and baby as through 36 weeks    Fatigue: pt notes mild improvement after starting iron. She was advised every-other day but noted she can increase to daily for low range ferritin if she tolerates.

## 2023-01-06 NOTE — PROGRESS NOTES
MATERNAL FETAL MEDICINE - TELEHEALTH ENCOUNTER       Dear Dr. Shelton,     This is a followup visit for diabetes management.      You have chosen to receive care through a telehealth visit.  Do you consent to use a video/audio connection for your medical care today? The patient replied yes.     Thank you for requesting my service to provide consultation for Ynes Flores is a 37 y.o.   At 34+ 0/7 weeks gestation (Estimated Date of Delivery: 23).   She is being seen for:    AMA  GDMA2  (Diabetes Management)  BMI 36    Today, she denies headache, blurry vision, RUQ pain. No vaginal bleeding, no contractions and endorses positive fetal movement.      Chief complaint    ICD-10-CM ICD-9-CM   1. Insulin controlled gestational diabetes mellitus (GDM) in third trimester  O24.414 648.83   2. AMA (advanced maternal age) multigravida 35+, second trimester  O09.522 659.63   3. Increased BMI  R63.8 783.9       Denies headache, blurry vision, RUQ pain. No vaginal bleeding, no contractions.     Past obstetric, gynecological, medical, surgical, family and social history reviewed; no changes made.     Review of systems (All denied by pt via telehealth conversation)  Constitutional:  No Fever, No Chills, No Fatigue, No Malaise, AOX4  Eyes:  No Eye Pain, No Vision Changes  Cardiovascular:  No Chest Pain, No SOB, No Palpitations  Respiratory:  No Cough, No Wheezing, No Dyspnea      There were no vitals filed for this visit.     ULTRASOUND   Scheduled 2023 at [facility]       ASSESSMENT   37 y.o.   at 34+ 4/7 weeks gestation (Estimated Date of Delivery: 23), reassuring fetal and maternal status.       1. Insulin controlled gestational diabetes mellitus (GDM) during pregnancy, antepartum (Primary)  -     [facility]; Future  -Pt currently taking NPH /30     Other orders  -     metFORMIN ER (GLUCOPHAGE-XR) 500 MG 24 hr tablet; Take 1 tablet by mouth Daily With Breakfast.  Dispense: 60 tablet;  Refill: 5        Reviewed blood sugar log in the office today.  Pt doing well.  Will go up slightly on NPH:  .  Warned pt about hypoglycemia and what to do if she has lows.  Pt has pills and keeps OJ in pantry to safeguard.  Pt further aware that if she is going to cheat on any snack, if may be her nighttime snack.       Gestational Diabetes  [ X ] stable  [   ] improving [  ] worsening     Summary of Plan  - testing weekly with primary OB and weekly diabetes management with MFM  -Weekly fetal  surveillance until delivery at OB office.  If anything appears out of normal range, pt to be rescheduled with MFM.    -Will send in sugars weekly as she has been well-controlled overall and will see pt via Telehealth as necessary.    -Medication Regime:  Metformin 1000 BID; NPH   -Will see again at MFM at 36 weeks for growth U/S with BPP.    -Fetal movement instructions given continue daily until delivery; instructed to report to labor and delivery if cannot achieve more than 10 kicks in one hour or if she perceives a decrease in fetal movement     Follow-up:  In one wk for telehealth visit for diabetes management and at 36 weeks for growth but am always available at request of primary OB if anything arises outside of normal.        I have discussed over telehealth/video visit with patient, the management above,  for > 15 minutes. All patient questions were answered, and patient concerns addressed.  This note has been routed to the referring obstetricians/medical provider. Thank you for requesting this clinical consult.       LIEN Smith, FNP-BC  Maternal Fetal Medicine-[address], [address]  [place] [address]  Office: [phone]

## 2023-01-10 ENCOUNTER — ROUTINE PRENATAL (OUTPATIENT)
Dept: OBSTETRICS AND GYNECOLOGY | Age: 38
End: 2023-01-10
Payer: COMMERCIAL

## 2023-01-10 VITALS — DIASTOLIC BLOOD PRESSURE: 78 MMHG | WEIGHT: 214 LBS | SYSTOLIC BLOOD PRESSURE: 110 MMHG | BODY MASS INDEX: 36.73 KG/M2

## 2023-01-10 DIAGNOSIS — Z3A.33 33 WEEKS GESTATION OF PREGNANCY: Primary | ICD-10-CM

## 2023-01-10 LAB
GLUCOSE UR STRIP-MCNC: NEGATIVE MG/DL
PROT UR STRIP-MCNC: NEGATIVE MG/DL

## 2023-01-10 PROCEDURE — 0502F SUBSEQUENT PRENATAL CARE: CPT | Performed by: OBSTETRICS & GYNECOLOGY

## 2023-01-10 NOTE — PROGRESS NOTES
Pt presents for routine visit. She notes active fetal movement and denies ctx, vag bleeding/leaking fluid. She notes MFM has not adjusted her insulin and metformin recently.    U/s: BPP 8/8, larisa 14.9     Glucose log: fasting normal, highest 93; pp very good, highest 132 at 1 hr pp    A/p: GDM: pt managed by MFM. Continue daily fmc's and weekly BPP. Glucose control is good with current insulin/metformin regimen. Pt will discuss delivery timing with MFM at her next appt there.     AMA: pt on baby asa, recommend stop at 35 weeks    Hx IUGR: normal growth per MFM at 32 weeks. They will continue growth surveillance.

## 2023-01-11 ENCOUNTER — OFFICE VISIT (OUTPATIENT)
Dept: OBSTETRICS AND GYNECOLOGY | Facility: CLINIC | Age: 38
End: 2023-01-11
Payer: COMMERCIAL

## 2023-01-11 DIAGNOSIS — O24.414 INSULIN CONTROLLED GESTATIONAL DIABETES MELLITUS (GDM) IN THIRD TRIMESTER: Primary | ICD-10-CM

## 2023-01-11 DIAGNOSIS — R63.8 INCREASED BMI: ICD-10-CM

## 2023-01-11 DIAGNOSIS — O09.522 AMA (ADVANCED MATERNAL AGE) MULTIGRAVIDA 35+, SECOND TRIMESTER: ICD-10-CM

## 2023-01-11 PROCEDURE — 99214 OFFICE O/P EST MOD 30 MIN: CPT | Performed by: NURSE PRACTITIONER

## 2023-01-11 NOTE — LETTER
2023     Fide Shelton MD  [address] [facility] [id number]    Patient: Ynes Flores   YOB: 1985   Date of Visit: 2023     Dear Dr. Cat MD:    Thank you for referring Ynes Flores to me for evaluation. Below are the relevant portions of my assessment and plan of care.    If you have questions, please do not hesitate to call me. I look forward to following Ynes along with you.         Sincerely,        LIEN Vela        CC: No Recipients    MATERNAL FETAL MEDICINE - TELEHEALTH ENCOUNTER       Dear Dr. Shelton,     This is a followup visit for diabetes management.      You have chosen to receive care through a telehealth visit.  Do you consent to use a video/audio connection for your medical care today? The patient replied yes.     Thank you for requesting my service to provide consultation for Ynes Flores is a 37 y.o.   At 34+ 0/7 weeks gestation (Estimated Date of Delivery: 23).   She is being seen for:    AMA  GDMA2  (Diabetes Management)  BMI 36    Today, she denies headache, blurry vision, RUQ pain. No vaginal bleeding, no contractions and endorses positive fetal movement.      Chief complaint    ICD-10-CM ICD-9-CM   1. Insulin controlled gestational diabetes mellitus (GDM) in third trimester  O24.414 648.83   2. AMA (advanced maternal age) multigravida 35+, second trimester  O09.522 659.63   3. Increased BMI  R63.8 783.9       Denies headache, blurry vision, RUQ pain. No vaginal bleeding, no contractions.     Past obstetric, gynecological, medical, surgical, family and social history reviewed; no changes made.     Review of systems (All denied by pt via telehealth conversation)  Constitutional:  No Fever, No Chills, No Fatigue, No Malaise, AOX4  Eyes:  No Eye Pain, No Vision Changes  Cardiovascular:  No Chest Pain, No SOB, No Palpitations  Respiratory:  No Cough, No Wheezing, No Dyspnea      There were no vitals filed for this visit.      ULTRASOUND   Scheduled 2023 at [facility]       ASSESSMENT   37 y.o.   at 34+ 4/7 weeks gestation (Estimated Date of Delivery: 23), reassuring fetal and maternal status.       1. Insulin controlled gestational diabetes mellitus (GDM) during pregnancy, antepartum (Primary)  -     [facility]; Future  -Pt currently taking NPH      Other orders  -     metFORMIN ER (GLUCOPHAGE-XR) 500 MG 24 hr tablet; Take 1 tablet by mouth Daily With Breakfast.  Dispense: 60 tablet; Refill: 5        Reviewed blood sugar log in the office today.  Pt doing well.  Will go up slightly on NPH:  .  Warned pt about hypoglycemia and what to do if she has lows.  Pt has pills and keeps OJ in pantry to safeguard.  Pt further aware that if she is going to cheat on any snack, if may be her nighttime snack.       Gestational Diabetes  [ X ] stable  [   ] improving [  ] worsening     Summary of Plan  - testing weekly with primary OB and weekly diabetes management with MFM  -Weekly fetal  surveillance until delivery at OB office.  If anything appears out of normal range, pt to be rescheduled with MFM.    -Will send in sugars weekly as she has been well-controlled overall and will see pt via Telehealth as necessary.    -Medication Regime:  Metformin 1000 BID; NPH   -Will see again at [facility] at 36 weeks for growth U/S with BPP.    -Fetal movement instructions given continue daily until delivery; instructed to report to labor and delivery if cannot achieve more than 10 kicks in one hour or if she perceives a decrease in fetal movement     Follow-up:  In one wk for telehealth visit for diabetes management and at 36 weeks for growth but am always available at request of primary OB if anything arises outside of normal.        I have discussed over telehealth/video visit with patient, the management above,  for > 15 minutes. All patient questions were answered, and patient concerns addressed.   This note has been routed to the referring obstetricians/medical provider. Thank you for requesting this clinical consult.       LIEN Smith, FN-BC  Maternal Fetal Medicine-[address], [address]   [address]  Office: [phone]

## 2023-01-17 ENCOUNTER — ROUTINE PRENATAL (OUTPATIENT)
Dept: OBSTETRICS AND GYNECOLOGY | Age: 38
End: 2023-01-17
Payer: COMMERCIAL

## 2023-01-17 VITALS — BODY MASS INDEX: 36.46 KG/M2 | WEIGHT: 212.4 LBS | SYSTOLIC BLOOD PRESSURE: 108 MMHG | DIASTOLIC BLOOD PRESSURE: 82 MMHG

## 2023-01-17 DIAGNOSIS — O24.414 INSULIN CONTROLLED GESTATIONAL DIABETES MELLITUS (GDM) IN SECOND TRIMESTER: ICD-10-CM

## 2023-01-17 DIAGNOSIS — Z3A.34 34 WEEKS GESTATION OF PREGNANCY: Primary | ICD-10-CM

## 2023-01-17 DIAGNOSIS — O09.521 MULTIGRAVIDA OF ADVANCED MATERNAL AGE IN FIRST TRIMESTER: ICD-10-CM

## 2023-01-17 LAB
GLUCOSE UR STRIP-MCNC: NEGATIVE MG/DL
PROT UR STRIP-MCNC: NEGATIVE MG/DL

## 2023-01-17 PROCEDURE — 0502F SUBSEQUENT PRENATAL CARE: CPT | Performed by: OBSTETRICS & GYNECOLOGY

## 2023-01-17 NOTE — PROGRESS NOTES
Pt presents for routine visit and u/s. She notes active fetal movement and denies bleeding/ctx or leaking fluid    BPP 8/8, breech, larisa 12.6    A/p: Breech pres: discussed with pt, will monitor. Reviewed options of ECV vs c/s if remains breech. Will follow.     GDM/AMA: reviewed glucose log. Fasting values are all normal, pp values with occ evening elevations but overall in range. [facility] is managing her medication. She is currently on metformin twice daily and taking NPH insulin 52 units in AM, 37 units in PM. Continue daily fmc's and weekly BPP. Next growth u/s is with MFM next week.

## 2023-01-18 ENCOUNTER — OFFICE VISIT (OUTPATIENT)
Dept: OBSTETRICS AND GYNECOLOGY | Facility: CLINIC | Age: 38
End: 2023-01-18
Payer: COMMERCIAL

## 2023-01-18 DIAGNOSIS — O24.414 INSULIN CONTROLLED GESTATIONAL DIABETES MELLITUS (GDM) IN THIRD TRIMESTER: Primary | ICD-10-CM

## 2023-01-18 DIAGNOSIS — Z3A.34 34 WEEKS GESTATION OF PREGNANCY: ICD-10-CM

## 2023-01-18 DIAGNOSIS — O09.522 AMA (ADVANCED MATERNAL AGE) MULTIGRAVIDA 35+, SECOND TRIMESTER: ICD-10-CM

## 2023-01-18 PROCEDURE — 99214 OFFICE O/P EST MOD 30 MIN: CPT | Performed by: NURSE PRACTITIONER

## 2023-01-18 NOTE — LETTER
2023     Fide Shelton MD  [address] [facility] [address]    Patient: Ynes Flores   YOB: 1985   Date of Visit: 2023     Dear Dr. Cat MD:    Thank you for referring Ynes Flores to me for evaluation. Below are the relevant portions of my assessment and plan of care.    If you have questions, please do not hesitate to call me. I look forward to following Ynes along with you.         Sincerely,        LIEN Vela        CC: No Recipients    Progress Notes:  MATERNAL FETAL MEDICINE - TELEHEALTH  ENCOUNTER       Dear Dr. Shelton:      This is a Follow-up visit.     You have chosen to receive care through a telehealth visit.  Do you consent to use a video/audio connection for your medical care today? The patient replied yes.     Thank you for requesting my service to provide consultation for Ynes Flores is a 37 y.o.   at  35  0/7 weeks gestation (Estimated Date of Delivery: 23).   She is being seen for: Diabetes Management    Today, she denies headache, blurry vision, RUQ pain. No vaginal bleeding, no contractions and states she has been feeling her baby move.      Chief complaint    ICD-10-CM ICD-9-CM   1. Insulin controlled gestational diabetes mellitus (GDM) in third trimester  O24.414 648.83   2. 34 weeks gestation of pregnancy  Z3A.34 V22.2   3. AMA (advanced maternal age) multigravida 35+, second trimester  O09.522 659.63         Denies headache, blurry vision, RUQ pain. No vaginal bleeding, no contractions.     Past obstetric, gynecological, medical, surgical, family and social history reviewed; no changes made.     Review of systems  Constitutional:  No Weight Change, No Fever, No Chills, No Fatigue, No Malaise  ENT/Mouth:  No Hearing Changes, No Sinus Pain, No Hoarseness, No sore throat, No   Eyes:  No Eye Pain, No Vision Changes  Cardiovascular:  No Chest Pain, No SOB, No Palpitations  Respiratory:  No Cough, No Wheezing, No  Dyspnea  Gastrointestinal:  No Nausea, No Vomiting, No Diarrhea, No Constipation, No Pain   Genitourinary:   No Dysuria, No Urinary Frequency, No Hematuria, No Flank Pain  Musculoskeletal:  No Arthralgias, No Myalgias,   Skin:  No Skin Lesions, No Pruritis,   Neuro:  No Weakness, No Numbness, No Loss of Consciousness, No Syncope  Psych:  No Memory Changes, No Violence/Abuse Hx., No Eating Concerns  Heme/Lymph:  No Bruising, No Bleeding  Endocrine:   No Temperature Intolerance    There were no vitals filed for this visit.     ULTRASOUND   No u/s on this visit.  Scheduled 2023 at [facility].      ASSESSMENT   37 y.o.   at 35 0/7 weeks gestation (Estimated Date of Delivery: 23), reassuring fetal and maternal status.     1. Single live intrauterine pregnancy   [ X ] stable  [   ] improving [  ] worsening      1. Insulin controlled gestational diabetes mellitus (GDM) during pregnancy, antepartum (Primary)  -     [facility]; Future  -Pt currently taking NPH 52/30     Other orders  -     metFORMIN ER (GLUCOPHAGE-XR) 500 MG 24 hr tablet; Take 1 tablet by mouth Daily With Breakfast.  Dispense: 60 tablet; Refill: 5         Reviewed blood sugar log that the pt sent in through [employer].  Pt doing well.  We reviewed her log together and discussed the lowest blood sugars that she had had which were in the 70's.  Pt states she has not had any symptoms and feels fine with these.  Will go up slightly on NPH:  57/40 and Metformin 1000mg BID.  Continued to warn her about hypoglycemia and what to do if she has lows.  Pt has pills and keeps OJ in pantry to safeguard.  Pt further aware that if she is going to cheat on any snack, if may be her nighttime snack.      Pt states she has been doing kick counts at home and even had an u/s at her OB office yesterday.       Gestational Diabetes  [ X ] stable  [   ] improving [  ] worsening     Summary of Plan  - testing weekly with primary OB and weekly  diabetes management with MFM  -Weekly fetal  surveillance until delivery at OB office.  If anything appears out of normal range, pt to be rescheduled with MFM.    -Will send in sugars weekly as she has been well-controlled overall and will see pt via Telehealth as necessary.  (Will bring to next (next Friday) visit rather than sending them in next week.    -Medication Regime:  Metformin 1000 BID; NPH 55/40  -Will see again at MFM at 36 weeks for growth U/S with BPP.    -Fetal movement instructions given continue daily until delivery; instructed to report to labor and delivery if cannot achieve more than 10 kicks in one hour or if she perceives a decrease in fetal movement     Follow-up:  In one wk for telehealth visit for diabetes management and at 36 weeks for growth but am always available at request of primary OB if anything arises outside of normal.                I have discussed over telehealth/video visit with patient, the management above,  for > 15 minutes. All patient questions were answered, and patient concerns addressed.  This note has been routed to the referring obstetricians/medical provider. Thank you for requesting this clinical consult.         LIEN Smith, FNP-BC  Maternal Fetal Medicine-[facility]   [address] [place] [address], [address]  [place] [address]  Office: [phone]

## 2023-01-18 NOTE — PROGRESS NOTES
MATERNAL FETAL MEDICINE - TELEHEALTH  ENCOUNTER       Dear Dr. Shelton:      This is a Follow-up visit.     You have chosen to receive care through a telehealth visit.  Do you consent to use a video/audio connection for your medical care today? The patient replied yes.     Thank you for requesting my service to provide consultation for Ynes Flores is a 37 y.o.   at  35  0/7 weeks gestation (Estimated Date of Delivery: 23).   She is being seen for: Diabetes Management    Today, she denies headache, blurry vision, RUQ pain. No vaginal bleeding, no contractions and states she has been feeling her baby move.      Chief complaint    ICD-10-CM ICD-9-CM   1. Insulin controlled gestational diabetes mellitus (GDM) in third trimester  O24.414 648.83   2. 34 weeks gestation of pregnancy  Z3A.34 V22.2   3. AMA (advanced maternal age) multigravida 35+, second trimester  O09.522 659.63         Denies headache, blurry vision, RUQ pain. No vaginal bleeding, no contractions.     Past obstetric, gynecological, medical, surgical, family and social history reviewed; no changes made.     Review of systems  Constitutional:  No Weight Change, No Fever, No Chills, No Fatigue, No Malaise  ENT/Mouth:  No Hearing Changes, No Sinus Pain, No Hoarseness, No sore throat, No   Eyes:  No Eye Pain, No Vision Changes  Cardiovascular:  No Chest Pain, No SOB, No Palpitations  Respiratory:  No Cough, No Wheezing, No Dyspnea  Gastrointestinal:  No Nausea, No Vomiting, No Diarrhea, No Constipation, No Pain   Genitourinary:   No Dysuria, No Urinary Frequency, No Hematuria, No Flank Pain  Musculoskeletal:  No Arthralgias, No Myalgias,   Skin:  No Skin Lesions, No Pruritis,   Neuro:  No Weakness, No Numbness, No Loss of Consciousness, No Syncope  Psych:  No Memory Changes, No Violence/Abuse Hx., No Eating Concerns  Heme/Lymph:  No Bruising, No Bleeding  Endocrine:   No Temperature Intolerance    There were no vitals filed for this visit.      ULTRASOUND   No u/s on this visit.  Scheduled 2023 at [facility].      ASSESSMENT   37 y.o.   at 35 0/7 weeks gestation (Estimated Date of Delivery: 23), reassuring fetal and maternal status.     1. Single live intrauterine pregnancy   [ X ] stable  [   ] improving [  ] worsening      1. Insulin controlled gestational diabetes mellitus (GDM) during pregnancy, antepartum (Primary)  -     [facility]; Future  -Pt currently taking NPH 52/30     Other orders  -     metFORMIN ER (GLUCOPHAGE-XR) 500 MG 24 hr tablet; Take 1 tablet by mouth Daily With Breakfast.  Dispense: 60 tablet; Refill: 5         Reviewed blood sugar log that the pt sent in through [employer].  Pt doing well.  We reviewed her log together and discussed the lowest blood sugars that she had had which were in the 70's.  Pt states she has not had any symptoms and feels fine with these.  Will go up slightly on NPH:  57/40 and Metformin 1000mg BID.  Continued to warn her about hypoglycemia and what to do if she has lows.  Pt has pills and keeps OJ in pantry to safeguard.  Pt further aware that if she is going to cheat on any snack, if may be her nighttime snack.      Pt states she has been doing kick counts at home and even had an u/s at her OB office yesterday.       Gestational Diabetes  [ X ] stable  [   ] improving [  ] worsening     Summary of Plan  - testing weekly with primary OB and weekly diabetes management with MFM  -Weekly fetal  surveillance until delivery at OB office.  If anything appears out of normal range, pt to be rescheduled with MFM.    -Will send in sugars weekly as she has been well-controlled overall and will see pt via Telehealth as necessary.  (Will bring to next (next Friday) visit rather than sending them in next week.    -Medication Regime:  Metformin 1000 BID; NPH 55/40  -Will see again at [facility] at 36 weeks for growth U/S with BPP.    -Fetal movement instructions given continue  daily until delivery; instructed to report to labor and delivery if cannot achieve more than 10 kicks in one hour or if she perceives a decrease in fetal movement     Follow-up:  In one wk for telehealth visit for diabetes management and at 36 weeks for growth but am always available at request of primary OB if anything arises outside of normal.                I have discussed over telehealth/video visit with patient, the management above,  for > 15 minutes. All patient questions were answered, and patient concerns addressed.  This note has been routed to the referring obstetricians/medical provider. Thank you for requesting this clinical consult.         LIEN Smith, FNP-BC  Maternal Fetal Medicine-[address], [address]  [place] [address]  Office: [phone]

## 2023-01-24 ENCOUNTER — OFFICE VISIT (OUTPATIENT)
Dept: OBSTETRICS AND GYNECOLOGY | Facility: CLINIC | Age: 38
End: 2023-01-24
Payer: COMMERCIAL

## 2023-01-24 ENCOUNTER — HOSPITAL ENCOUNTER (OUTPATIENT)
Dept: ULTRASOUND IMAGING | Facility: HOSPITAL | Age: 38
Discharge: HOME | End: 2023-01-24
Admitting: PHYSICIAN ASSISTANT
Payer: COMMERCIAL

## 2023-01-24 ENCOUNTER — ROUTINE PRENATAL (OUTPATIENT)
Dept: OBSTETRICS AND GYNECOLOGY | Age: 38
End: 2023-01-24
Payer: COMMERCIAL

## 2023-01-24 VITALS — SYSTOLIC BLOOD PRESSURE: 122 MMHG | DIASTOLIC BLOOD PRESSURE: 70 MMHG | BODY MASS INDEX: 36.22 KG/M2 | WEIGHT: 211 LBS

## 2023-01-24 VITALS — HEART RATE: 92 BPM | SYSTOLIC BLOOD PRESSURE: 118 MMHG | DIASTOLIC BLOOD PRESSURE: 72 MMHG | TEMPERATURE: 98 F

## 2023-01-24 DIAGNOSIS — O36.5930 POOR FETAL GROWTH AFFECTING MANAGEMENT OF MOTHER IN THIRD TRIMESTER, SINGLE OR UNSPECIFIED FETUS: ICD-10-CM

## 2023-01-24 DIAGNOSIS — O32.2XX0 TRANSVERSE PRESENTATION, ANTEPARTUM, SINGLE OR UNSPECIFIED FETUS: ICD-10-CM

## 2023-01-24 DIAGNOSIS — Z36.85 ANTENATAL SCREENING FOR STREPTOCOCCUS B: ICD-10-CM

## 2023-01-24 DIAGNOSIS — O24.414 INSULIN CONTROLLED GESTATIONAL DIABETES MELLITUS (GDM) IN SECOND TRIMESTER: Primary | ICD-10-CM

## 2023-01-24 DIAGNOSIS — Z3A.35 35 WEEKS GESTATION OF PREGNANCY: Primary | ICD-10-CM

## 2023-01-24 DIAGNOSIS — O09.521 MULTIGRAVIDA OF ADVANCED MATERNAL AGE IN FIRST TRIMESTER: ICD-10-CM

## 2023-01-24 DIAGNOSIS — O24.414 INSULIN CONTROLLED GESTATIONAL DIABETES MELLITUS (GDM) IN SECOND TRIMESTER: ICD-10-CM

## 2023-01-24 DIAGNOSIS — O09.522 AMA (ADVANCED MATERNAL AGE) MULTIGRAVIDA 35+, SECOND TRIMESTER: ICD-10-CM

## 2023-01-24 LAB
GLUCOSE BLDC GLUCOMTR-MCNC: 71 MG/DL (ref 70–130)
GLUCOSE UR STRIP-MCNC: NEGATIVE MG/DL
PROT UR STRIP-MCNC: NEGATIVE MG/DL

## 2023-01-24 PROCEDURE — 0502F SUBSEQUENT PRENATAL CARE: CPT | Performed by: OBSTETRICS & GYNECOLOGY

## 2023-01-24 PROCEDURE — 99214 OFFICE O/P EST MOD 30 MIN: CPT | Performed by: NURSE PRACTITIONER

## 2023-01-24 PROCEDURE — 76816 OB US FOLLOW-UP PER FETUS: CPT

## 2023-01-24 PROCEDURE — 76820 UMBILICAL ARTERY ECHO: CPT

## 2023-01-24 PROCEDURE — 82962 GLUCOSE BLOOD TEST: CPT

## 2023-01-24 PROCEDURE — 76819 FETAL BIOPHYS PROFIL W/O NST: CPT

## 2023-01-24 NOTE — PROGRESS NOTES
MATERNAL FETAL MEDICINE Follow up Note    Dear Dr Fide Shelton MD:    Thank you for your kind referral of Ynes Flores.  As you know, she is a 37 y.o.   at 35+ 6 /7 weeks gestation (Estimated Date of Delivery: 23). This is a Follow up.    Her antepartum course is complicated by:  AMA  GDMA2  Obese BMI 36    Aneuploidy Screening: declined    HPI: Today, she denies headache, blurry vision, RUQ pain. No vaginal bleeding, no contractions and endorses positive fetal movement.      Review of History:  Past Medical History:   Diagnosis Date   • Diabetes mellitus (HCC)    • Gestational diabetes    • Placenta previa 2020    Resolved this pregnancy   • Vaginal delivery      Past Surgical History:   Procedure Laterality Date   • WISDOM TOOTH EXTRACTION         Social History     Socioeconomic History   • Marital status:    Tobacco Use   • Smoking status: Never   • Smokeless tobacco: Never   Vaping Use   • Vaping Use: Never used   Substance and Sexual Activity   • Alcohol use: No   • Drug use: No   • Sexual activity: Yes     Partners: Male     Birth control/protection: None     Family History   Problem Relation Age of Onset   • Thyroid cancer Mother    • Diabetes Mother    • Colon cancer Maternal Uncle    • Skin cancer Maternal Grandmother    • Ovarian cancer Grandchild    • Ovarian cancer Grandchild    • Birth defects Neg Hx       Allergies   Allergen Reactions   • Amoxicillin Hives   • Cefdinir Hives   • Penicillins Hives      Current Outpatient Medications on File Prior to Visit   Medication Sig Dispense Refill   • Alcohol Swabs pads Please substitute as insurance requires.  Use with lancets for checking blood sugars 250 each 4   • cetirizine (zyrTEC) 10 MG tablet Take 10 mg by mouth Daily.     • glucose 4-6 GM-MG per chewable tablet      • glucose blood test strip Use as instructed 250 each 12   • insulin NPH (HumuLIN N) 100 UNIT/ML injection Inject 50 Units under the skin into the  "appropriate area as directed 2 (Two) Times a Day Before Meals. Take 15 u in the AM and 10 in PM before bed, variable dose up to 50u--to be adjusted by physician on an ongoing basis. 20 mL 12   • Insulin Syringe-Needle U-100 31G X 5/16\" 1 ML misc Use as directed with NPH insulin 300 each 5   • Iron, Ferrous Sulfate, 325 (65 Fe) MG tablet      • Lancets 30G misc 1 Device 4 (Four) Times a Day. 120 each 11   • metFORMIN ER (GLUCOPHAGE-XR) 500 MG 24 hr tablet Take 2 tablets by mouth 2 (Two) Times a Day. 60 tablet 5   • Prenatal Vit-Fe Sulfate-FA-DHA (Prenatal Vitamin/Min +DHA) 27-0.8-200 MG capsule Take 1 tablet by mouth Daily. And 1 capsule daily 60 capsule 11   • cetirizine (zyrTEC) 10 MG tablet Take 1 tablet by mouth Daily.     • glucose blood test strip Use as instructed to test 4 times daily 250 each 12   • Nutritional Supplements (Glucose Management) tablet Take 4 g by mouth As Needed (hypoglycemia sugar <60). 50 tablet 2   • [DISCONTINUED] aspirin 81 MG EC tablet Take 81 mg by mouth Daily.       No current facility-administered medications on file prior to visit.        Past obstetric, gynecological, medical, surgical, family and social history reviewed.  Relevant lab work and imaging reviewed.    Review of systems  Constitutional:  denies fever, chills, malaise.   ENT/Mouth:  denies sore throat, tinnitis  Eyes: denies vision changes/pain  CV:  denies chest pain  Respiratory:  denies cough/SOB  GI:  denies N/V, diarrhea, abdominal pain.    :   denies dysuria  Skin:  denies lesions or pruritis   Neuro:  denies weakness, focal neurologic symptoms    Vitals:    01/24/23 1158   BP: 118/72   BP Location: Right arm   Patient Position: Sitting   Pulse: 92   Temp: 98 °F (36.7 °C)   TempSrc: Temporal       PHYSICAL EXAM   GENERAL: Not in acute distress, AAOx3, pleasant  CARDIO: regular rate and rhythm  PULM: symmetric chest rise, speaking in complete sentences without difficulty  NEURO: awake, alert and oriented to " person, place, and time  ABDOMINAL: No fundal tenderness, no rebound or guarding, gravid  EXTREMITIES: no bilateral lower extremity edema/tenderness  SKIN: Warm, well-perfused      ULTRASOUND   Please view full ultrasound note on Imaging tab in ViewPoint.  Reviewed and signed by Dr. Nohelia Magana      Biophysical Profile  ===============     2: Fetal breathing movements  2: Gross body movements  2: Fetal tone  2: Amniotic fluid volume  /8 Biophysical profile score        Impression  =========     Transverse presentation.  Posterior placenta.  ROSALEE 15 cm, which is normal.  EFW 2364 g (18%, AC 17%)  BPP 8/8  Umbilical artery doppler study normal.        Recommendation  ===============     Continue weekly DM surveillance here. Will do follow up growth in 2-3 weeks.    ASSESSMENT/COUNSELIN y.o.   at 27 5 /7 weeks gestation (Estimated Date of Delivery: 23).      Diagnoses and all orders for this visit:    1. Insulin controlled gestational diabetes mellitus (GDM) in second trimester (Primary)  -     [facility]; Future    2. AMA (advanced maternal age) multigravida 35+, second trimester    3. Poor fetal growth affecting management of mother in third trimester, single or unspecified fetus        Gestational Diabetes   [  ]  well-controlled  [  ] good-control  [  ] fair-control [  ] poorly-controlled  [  ] non-compliant  [ X ] stable  [   ] improving [  ] worsening    -Diabetes counseling today -  morbidity associated with diabetes in pregnancy reviewed, including but not limited to: spontaneous , errors in organogenesis including increased risk of heart and skeletal defects, fetal macrosomia,  hypoglycemia, shoulder dystocia, increased risk of preeclampsia.   -Reviewed goals of fasting 60 - 90 and 1 hour postprandial< 120 throughout pregnancy.  -Counseled regarding risk of hypoglycemia (maternal and fetal risks)   -Blood sugar log reviewed and pt is in  excellent control.  Will adjust insulin slightly.  New regime to be:  Metformin 1000 BID; NPH 55/44      Advanced Maternal Age  [ X ] stable  [   ] improving [  ] worsening    Patient declined genetic screening previously.  She has a normal anatomy.      Maternal age greater than 35  has been associated with placental insufficiency with increased risk of fetal growth disorder, stillbirth, hypertensive disorders. Recommend fetal growth surveillance.     Summary of Plan  - testing weekly with primary OB and weekly diabetes management with MFM  -Weekly fetal  surveillance until delivery at OB office () and weekly at MFM on  for  surveillance and diabetes management.    -Will bring in blood sugar log weekly as she has been well-controlled. Will bring to next (next Friday).    -Medication Regime:  Metformin 1000 BID; NPH 55/44  -Fetal movement instructions given continue daily until delivery; instructed to report to labor and delivery if cannot achieve more than 10 kicks in one hour or if she perceives a decrease in fetal movement  -Recommend delivery after 39wks unless otherwise indicated    Follow-up:  February 3, 2023 for growth, BPP, and Diabetic Management with Dr. Nohelia Magana.      Thank you for the consult and opportunity to care for this patient.  Please feel free to reach out with any questions or concerns.      I spent 30 minutes caring for this patient on this date of service. This time includes time spent by me in the following activities: preparing for the visit, reviewing tests, obtaining and/or reviewing a separately obtained history, performing a medically appropriate examination and/or evaluation, counseling and educating the patient/family/caregiver and independently interpreting results and communicating that information with the patient/family/caregiver with greater than 50% spent in counseling and coordination of care.     Corry England, APRN,  [facility]  Maternal Fetal MedicineEmily Ville [id number] [facility], [address]  [place] [address]  Office: [phone]

## 2023-01-24 NOTE — LETTER
January 24, 2023     Fide Shelton MD  [address] [facility] [address]    Patient: Ynes Flores   YOB: 1985   Date of Visit: 1/24/2023       Dear Fide Shelton MD,    Thank you for referring Ynes Flores to me for evaluation. Below is a copy of my consult note.    If you have questions, please do not hesitate to call me. I look forward to following Ynes along with you.         Sincerely,        Nohelia Magana MD        CC: No Recipients    No notes on file

## 2023-01-24 NOTE — PROGRESS NOTES
Pt sent from OB office with concerns of IUGR. Pt reports that she is doing well and denies vaginal bleeding, cramping, contractions or LOF at this time. Reports active fetal movement. Denies HA, visual changes or epigastric pain. Blood sugar logs on chart for MFM to review. Blood sugar taken at appointment with a value of 71 noted. Next OB appointment scheduled for 1/31.    Vitals:    01/24/23 1158   BP: 118/72   Pulse: 92   Temp: 98 °F (36.7 °C)

## 2023-01-24 NOTE — LETTER
2023     Fide Shelton MD  [address] [facility] [address]    Patient: Ynes Flores   YOB: 1985   Date of Visit: 2023     Dear Dr. Cat MD:    Thank you for referring Ynes Flores to me for evaluation. Below are the relevant portions of my assessment and plan of care.    If you have questions, please do not hesitate to call me. I look forward to following Ynes along with you.         Sincerely,        LIEN Vela        CC: No Recipients    MATERNAL FETAL MEDICINE Follow up Note    Dear Dr Fide Shelton MD:    Thank you for your kind referral of Ynes Flores.  As you know, she is a 37 y.o.   at 35+ 6 /7 weeks gestation (Estimated Date of Delivery: 23). This is a Follow up.    Her antepartum course is complicated by:  AMA  GDMA2  Obese BMI 36    Aneuploidy Screening: declined    HPI: Today, she denies headache, blurry vision, RUQ pain. No vaginal bleeding, no contractions and endorses positive fetal movement.      Review of History:  Past Medical History:   Diagnosis Date   • Diabetes mellitus (HCC)    • Gestational diabetes    • Placenta previa 2020    Resolved this pregnancy   • Vaginal delivery      Past Surgical History:   Procedure Laterality Date   • WISDOM TOOTH EXTRACTION         Social History     Socioeconomic History   • Marital status:    Tobacco Use   • Smoking status: Never   • Smokeless tobacco: Never   Vaping Use   • Vaping Use: Never used   Substance and Sexual Activity   • Alcohol use: No   • Drug use: No   • Sexual activity: Yes     Partners: Male     Birth control/protection: None     Family History   Problem Relation Age of Onset   • Thyroid cancer Mother    • Diabetes Mother    • Colon cancer Maternal Uncle    • Skin cancer Maternal Grandmother    • Ovarian cancer Grandchild    • Ovarian cancer Grandchild    • Birth defects Neg Hx       Allergies   Allergen Reactions   • Amoxicillin Hives   • Cefdinir  "Hives   • Penicillins Hives      Current Outpatient Medications on File Prior to Visit   Medication Sig Dispense Refill   • Alcohol Swabs pads Please substitute as insurance requires.  Use with lancets for checking blood sugars 250 each 4   • cetirizine (zyrTEC) 10 MG tablet Take 10 mg by mouth Daily.     • glucose 4-6 GM-MG per chewable tablet      • glucose blood test strip Use as instructed 250 each 12   • insulin NPH (HumuLIN N) 100 UNIT/ML injection Inject 50 Units under the skin into the appropriate area as directed 2 (Two) Times a Day Before Meals. Take 15 u in the AM and 10 in PM before bed, variable dose up to 50u--to be adjusted by physician on an ongoing basis. 20 mL 12   • Insulin Syringe-Needle U-100 31G X 5/16\" 1 ML misc Use as directed with NPH insulin 300 each 5   • Iron, Ferrous Sulfate, 325 (65 Fe) MG tablet      • Lancets 30G misc 1 Device 4 (Four) Times a Day. 120 each 11   • metFORMIN ER (GLUCOPHAGE-XR) 500 MG 24 hr tablet Take 2 tablets by mouth 2 (Two) Times a Day. 60 tablet 5   • Prenatal Vit-Fe Sulfate-FA-DHA (Prenatal Vitamin/Min +DHA) 27-0.8-200 MG capsule Take 1 tablet by mouth Daily. And 1 capsule daily 60 capsule 11   • cetirizine (zyrTEC) 10 MG tablet Take 1 tablet by mouth Daily.     • glucose blood test strip Use as instructed to test 4 times daily 250 each 12   • Nutritional Supplements (Glucose Management) tablet Take 4 g by mouth As Needed (hypoglycemia sugar <60). 50 tablet 2   • [DISCONTINUED] aspirin 81 MG EC tablet Take 81 mg by mouth Daily.       No current facility-administered medications on file prior to visit.        Past obstetric, gynecological, medical, surgical, family and social history reviewed.  Relevant lab work and imaging reviewed.    Review of systems  Constitutional:  denies fever, chills, malaise.   ENT/Mouth:  denies sore throat, tinnitis  Eyes: denies vision changes/pain  CV:  denies chest pain  Respiratory:  denies cough/SOB  GI:  denies N/V, diarrhea, " abdominal pain.    :   denies dysuria  Skin:  denies lesions or pruritis   Neuro:  denies weakness, focal neurologic symptoms    Vitals:    23 1158   BP: 118/72   BP Location: Right arm   Patient Position: Sitting   Pulse: 92   Temp: 98 °F (36.7 °C)   TempSrc: Temporal       PHYSICAL EXAM   GENERAL: Not in acute distress, AAOx3, pleasant  CARDIO: regular rate and rhythm  PULM: symmetric chest rise, speaking in complete sentences without difficulty  NEURO: awake, alert and oriented to person, place, and time  ABDOMINAL: No fundal tenderness, no rebound or guarding, gravid  EXTREMITIES: no bilateral lower extremity edema/tenderness  SKIN: Warm, well-perfused      ULTRASOUND   Please view full ultrasound note on Imaging tab in ViewPoint.  Reviewed and signed by Dr. Nohelia Magana      Biophysical Profile  ===============     2: Fetal breathing movements  2: Gross body movements  2: Fetal tone  2: Amniotic fluid volume  8/8 Biophysical profile score        Impression  =========     Transverse presentation.  Posterior placenta.  ROSALEE 15 cm, which is normal.  EFW 2364 g (18%, AC 17%)  BPP 8/8  Umbilical artery doppler study normal.        Recommendation  ===============     Continue weekly DM surveillance here. Will do follow up growth in 2-3 weeks.    ASSESSMENT/COUNSELIN y.o.   at 27 5 /7 weeks gestation (Estimated Date of Delivery: 23).      Diagnoses and all orders for this visit:    1. Insulin controlled gestational diabetes mellitus (GDM) in second trimester (Primary)  -     [facility]; Future    2. AMA (advanced maternal age) multigravida 35+, second trimester    3. Poor fetal growth affecting management of mother in third trimester, single or unspecified fetus        Gestational Diabetes   [  ]  well-controlled  [  ] good-control  [  ] fair-control [  ] poorly-controlled  [  ] non-compliant  [ X ] stable  [   ] improving [  ] worsening    -Diabetes counseling  today -  morbidity associated with diabetes in pregnancy reviewed, including but not limited to: spontaneous , errors in organogenesis including increased risk of heart and skeletal defects, fetal macrosomia,  hypoglycemia, shoulder dystocia, increased risk of preeclampsia.   -Reviewed goals of fasting 60 - 90 and 1 hour postprandial< 120 throughout pregnancy.  -Counseled regarding risk of hypoglycemia (maternal and fetal risks)   -Blood sugar log reviewed and pt is in excellent control.  Will adjust insulin slightly.  New regime to be:  Metformin 1000 BID; NPH 55/44      Advanced Maternal Age  [ X ] stable  [   ] improving [  ] worsening    Patient declined genetic screening previously.  She has a normal anatomy.      Maternal age greater than 35  has been associated with placental insufficiency with increased risk of fetal growth disorder, stillbirth, hypertensive disorders. Recommend fetal growth surveillance.     Summary of Plan  - testing weekly with primary OB and weekly diabetes management with MFM  -Weekly fetal  surveillance until delivery at OB office () and weekly at MFM on  for  surveillance and diabetes management.    -Will bring in blood sugar log weekly as she has been well-controlled. Will bring to next (next Friday).    -Medication Regime:  Metformin 1000 BID; NPH 55/44  -Fetal movement instructions given continue daily until delivery; instructed to report to labor and delivery if cannot achieve more than 10 kicks in one hour or if she perceives a decrease in fetal movement  -Recommend delivery after 39wks unless otherwise indicated    Follow-up:  February 3, 2023 for growth, BPP, and Diabetic Management with Dr. Nohelia Magana.      Thank you for the consult and opportunity to care for this patient.  Please feel free to reach out with any questions or concerns.      I spent 30 minutes caring for this patient on this date of service.  This time includes time spent by me in the following activities: preparing for the visit, reviewing tests, obtaining and/or reviewing a separately obtained history, performing a medically appropriate examination and/or evaluation, counseling and educating the patient/family/caregiver and independently interpreting results and communicating that information with the patient/family/caregiver with greater than 50% spent in counseling and coordination of care.     LIEN Smith, [facility]-BC  Maternal Fetal Medicine-[address], [address]  [place] [address]  Office: [phone]

## 2023-01-24 NOTE — PROGRESS NOTES
Pt presents for routine visit. She denies any issues today. Notes active fetal movement and denies ctx/bleeding/leaking fuid.     O: u/s: growth at 18 %, ac 9.7 %, larisa 7 to 10; BPP 8/8    IUGR: per AC measurements today. Contacted MFM and they will see pt today    Transverse lie with breech prior: reviewed with pt. She is aware c/s will be needed if spontaneous labor with non-vertex presentation. Pt notes understanding    GDM: reviewed glucose log and values are overall very good. MFM is managing insulin/metformin.     AMA: advised daily fmc's. Weekly BPP planned but will await MFM input for ongoing sureillance

## 2023-01-26 LAB — GP B STREP DNA SPEC QL NAA+PROBE: NEGATIVE

## 2023-01-31 ENCOUNTER — ROUTINE PRENATAL (OUTPATIENT)
Dept: OBSTETRICS AND GYNECOLOGY | Age: 38
End: 2023-01-31
Payer: COMMERCIAL

## 2023-01-31 VITALS — WEIGHT: 212 LBS | DIASTOLIC BLOOD PRESSURE: 74 MMHG | SYSTOLIC BLOOD PRESSURE: 118 MMHG | BODY MASS INDEX: 36.39 KG/M2

## 2023-01-31 DIAGNOSIS — Z87.59 HISTORY OF PRIOR PREGNANCY WITH IUGR NEWBORN: ICD-10-CM

## 2023-01-31 DIAGNOSIS — Z3A.36 36 WEEKS GESTATION OF PREGNANCY: ICD-10-CM

## 2023-01-31 DIAGNOSIS — Z13.89 SCREENING FOR BLOOD OR PROTEIN IN URINE: Primary | ICD-10-CM

## 2023-01-31 DIAGNOSIS — R63.8 INCREASED BMI: ICD-10-CM

## 2023-01-31 DIAGNOSIS — O24.414 INSULIN CONTROLLED GESTATIONAL DIABETES MELLITUS (GDM) IN SECOND TRIMESTER: ICD-10-CM

## 2023-01-31 DIAGNOSIS — O99.210 OBESITY IN PREGNANCY, ANTEPARTUM: ICD-10-CM

## 2023-01-31 DIAGNOSIS — O09.521 MULTIGRAVIDA OF ADVANCED MATERNAL AGE IN FIRST TRIMESTER: ICD-10-CM

## 2023-01-31 LAB
GLUCOSE UR STRIP-MCNC: NEGATIVE MG/DL
PROT UR STRIP-MCNC: NEGATIVE MG/DL

## 2023-01-31 PROCEDURE — 0502F SUBSEQUENT PRENATAL CARE: CPT | Performed by: PHYSICIAN ASSISTANT

## 2023-01-31 NOTE — PROGRESS NOTES
Chief Complaint   Patient presents with   • Routine Prenatal Visit     36 week ob with bpp       HPI: 37 y.o.  at 36w6d gestation  She is here for BPP today d/t AMA, GDM and obesity in pregnancy  Has seen Dr Magana since last visit and will see her again on Friday  She is doing well  Has good FM  No contractions  GBS was done at last visit and was negative  BS log reviewed, a couple of elevated [id number] and 124 pp  She is working on diet and taking NPH 57 am and 44 in pm  Also on metformin  BPP today is reviewed and is reassuring, , ROSALEE 14 cm, good FHT, VTX  Cervix with no change  Vitals:    23 1442   BP: 118/74   Weight: 96.2 kg (212 lb)       ROS:  GI:  Negative  : na  Pulmonary: Negative     A/P  1. Intrauterine pregnancy at 36w6d   2. Pregnancy Risk:  HIGH RISK    Diagnoses and all orders for this visit:    1. Screening for blood or protein in urine (Primary)  -     POC Urinalysis Dipstick, Multipro    2. 36 weeks gestation of pregnancy  -     POC Urinalysis Dipstick, Multipro    3. Insulin controlled gestational diabetes mellitus (GDM) in second trimester    4. Increased BMI    5. History of prior pregnancy with IUGR     6. Multigravida of advanced maternal age in first trimester    7. Obesity in pregnancy, antepartum        -----------------------  PLAN:   Return for as scheduled.      LINNEA Haywood  2023 15:25 EST

## 2023-02-02 NOTE — PROGRESS NOTES
MATERNAL FETAL MEDICINE Follow up Note    Dear Dr Fide Shelton MD:    Thank you for your kind referral of Ynes Flores.  As you know, she is a 37 y.o.   at 37+ 2 /7 weeks gestation (Estimated Date of Delivery: 23). This is a Follow up.    Her antepartum course is complicated by:  AMA  GDMA2  Obese BMI 36    Aneuploidy Screening: declined    HPI: Today, she denies headache, blurry vision, RUQ pain. No vaginal bleeding, no contractions and endorses positive fetal movement.      Review of History:  Past Medical History:   Diagnosis Date   • Diabetes mellitus (HCC)    • Gestational diabetes    • Placenta previa 2020    Resolved this pregnancy   • Vaginal delivery      Past Surgical History:   Procedure Laterality Date   • WISDOM TOOTH EXTRACTION         Social History     Socioeconomic History   • Marital status:    Tobacco Use   • Smoking status: Never   • Smokeless tobacco: Never   Vaping Use   • Vaping Use: Never used   Substance and Sexual Activity   • Alcohol use: No   • Drug use: No   • Sexual activity: Yes     Partners: Male     Birth control/protection: None     Family History   Problem Relation Age of Onset   • Thyroid cancer Mother    • Diabetes Mother    • Colon cancer Maternal Uncle    • Skin cancer Maternal Grandmother    • Ovarian cancer Grandchild    • Ovarian cancer Grandchild    • Birth defects Neg Hx       Allergies   Allergen Reactions   • Amoxicillin Hives   • Cefdinir Hives   • Penicillins Hives      Current Outpatient Medications on File Prior to Visit   Medication Sig Dispense Refill   • Alcohol Swabs pads Please substitute as insurance requires.  Use with lancets for checking blood sugars 250 each 4   • cetirizine (zyrTEC) 10 MG tablet Take 10 mg by mouth Daily.     • cetirizine (zyrTEC) 10 MG tablet Take 1 tablet by mouth Daily.     • glucose 4-6 GM-MG per chewable tablet      • glucose blood test strip Use as instructed 250 each 12   • glucose blood test strip  "Use as instructed to test 4 times daily 250 each 12   • insulin NPH (HumuLIN N) 100 UNIT/ML injection Inject 50 Units under the skin into the appropriate area as directed 2 (Two) Times a Day Before Meals. Take 15 u in the AM and 10 in PM before bed, variable dose up to 50u--to be adjusted by physician on an ongoing basis. 20 mL 12   • Insulin Syringe-Needle U-100 31G X 5/16\" 1 ML misc Use as directed with NPH insulin 300 each 5   • Iron, Ferrous Sulfate, 325 (65 Fe) MG tablet      • Lancets 30G misc 1 Device 4 (Four) Times a Day. 120 each 11   • metFORMIN ER (GLUCOPHAGE-XR) 500 MG 24 hr tablet Take 2 tablets by mouth 2 (Two) Times a Day. 60 tablet 5   • Nutritional Supplements (Glucose Management) tablet Take 4 g by mouth As Needed (hypoglycemia sugar <60). 50 tablet 2   • Prenatal Vit-Fe Sulfate-FA-DHA (Prenatal Vitamin/Min +DHA) 27-0.8-200 MG capsule Take 1 tablet by mouth Daily. And 1 capsule daily 60 capsule 11     No current facility-administered medications on file prior to visit.        Past obstetric, gynecological, medical, surgical, family and social history reviewed.  Relevant lab work and imaging reviewed.    Review of systems  Constitutional:  denies fever, chills, malaise.   ENT/Mouth:  denies sore throat, tinnitis  Eyes: denies vision changes/pain  CV:  denies chest pain  Respiratory:  denies cough/SOB  GI:  denies N/V, diarrhea, abdominal pain.    :   denies dysuria  Skin:  denies lesions or pruritis   Neuro:  denies weakness, focal neurologic symptoms    Vitals:    02/03/23 1423   BP: 129/80   BP Location: Right arm   Patient Position: Sitting   Pulse: 84   Temp: 98.2 °F (36.8 °C)   TempSrc: Temporal   Weight: 97.2 kg (214 lb 3.2 oz)       PHYSICAL EXAM   GENERAL: Not in acute distress, AAOx3, pleasant  CARDIO: regular rate and rhythm  PULM: symmetric chest rise, speaking in complete sentences without difficulty  NEURO: awake, alert and oriented to person, place, and time  ABDOMINAL: No fundal " tenderness, no rebound or guarding, gravid  EXTREMITIES: no bilateral lower extremity edema/tenderness  SKIN: Warm, well-perfused      ULTRASOUND   Please view full ultrasound note on Imaging tab in View[employer].  Breech presentation.  Posterior placenta.  ROSALEE 17 cm, which is normal.  BPP /.    ASSESSMENT/COUNSELIN y.o.   at 37+ 2 /7 weeks gestation (Estimated Date of Delivery: 23).      Diagnoses and all orders for this visit:    1. Insulin controlled gestational diabetes mellitus (GDM) during pregnancy, antepartum (Primary)    2. AMA (advanced maternal age) primigravida 35+, third trimester        Gestational Diabetes   [  ]  well-controlled  [  ] good-control  [  ] fair-control [  ] poorly-controlled  [  ] non-compliant  [ X ] stable  [   ] improving [  ] worsening    -Doing very well.  Changed to  Metformin 1000 BID; NPH 65/44    Depending on delivery timing, if CS (baby Breech), would do half dose of insulin night before an AM CS and have a big snack before NPO.  If in afternoon, would take evening and eat  Until NPO time, then not take AM insulin.      Advanced Maternal Age  [ X ] stable  [   ] improving [  ] worsening    Patient declined genetic screening previously.  She has a normal anatomy.      Serial growth exams.    Summary of Plan  - testing weekly with primary OB and weekly diabetes management with MFM  -Weekly fetal  surveillance until delivery at OB office/MFM office split.  DM management at MFM office.  Okay to do most US at primary OB office as she has been well controlled with normal fluid.    -Continue Metformin 1000 BID; NPH 65/44  -Recommend delivery at 39 weeks unless indicated sooner as pt is well controlled.  Certainly low threshold after 37 for any issues.      Follow-up: Sending sugars until delivery.      Thank you for the consult and opportunity to care for this patient.  Please feel free to reach out with any questions or concerns.      I spent  15minutes caring for this patient on this date of service. This time includes time spent by me in the following activities: preparing for the visit, reviewing tests, obtaining and/or reviewing a separately obtained history, performing a medically appropriate examination and/or evaluation, counseling and educating the patient/family/caregiver and independently interpreting results and communicating that information with the patient/family/caregiver with greater than 50% spent in counseling and coordination of care.     5 minutes reading US    Nohelia Magana MD FACOG  Maternal Fetal Medicine-[facility]  Office: [phone]  [email or web address]

## 2023-02-03 ENCOUNTER — OFFICE VISIT (OUTPATIENT)
Dept: OBSTETRICS AND GYNECOLOGY | Facility: CLINIC | Age: 38
End: 2023-02-03
Payer: COMMERCIAL

## 2023-02-03 ENCOUNTER — HOSPITAL ENCOUNTER (OUTPATIENT)
Dept: ULTRASOUND IMAGING | Facility: HOSPITAL | Age: 38
Discharge: HOME | End: 2023-02-03
Admitting: NURSE PRACTITIONER
Payer: COMMERCIAL

## 2023-02-03 VITALS
WEIGHT: 214.2 LBS | BODY MASS INDEX: 36.77 KG/M2 | SYSTOLIC BLOOD PRESSURE: 129 MMHG | TEMPERATURE: 98.2 F | HEART RATE: 84 BPM | DIASTOLIC BLOOD PRESSURE: 80 MMHG

## 2023-02-03 DIAGNOSIS — O09.513 AMA (ADVANCED MATERNAL AGE) PRIMIGRAVIDA 35+, THIRD TRIMESTER: ICD-10-CM

## 2023-02-03 DIAGNOSIS — O24.414 INSULIN CONTROLLED GESTATIONAL DIABETES MELLITUS (GDM) DURING PREGNANCY, ANTEPARTUM: Primary | ICD-10-CM

## 2023-02-03 DIAGNOSIS — O24.414 INSULIN CONTROLLED GESTATIONAL DIABETES MELLITUS (GDM) IN SECOND TRIMESTER: ICD-10-CM

## 2023-02-03 PROCEDURE — 99213 OFFICE O/P EST LOW 20 MIN: CPT | Performed by: OBSTETRICS & GYNECOLOGY

## 2023-02-03 PROCEDURE — 76819 FETAL BIOPHYS PROFIL W/O NST: CPT

## 2023-02-03 PROCEDURE — 76819 FETAL BIOPHYS PROFIL W/O NST: CPT | Performed by: OBSTETRICS & GYNECOLOGY

## 2023-02-03 NOTE — PROGRESS NOTES
Pt reports that she is doing well and denies vaginal bleeding, cramping, contractions or LOF at this time. Reports active fetal movement. Denies HA, visual changes or epigastric pain. Denies any additional complaints at time of appointment. Next OB appointment scheduled for 2/7.  Blood sugar logs on chart for Dr. Magana to review.     Vitals:    02/03/23 1423   BP: 129/80   Pulse: 84   Temp: 98.2 °F (36.8 °C)

## 2023-02-07 ENCOUNTER — ROUTINE PRENATAL (OUTPATIENT)
Dept: OBSTETRICS AND GYNECOLOGY | Age: 38
End: 2023-02-07
Payer: COMMERCIAL

## 2023-02-07 VITALS — WEIGHT: 212.2 LBS | SYSTOLIC BLOOD PRESSURE: 124 MMHG | DIASTOLIC BLOOD PRESSURE: 86 MMHG | BODY MASS INDEX: 36.42 KG/M2

## 2023-02-07 DIAGNOSIS — O24.414 INSULIN CONTROLLED GESTATIONAL DIABETES MELLITUS (GDM) IN SECOND TRIMESTER: ICD-10-CM

## 2023-02-07 DIAGNOSIS — Z3A.37 37 WEEKS GESTATION OF PREGNANCY: Primary | ICD-10-CM

## 2023-02-07 DIAGNOSIS — O99.210 OBESITY IN PREGNANCY, ANTEPARTUM: ICD-10-CM

## 2023-02-07 DIAGNOSIS — O09.521 MULTIGRAVIDA OF ADVANCED MATERNAL AGE IN FIRST TRIMESTER: ICD-10-CM

## 2023-02-07 PROBLEM — O36.5930 POOR FETAL GROWTH AFFECTING MANAGEMENT OF MOTHER IN THIRD TRIMESTER: Status: RESOLVED | Noted: 2023-01-24 | Resolved: 2023-02-07

## 2023-02-07 PROBLEM — O32.2XX0 TRANSVERSE PRESENTATION, ANTEPARTUM: Status: RESOLVED | Noted: 2023-01-24 | Resolved: 2023-02-07

## 2023-02-07 LAB
GLUCOSE UR STRIP-MCNC: NEGATIVE MG/DL
PROT UR STRIP-MCNC: NEGATIVE MG/DL

## 2023-02-07 PROCEDURE — 0502F SUBSEQUENT PRENATAL CARE: CPT | Performed by: OBSTETRICS & GYNECOLOGY

## 2023-02-07 NOTE — PROGRESS NOTES
Pt presents for routine visit. She notes active fetal movement and denies ctx, vag bleeding/leaking fluid.    U/s: BPP 8/8, breech, larisa 14.8  Glucose log reviewed: fasting 80 to 84, pp values 98 to 140  Current insulin NPH 65 Am, 44 pm with metformin XR 1000 mg Q 12 hr    A/p: GDM: glucose values overall good with current insulin and metformin regimen. MFM has been managing. Recommended pt consider option of delivery at 38 weeks due to insulin requiring GDM and lower quartile fetal growth noted previously. Pt considered and declines preferring to observe through 39 weeks as MFM discussed. She will continue daily fmc's and agrees to return on Friday and Monday for fetal testing. She agrees to delivery at 39 weeks.     AMA: reassuring BPP today. Last growth per MFM adequate at 18 %, AC 17 %. Advised pt to report any severe headaches, vision changes or abd pain.     Breech presentation: reviewed options of primary  vs ECV. Risks of ECV reviewed with pt to include fetal distress and need for emergency . Also discussed risk of cord prolapse in labor after successful version as fetal lie has been unstable. Risks of  reviewed to include bleeding, transfusion, infection, damage to internal organs, DVT/PE and death. Patient and her spouse desire to consider. Advised she proceed quickly to L&D for any labor symptoms as currently breech.

## 2023-02-10 ENCOUNTER — ROUTINE PRENATAL (OUTPATIENT)
Dept: OBSTETRICS AND GYNECOLOGY | Age: 38
End: 2023-02-10
Payer: COMMERCIAL

## 2023-02-10 VITALS — WEIGHT: 211 LBS | BODY MASS INDEX: 36.22 KG/M2 | SYSTOLIC BLOOD PRESSURE: 120 MMHG | DIASTOLIC BLOOD PRESSURE: 74 MMHG

## 2023-02-10 DIAGNOSIS — Z13.89 SCREENING FOR BLOOD OR PROTEIN IN URINE: Primary | ICD-10-CM

## 2023-02-10 DIAGNOSIS — O09.513 AMA (ADVANCED MATERNAL AGE) PRIMIGRAVIDA 35+, THIRD TRIMESTER: ICD-10-CM

## 2023-02-10 DIAGNOSIS — O24.414 INSULIN CONTROLLED GESTATIONAL DIABETES MELLITUS (GDM) IN SECOND TRIMESTER: ICD-10-CM

## 2023-02-10 LAB
GLUCOSE UR STRIP-MCNC: NEGATIVE MG/DL
PROT UR STRIP-MCNC: NEGATIVE MG/DL

## 2023-02-10 PROCEDURE — 76819 FETAL BIOPHYS PROFIL W/O NST: CPT | Performed by: OBSTETRICS & GYNECOLOGY

## 2023-02-10 PROCEDURE — 0502F SUBSEQUENT PRENATAL CARE: CPT | Performed by: OBSTETRICS & GYNECOLOGY

## 2023-02-10 NOTE — PROGRESS NOTES
Pt presents for f/u BPP. She notes very active fetal movement and denies ctx, vag bleeding/leaking fluid. She notes her glucose values are good.    U/s: Breech, BPP= 8/8, larisa 16    A/p: Breech pres: reviewed options again. Pt and spouse are still considering. Will decide by Monday. Plan is ECV vs primary c/s if remains breech with delivery at 39 weeks. Pt advised to proceed quickly to L&D if labor signs noted.     GDM: pt notes good control, managed by MFM, continue current insulin and metformin regimen. Continue daily fmc's and repeat BPP monday

## 2023-02-13 ENCOUNTER — ROUTINE PRENATAL (OUTPATIENT)
Dept: OBSTETRICS AND GYNECOLOGY | Age: 38
End: 2023-02-13
Payer: COMMERCIAL

## 2023-02-13 ENCOUNTER — TELEPHONE (OUTPATIENT)
Dept: OBSTETRICS AND GYNECOLOGY | Age: 38
End: 2023-02-13

## 2023-02-13 ENCOUNTER — PREP FOR SURGERY (OUTPATIENT)
Dept: OTHER | Facility: HOSPITAL | Age: 38
End: 2023-02-13
Payer: COMMERCIAL

## 2023-02-13 VITALS — BODY MASS INDEX: 36.29 KG/M2 | DIASTOLIC BLOOD PRESSURE: 76 MMHG | WEIGHT: 211.4 LBS | SYSTOLIC BLOOD PRESSURE: 122 MMHG

## 2023-02-13 DIAGNOSIS — O09.523 AMA (ADVANCED MATERNAL AGE) MULTIGRAVIDA 35+, THIRD TRIMESTER: ICD-10-CM

## 2023-02-13 DIAGNOSIS — Z3A.38 38 WEEKS GESTATION OF PREGNANCY: Primary | ICD-10-CM

## 2023-02-13 LAB
GLUCOSE UR STRIP-MCNC: NEGATIVE MG/DL
PROT UR STRIP-MCNC: NEGATIVE MG/DL

## 2023-02-13 PROCEDURE — 76819 FETAL BIOPHYS PROFIL W/O NST: CPT | Performed by: OBSTETRICS & GYNECOLOGY

## 2023-02-13 PROCEDURE — 0502F SUBSEQUENT PRENATAL CARE: CPT | Performed by: OBSTETRICS & GYNECOLOGY

## 2023-02-13 NOTE — PROGRESS NOTES
Pt presents for routine visit. She notes active fetal movement and denies ctx, vag bleeding/leaking fluid.    Glucose log: fasting 73 to 85. PP values 89 to 132  BPP 8/8, larisa 18, breech    A/p: GDM: glucose values remain stable on current insulin and metformin regimen, continue through delivery. Pt aware to hold am insulin and metformin the day of delivery. She will take half pm dose of insulin as advised by MFM night prior to delivery. Pt advised to continue daily fmc's prior to scheduled delivery 2 days.    Breech pres: persistent breech presentation. Options of primary  vs external cephalic version discussed. Pt unsure of desired option. She is aware risks of  include bleeding, hemorrhage, infection, damage to organs, anesthetic complications. DVT/PE and death. She also understands risks of version include emergency , placental abruption and fetal distress. She and her  considered carefully and desire to proceed with scheduled . Preop instructions reviewed.

## 2023-02-13 NOTE — TELEPHONE ENCOUNTER
C/S scheduled Tues 2/14 at 5:00pm.  Can you please call patient to discuss her medications, what to take and when, and when to stop eating?    Tried again to schedule on Wed 2/15 since she has a medical reason and we were told no again by manager due to staffing reasons.

## 2023-02-13 NOTE — TELEPHONE ENCOUNTER
Called Ynes and reviewed instructions for  tomorrow. Reviewed recommendations from [facility] for insulin/metformin. Advised her not to take insulin or metformin tomorrow AM.

## 2023-02-14 ENCOUNTER — ANESTHESIA (OUTPATIENT)
Dept: LABOR AND DELIVERY | Facility: HOSPITAL | Age: 38
End: 2023-02-14
Payer: COMMERCIAL

## 2023-02-14 ENCOUNTER — PREP FOR SURGERY (OUTPATIENT)
Dept: OTHER | Facility: HOSPITAL | Age: 38
End: 2023-02-14
Payer: COMMERCIAL

## 2023-02-14 ENCOUNTER — ANESTHESIA EVENT (OUTPATIENT)
Dept: LABOR AND DELIVERY | Facility: HOSPITAL | Age: 38
End: 2023-02-14
Payer: COMMERCIAL

## 2023-02-14 ENCOUNTER — HOSPITAL ENCOUNTER (INPATIENT)
Facility: HOSPITAL | Age: 38
LOS: 3 days | Discharge: HOME | End: 2023-02-17
Attending: OBSTETRICS & GYNECOLOGY | Admitting: OBSTETRICS & GYNECOLOGY
Payer: COMMERCIAL

## 2023-02-14 LAB
ABO GROUP BLD: NORMAL
ALBUMIN SERPL-MCNC: 3.8 G/DL (ref 3.5–5.2)
ALBUMIN/GLOB SERPL: 1.5 G/DL
ALP SERPL-CCNC: 114 U/L (ref 39–117)
ALT SERPL W P-5'-P-CCNC: 11 U/L (ref 1–33)
ANION GAP SERPL CALCULATED.3IONS-SCNC: 14 MMOL/L (ref 5–15)
AST SERPL-CCNC: 15 U/L (ref 1–32)
BILIRUB SERPL-MCNC: 0.3 MG/DL (ref 0–1.2)
BLD GP AB SCN SERPL QL: NEGATIVE
BUN SERPL-MCNC: 9 MG/DL (ref 6–20)
BUN/CREAT SERPL: 21.4 (ref 7–25)
CALCIUM SPEC-SCNC: 8.8 MG/DL (ref 8.6–10.5)
CHLORIDE SERPL-SCNC: 103 MMOL/L (ref 98–107)
CO2 SERPL-SCNC: 20 MMOL/L (ref 22–29)
CREAT SERPL-MCNC: 0.42 MG/DL (ref 0.57–1)
DEPRECATED RDW RBC AUTO: 40.3 FL (ref 37–54)
EGFRCR SERPLBLD CKD-EPI 2021: 129.4 ML/MIN/1.73
ERYTHROCYTE [DISTWIDTH] IN BLOOD BY AUTOMATED COUNT: 13.2 % (ref 12.3–15.4)
GLOBULIN UR ELPH-MCNC: 2.6 GM/DL
GLUCOSE BLDC GLUCOMTR-MCNC: 69 MG/DL (ref 70–130)
GLUCOSE SERPL-MCNC: 62 MG/DL (ref 65–99)
HCT VFR BLD AUTO: 36.4 % (ref 34–46.6)
HGB BLD-MCNC: 11.9 G/DL (ref 12–15.9)
MCH RBC QN AUTO: 28.1 PG (ref 26.6–33)
MCHC RBC AUTO-ENTMCNC: 32.7 G/DL (ref 31.5–35.7)
MCV RBC AUTO: 86.1 FL (ref 79–97)
PLATELET # BLD AUTO: 249 10*3/MM3 (ref 140–450)
PMV BLD AUTO: 10.3 FL (ref 6–12)
POTASSIUM SERPL-SCNC: 3.7 MMOL/L (ref 3.5–5.2)
PROT SERPL-MCNC: 6.4 G/DL (ref 6–8.5)
RBC # BLD AUTO: 4.23 10*6/MM3 (ref 3.77–5.28)
RH BLD: POSITIVE
SODIUM SERPL-SCNC: 137 MMOL/L (ref 136–145)
T&S EXPIRATION DATE: NORMAL
WBC NRBC COR # BLD: 10.6 10*3/MM3 (ref 3.4–10.8)

## 2023-02-14 PROCEDURE — 25010000002 FENTANYL CITRATE (PF) 50 MCG/ML SOLUTION: Performed by: ANESTHESIOLOGY

## 2023-02-14 PROCEDURE — 0UB90ZZ EXCISION OF UTERUS, OPEN APPROACH: ICD-10-PCS | Performed by: OBSTETRICS & GYNECOLOGY

## 2023-02-14 PROCEDURE — 88305 TISSUE EXAM BY PATHOLOGIST: CPT | Performed by: OBSTETRICS & GYNECOLOGY

## 2023-02-14 PROCEDURE — 25010000002 MORPHINE PER 10 MG: Performed by: ANESTHESIOLOGY

## 2023-02-14 PROCEDURE — 86901 BLOOD TYPING SEROLOGIC RH(D): CPT | Performed by: OBSTETRICS & GYNECOLOGY

## 2023-02-14 PROCEDURE — 85027 COMPLETE CBC AUTOMATED: CPT | Performed by: OBSTETRICS & GYNECOLOGY

## 2023-02-14 PROCEDURE — 59514 CESAREAN DELIVERY ONLY: CPT | Performed by: OBSTETRICS & GYNECOLOGY

## 2023-02-14 PROCEDURE — 25010000002 ONDANSETRON PER 1 MG: Performed by: ANESTHESIOLOGY

## 2023-02-14 PROCEDURE — 59510 CESAREAN DELIVERY: CPT | Performed by: OBSTETRICS & GYNECOLOGY

## 2023-02-14 PROCEDURE — 80053 COMPREHEN METABOLIC PANEL: CPT | Performed by: OBSTETRICS & GYNECOLOGY

## 2023-02-14 PROCEDURE — 86900 BLOOD TYPING SEROLOGIC ABO: CPT | Performed by: OBSTETRICS & GYNECOLOGY

## 2023-02-14 PROCEDURE — 86850 RBC ANTIBODY SCREEN: CPT | Performed by: OBSTETRICS & GYNECOLOGY

## 2023-02-14 PROCEDURE — 25010000002 GENTAMICIN PER 80 MG: Performed by: OBSTETRICS & GYNECOLOGY

## 2023-02-14 PROCEDURE — 82962 GLUCOSE BLOOD TEST: CPT

## 2023-02-14 PROCEDURE — 25010000002 KETOROLAC TROMETHAMINE PER 15 MG: Performed by: ANESTHESIOLOGY

## 2023-02-14 RX ORDER — MORPHINE SULFATE 2 MG/ML
2 INJECTION, SOLUTION INTRAMUSCULAR; INTRAVENOUS
Status: ACTIVE | OUTPATIENT
Start: 2023-02-14 | End: 2023-02-15

## 2023-02-14 RX ORDER — SODIUM CHLORIDE 0.9 % (FLUSH) 0.9 %
10 SYRINGE (ML) INJECTION AS NEEDED
Status: CANCELLED | OUTPATIENT
Start: 2023-02-14

## 2023-02-14 RX ORDER — NALOXONE HCL 0.4 MG/ML
0.2 VIAL (ML) INJECTION
Status: CANCELLED | OUTPATIENT
Start: 2023-02-14

## 2023-02-14 RX ORDER — METHYLERGONOVINE MALEATE 0.2 MG/ML
200 INJECTION INTRAVENOUS ONCE AS NEEDED
Status: DISCONTINUED | OUTPATIENT
Start: 2023-02-14 | End: 2023-02-17 | Stop reason: HOSPADM

## 2023-02-14 RX ORDER — KETOROLAC TROMETHAMINE 30 MG/ML
INJECTION, SOLUTION INTRAMUSCULAR; INTRAVENOUS AS NEEDED
Status: DISCONTINUED | OUTPATIENT
Start: 2023-02-14 | End: 2023-02-14 | Stop reason: SURG

## 2023-02-14 RX ORDER — FENTANYL CITRATE 50 UG/ML
INJECTION, SOLUTION INTRAMUSCULAR; INTRAVENOUS
Status: COMPLETED | OUTPATIENT
Start: 2023-02-14 | End: 2023-02-14

## 2023-02-14 RX ORDER — CLINDAMYCIN PHOSPHATE 900 MG/50ML
900 INJECTION INTRAVENOUS ONCE
Status: COMPLETED | OUTPATIENT
Start: 2023-02-14 | End: 2023-02-14

## 2023-02-14 RX ORDER — PHENYLEPHRINE HCL IN 0.9% NACL 1 MG/10 ML
SYRINGE (ML) INTRAVENOUS AS NEEDED
Status: DISCONTINUED | OUTPATIENT
Start: 2023-02-14 | End: 2023-02-14 | Stop reason: SURG

## 2023-02-14 RX ORDER — ACETAMINOPHEN 500 MG
1000 TABLET ORAL ONCE
Status: DISCONTINUED | OUTPATIENT
Start: 2023-02-14 | End: 2023-02-14 | Stop reason: HOSPADM

## 2023-02-14 RX ORDER — KETOROLAC TROMETHAMINE 30 MG/ML
30 INJECTION, SOLUTION INTRAMUSCULAR; INTRAVENOUS ONCE
Status: CANCELLED | OUTPATIENT
Start: 2023-02-14 | End: 2023-02-14

## 2023-02-14 RX ORDER — HYDROMORPHONE HYDROCHLORIDE 1 MG/ML
0.5 INJECTION, SOLUTION INTRAMUSCULAR; INTRAVENOUS; SUBCUTANEOUS
Status: ACTIVE | OUTPATIENT
Start: 2023-02-14 | End: 2023-02-15

## 2023-02-14 RX ORDER — MISOPROSTOL 200 UG/1
800 TABLET ORAL ONCE AS NEEDED
Status: CANCELLED | OUTPATIENT
Start: 2023-02-14

## 2023-02-14 RX ORDER — SODIUM CHLORIDE 0.9 % (FLUSH) 0.9 %
10 SYRINGE (ML) INJECTION AS NEEDED
Status: DISCONTINUED | OUTPATIENT
Start: 2023-02-14 | End: 2023-02-17 | Stop reason: HOSPADM

## 2023-02-14 RX ORDER — OXYTOCIN/0.9 % SODIUM CHLORIDE 30/500 ML
999 PLASTIC BAG, INJECTION (ML) INTRAVENOUS ONCE
Status: COMPLETED | OUTPATIENT
Start: 2023-02-14 | End: 2023-02-14

## 2023-02-14 RX ORDER — FAMOTIDINE 10 MG/ML
20 INJECTION, SOLUTION INTRAVENOUS ONCE AS NEEDED
Status: COMPLETED | OUTPATIENT
Start: 2023-02-14 | End: 2023-02-14

## 2023-02-14 RX ORDER — ACETAMINOPHEN 325 MG/1
650 TABLET ORAL EVERY 6 HOURS
Status: DISCONTINUED | OUTPATIENT
Start: 2023-02-16 | End: 2023-02-17 | Stop reason: HOSPADM

## 2023-02-14 RX ORDER — CLINDAMYCIN PHOSPHATE 900 MG/50ML
900 INJECTION INTRAVENOUS ONCE
Status: CANCELLED | OUTPATIENT
Start: 2023-02-14 | End: 2023-02-14

## 2023-02-14 RX ORDER — IBUPROFEN 600 MG/1
600 TABLET ORAL EVERY 6 HOURS
Status: DISCONTINUED | OUTPATIENT
Start: 2023-02-16 | End: 2023-02-17 | Stop reason: HOSPADM

## 2023-02-14 RX ORDER — CARBOPROST TROMETHAMINE 250 UG/ML
250 INJECTION, SOLUTION INTRAMUSCULAR
Status: DISCONTINUED | OUTPATIENT
Start: 2023-02-14 | End: 2023-02-14 | Stop reason: HOSPADM

## 2023-02-14 RX ORDER — OXYCODONE HYDROCHLORIDE 10 MG/1
10 TABLET ORAL EVERY 4 HOURS PRN
Status: DISCONTINUED | OUTPATIENT
Start: 2023-02-14 | End: 2023-02-17 | Stop reason: HOSPADM

## 2023-02-14 RX ORDER — DIPHENHYDRAMINE HYDROCHLORIDE 50 MG/ML
25 INJECTION INTRAMUSCULAR; INTRAVENOUS EVERY 4 HOURS PRN
Status: DISCONTINUED | OUTPATIENT
Start: 2023-02-14 | End: 2023-02-17 | Stop reason: HOSPADM

## 2023-02-14 RX ORDER — SODIUM CHLORIDE, SODIUM LACTATE, POTASSIUM CHLORIDE, CALCIUM CHLORIDE 600; 310; 30; 20 MG/100ML; MG/100ML; MG/100ML; MG/100ML
125 INJECTION, SOLUTION INTRAVENOUS CONTINUOUS
Status: CANCELLED | OUTPATIENT
Start: 2023-02-14

## 2023-02-14 RX ORDER — ERYTHROMYCIN 5 MG/G
OINTMENT OPHTHALMIC
Status: ACTIVE
Start: 2023-02-14 | End: 2023-02-15

## 2023-02-14 RX ORDER — BUPIVACAINE HYDROCHLORIDE 7.5 MG/ML
INJECTION, SOLUTION EPIDURAL; RETROBULBAR
Status: COMPLETED | OUTPATIENT
Start: 2023-02-14 | End: 2023-02-14

## 2023-02-14 RX ORDER — KETOROLAC TROMETHAMINE 15 MG/ML
15 INJECTION, SOLUTION INTRAMUSCULAR; INTRAVENOUS EVERY 6 HOURS
Status: COMPLETED | OUTPATIENT
Start: 2023-02-15 | End: 2023-02-15

## 2023-02-14 RX ORDER — ONDANSETRON 2 MG/ML
4 INJECTION INTRAMUSCULAR; INTRAVENOUS ONCE AS NEEDED
Status: COMPLETED | OUTPATIENT
Start: 2023-02-14 | End: 2023-02-14

## 2023-02-14 RX ORDER — DIPHENHYDRAMINE HCL 25 MG
25 CAPSULE ORAL EVERY 4 HOURS PRN
Status: DISCONTINUED | OUTPATIENT
Start: 2023-02-14 | End: 2023-02-17 | Stop reason: HOSPADM

## 2023-02-14 RX ORDER — SODIUM CHLORIDE, SODIUM LACTATE, POTASSIUM CHLORIDE, CALCIUM CHLORIDE 600; 310; 30; 20 MG/100ML; MG/100ML; MG/100ML; MG/100ML
125 INJECTION, SOLUTION INTRAVENOUS CONTINUOUS
Status: DISCONTINUED | OUTPATIENT
Start: 2023-02-14 | End: 2023-02-17 | Stop reason: HOSPADM

## 2023-02-14 RX ORDER — SODIUM CHLORIDE 0.9 % (FLUSH) 0.9 %
10 SYRINGE (ML) INJECTION EVERY 12 HOURS SCHEDULED
Status: DISCONTINUED | OUTPATIENT
Start: 2023-02-14 | End: 2023-02-14 | Stop reason: HOSPADM

## 2023-02-14 RX ORDER — KETOROLAC TROMETHAMINE 30 MG/ML
30 INJECTION, SOLUTION INTRAMUSCULAR; INTRAVENOUS ONCE
Status: DISCONTINUED | OUTPATIENT
Start: 2023-02-14 | End: 2023-02-14 | Stop reason: HOSPADM

## 2023-02-14 RX ORDER — METHYLERGONOVINE MALEATE 0.2 MG/ML
200 INJECTION INTRAVENOUS ONCE AS NEEDED
Status: DISCONTINUED | OUTPATIENT
Start: 2023-02-14 | End: 2023-02-14 | Stop reason: HOSPADM

## 2023-02-14 RX ORDER — PHYTONADIONE 1 MG/.5ML
INJECTION, EMULSION INTRAMUSCULAR; INTRAVENOUS; SUBCUTANEOUS
Status: ACTIVE
Start: 2023-02-14 | End: 2023-02-15

## 2023-02-14 RX ORDER — CARBOPROST TROMETHAMINE 250 UG/ML
250 INJECTION, SOLUTION INTRAMUSCULAR
Status: CANCELLED | OUTPATIENT
Start: 2023-02-14

## 2023-02-14 RX ORDER — MORPHINE SULFATE 1 MG/ML
INJECTION, SOLUTION EPIDURAL; INTRATHECAL; INTRAVENOUS
Status: COMPLETED | OUTPATIENT
Start: 2023-02-14 | End: 2023-02-14

## 2023-02-14 RX ORDER — OXYTOCIN/0.9 % SODIUM CHLORIDE 30/500 ML
250 PLASTIC BAG, INJECTION (ML) INTRAVENOUS CONTINUOUS
Status: ACTIVE | OUTPATIENT
Start: 2023-02-14 | End: 2023-02-14

## 2023-02-14 RX ORDER — SIMETHICONE 80 MG
80 TABLET,CHEWABLE ORAL 4 TIMES DAILY PRN
Status: DISCONTINUED | OUTPATIENT
Start: 2023-02-14 | End: 2023-02-17 | Stop reason: HOSPADM

## 2023-02-14 RX ORDER — OXYTOCIN/0.9 % SODIUM CHLORIDE 30/500 ML
999 PLASTIC BAG, INJECTION (ML) INTRAVENOUS ONCE
Status: CANCELLED | OUTPATIENT
Start: 2023-02-14 | End: 2023-02-14

## 2023-02-14 RX ORDER — ACETAMINOPHEN 500 MG
1000 TABLET ORAL EVERY 6 HOURS
Status: COMPLETED | OUTPATIENT
Start: 2023-02-15 | End: 2023-02-15

## 2023-02-14 RX ORDER — ACETAMINOPHEN 500 MG
1000 TABLET ORAL ONCE
Status: COMPLETED | OUTPATIENT
Start: 2023-02-14 | End: 2023-02-14

## 2023-02-14 RX ORDER — SODIUM CHLORIDE 0.9 % (FLUSH) 0.9 %
10 SYRINGE (ML) INJECTION EVERY 12 HOURS SCHEDULED
Status: CANCELLED | OUTPATIENT
Start: 2023-02-14

## 2023-02-14 RX ORDER — METHYLERGONOVINE MALEATE 0.2 MG/ML
200 INJECTION INTRAVENOUS ONCE AS NEEDED
Status: CANCELLED | OUTPATIENT
Start: 2023-02-14

## 2023-02-14 RX ORDER — ACETAMINOPHEN 500 MG
1000 TABLET ORAL ONCE
Status: CANCELLED | OUTPATIENT
Start: 2023-02-14 | End: 2023-02-14

## 2023-02-14 RX ORDER — OXYCODONE HYDROCHLORIDE 5 MG/1
5 TABLET ORAL EVERY 4 HOURS PRN
Status: DISCONTINUED | OUTPATIENT
Start: 2023-02-14 | End: 2023-02-17 | Stop reason: HOSPADM

## 2023-02-14 RX ORDER — OXYTOCIN/0.9 % SODIUM CHLORIDE 30/500 ML
250 PLASTIC BAG, INJECTION (ML) INTRAVENOUS CONTINUOUS
Status: CANCELLED | OUTPATIENT
Start: 2023-02-14 | End: 2023-02-14

## 2023-02-14 RX ORDER — MISOPROSTOL 200 UG/1
600 TABLET ORAL ONCE AS NEEDED
Status: DISCONTINUED | OUTPATIENT
Start: 2023-02-14 | End: 2023-02-17 | Stop reason: HOSPADM

## 2023-02-14 RX ORDER — OXYTOCIN/0.9 % SODIUM CHLORIDE 30/500 ML
125 PLASTIC BAG, INJECTION (ML) INTRAVENOUS CONTINUOUS PRN
Status: COMPLETED | OUTPATIENT
Start: 2023-02-14 | End: 2023-02-14

## 2023-02-14 RX ORDER — DOCUSATE SODIUM 100 MG/1
100 CAPSULE, LIQUID FILLED ORAL 2 TIMES DAILY
Status: DISCONTINUED | OUTPATIENT
Start: 2023-02-14 | End: 2023-02-17 | Stop reason: HOSPADM

## 2023-02-14 RX ORDER — MISOPROSTOL 200 UG/1
800 TABLET ORAL ONCE AS NEEDED
Status: DISCONTINUED | OUTPATIENT
Start: 2023-02-14 | End: 2023-02-14 | Stop reason: HOSPADM

## 2023-02-14 RX ADMIN — MORPHINE SULFATE 200 MCG: 1 INJECTION, SOLUTION EPIDURAL; INTRATHECAL; INTRAVENOUS at 17:43

## 2023-02-14 RX ADMIN — GENTAMICIN SULFATE 360 MG: 40 INJECTION, SOLUTION INTRAMUSCULAR; INTRAVENOUS at 17:50

## 2023-02-14 RX ADMIN — Medication 100 MCG: at 17:59

## 2023-02-14 RX ADMIN — Medication 125 ML/HR: at 20:11

## 2023-02-14 RX ADMIN — ONDANSETRON 4 MG: 2 INJECTION INTRAMUSCULAR; INTRAVENOUS at 23:30

## 2023-02-14 RX ADMIN — Medication 100 MCG: at 18:07

## 2023-02-14 RX ADMIN — ACETAMINOPHEN 1000 MG: 500 TABLET, FILM COATED ORAL at 16:27

## 2023-02-14 RX ADMIN — Medication 100 MCG: at 17:52

## 2023-02-14 RX ADMIN — KETOROLAC TROMETHAMINE 30 MG: 30 INJECTION, SOLUTION INTRAMUSCULAR at 18:59

## 2023-02-14 RX ADMIN — SODIUM CHLORIDE, POTASSIUM CHLORIDE, SODIUM LACTATE AND CALCIUM CHLORIDE 125 ML/HR: 600; 310; 30; 20 INJECTION, SOLUTION INTRAVENOUS at 15:10

## 2023-02-14 RX ADMIN — FAMOTIDINE 20 MG: 10 INJECTION INTRAVENOUS at 16:37

## 2023-02-14 RX ADMIN — FENTANYL CITRATE 10 MCG: 0.05 INJECTION, SOLUTION INTRAMUSCULAR; INTRAVENOUS at 17:43

## 2023-02-14 RX ADMIN — ONDANSETRON 4 MG: 2 INJECTION INTRAMUSCULAR; INTRAVENOUS at 16:35

## 2023-02-14 RX ADMIN — Medication 999 ML/HR: at 18:14

## 2023-02-14 RX ADMIN — BUPIVACAINE HYDROCHLORIDE 1.6 ML: 7.5 INJECTION, SOLUTION EPIDURAL; RETROBULBAR at 17:43

## 2023-02-14 RX ADMIN — CLINDAMYCIN PHOSPHATE 900 MG: 900 INJECTION, SOLUTION INTRAVENOUS at 17:18

## 2023-02-14 NOTE — H&P
[facility]  Obstetric History and Physical    Chief Complaint   Patient presents with   • Scheduled      Breech presentation. Positive FM<. Denies ctx, ROM, or vaginal bleeding       Subjective     Patient is a 37 y.o. female  currently at 38w6d, who presents for scheduled  for gestational diabetes with breech presentation. Patient has considered option of external cephalic version and desires to proceed with scheduled  delivery. Risks of  have been reviewed to include bleeding, transfusion, infection, damage to internal organs, anesthetic complications, DVT/PE and death. Patient declines tubal sterilization. She notes active fetal movement and denies bleeding or leaking fluid. She denies regular ctx. She denies fever/chills or n/v.    Her prenatal care is complicated by .  Patient Active Problem List   Diagnosis   • Increased BMI   • History of prior pregnancy with IUGR    • Multigravida of advanced maternal age in first trimester   • Obesity in pregnancy, antepartum   • Insulin controlled gestational diabetes mellitus (GDM) in second trimester   • Breech presentation   .  Her previous obstetric/gynecological history is noted for prior vaginal delivery X 3.     The following portions of the patients history were reviewed and updated as appropriate: current medications, allergies, past medical history, past surgical history, past family history, past social history and problem list .       Prenatal Information:  Prenatal Results     POC Urine Glucose/Protein     Test Value Reference Range Date Time    Urine Glucose  Negative mg/dL Negative 23 1213    Urine Protein  Negative mg/dL Negative 23 1213          Initial Prenatal Labs     Test Value Reference Range Date Time    Hemoglobin  13.3 g/dL 11.1 - 15.9 22 0935    Hematocrit  39.1 % 34.0 - 46.6 22 0935    Platelets  348 x10E3/uL 150 - 450 22 0935    Rubella IgG  1.99 index Immune >0.99  07/25/22 0935    Hepatitis B SAg  Negative  Negative 07/25/22 0935    Hepatitis C Ab  0.1 s/co ratio 0.0 - 0.9 07/25/22 0935    RPR  Non Reactive  Non Reactive 07/25/22 0935    ABO  B   02/14/23 1520    Rh  Positive   02/14/23 1520    Antibody Screen  Negative  Negative 07/25/22 0935    HIV  Non Reactive  Non Reactive 07/25/22 0935    Urine Culture  Final report   07/25/22 0923    Gonorrhea  Negative  Negative 07/25/22 0922    Chlamydia  Negative  Negative 07/25/22 0922    TSH  0.540 uIU/mL 0.450 - 4.500 12/27/22 1511       0.411 uIU/mL 0.270 - 4.200 08/31/22 1518       0.317 uIU/mL 0.450 - 4.500 07/25/22 0935    HgB A1c   6.0 % 4.8 - 5.6 07/25/22 0935          2nd and 3rd Trimester     Test Value Reference Range Date Time    Hemoglobin (repeated)  11.9 g/dL 12.0 - 15.9 02/14/23 1520       11.9 g/dL 11.1 - 15.9 12/27/22 1511       12.1 g/dL 12.0 - 15.9 11/29/22 1040    Hematocrit (repeated)  36.4 % 34.0 - 46.6 02/14/23 1520       35.5 % 34.0 - 46.6 12/27/22 1511       36.2 % 34.0 - 46.6 11/29/22 1040    Platelets   249 10*3/mm3 140 - 450 02/14/23 1520       261 x10E3/uL 150 - 450 12/27/22 1511       254 10*3/mm3 140 - 450 11/29/22 1040       348 x10E3/uL 150 - 450 07/25/22 0935    GCT        Antibody Screen (repeated)  Negative   02/14/23 1520    GTT Fasting  110 mg/dL 65 - 94 08/14/20 0838    GTT 1 Hr  255 mg/dL 65 - 139 08/14/20 0838    GTT 2 Hr  215 mg/dL 65 - 154 08/14/20 0838    GTT 3 Hr  92 mg/dL 65 - 139 08/14/20 0838    Group B Strep  Negative  Negative 01/24/23 1055          Drug Screening     Test Value Reference Range Date Time    Amphetamine Screen        Barbiturate Screen        Benzodiazepine Screen        Methadone Screen        Phencyclidine Screen        Opiates Screen        THC Screen        Cocaine Screen        Propoxyphene Screen        Buprenorphine Screen        Methamphetamine Screen        Oxycodone Screen        Tricyclic Antidepressants Screen              Other (Risk screening)     Test  Value Reference Range Date Time    Varicella IgG ^ pos history vaccine   08/16/22     Parvovirus IgG        CMV IgG        Cystic Fibrosis ^ declines   08/16/22     Hemoglobin electrophoresis        NIPT ^ declines   08/16/22     MSAFP-4        AFP (for NTD only)              Legend    ^: Historical                      External Prenatal Results     Pregnancy Outside Results - Transcribed From Office Records - See Scanned Records For Details     Test Value Date Time    ABO  B  02/14/23 1520    Rh  Positive  02/14/23 1520    Antibody Screen  Negative  02/14/23 1520       Negative  07/25/22 0935    Varicella IgG ^ pos history vaccine  08/16/22     Rubella  1.99 index 07/25/22 0935    Hgb  11.9 g/dL 02/14/23 1520       11.9 g/dL 12/27/22 1511       12.1 g/dL 11/29/22 1040       13.3 g/dL 07/25/22 0935    Hct  36.4 % 02/14/23 1520       35.5 % 12/27/22 1511       36.2 % 11/29/22 1040       39.1 % 07/25/22 0935    Glucose Fasting GTT  110 mg/dL 08/14/20 0838    Glucose Tolerance Test 1 hour  255 mg/dL 08/14/20 0838    Glucose Tolerance Test 3 hour  92 mg/dL 08/14/20 0838    Gonorrhea (discrete)  Negative  07/25/22 0922    Chlamydia (discrete)  Negative  07/25/22 0922    RPR  Non Reactive  07/25/22 0935    VDRL       Syphilis Antibody       HBsAg  Negative  07/25/22 0935    Herpes Simplex Virus PCR       Herpes Simplex VIrus Culture       HIV  Non Reactive  07/25/22 0935    Hep C RNA Quant PCR       Hep C Antibody  0.1 s/co ratio 07/25/22 0935    AFP       Group B Strep  Negative  01/24/23 1055    GBS Susceptibility to Clindamycin       GBS Susceptibility to Erythromycin       Fetal Fibronectin       Genetic Testing, Maternal Blood             Drug Screening     Test Value Date Time    Urine Drug Screen       Amphetamine Screen       Barbiturate Screen       Benzodiazepine Screen       Methadone Screen       Phencyclidine Screen       Opiates Screen       THC Screen       Cocaine Screen       Propoxyphene Screen        Buprenorphine Screen       Methamphetamine Screen       Oxycodone Screen       Tricyclic Antidepressants Screen             Legend    ^: Historical                         Past OB History:     OB History    Para Term  AB Living   4 3 3 0 0 3   SAB IAB Ectopic Molar Multiple Live Births   0 0 0 0 0 3      # Outcome Date GA Lbr Regulo/2nd Weight Sex Delivery Anes PTL Lv   4 Current            3 Term 20 37w4d 08:08 / 00:12 2638 g (5 lb 13.1 oz) F Vag-Spont EPI N JONA      Birth Comments: scale 1      Name: LORE NUNEZIRBRIAN      Apgar1: 8  Apgar5: 9   2 Term 18 39w2d 00:58 / 00:46 2702 g (5 lb 15.3 oz) M Vag-Spont EPI N JONA      Birth Comments: scale 4      Name: BETINA NUNEZ      Apgar1: 8  Apgar5: 9   1 Term 10/26/15 39w0d  3827 g (8 lb 7 oz) M Vag-Spont EPI N JONA       Past Medical History: Past Medical History:   Diagnosis Date   • Diabetes mellitus (HCC)    • Gestational diabetes    • Placenta previa 2020    Resolved this pregnancy   • Vaginal delivery       Past Surgical History Past Surgical History:   Procedure Laterality Date   • WISDOM TOOTH EXTRACTION        Family History: Family History   Problem Relation Age of Onset   • Thyroid cancer Mother    • Diabetes Mother    • Colon cancer Maternal Uncle    • Skin cancer Maternal Grandmother    • Ovarian cancer Grandchild    • Ovarian cancer Grandchild    • Birth defects Neg Hx       Social History:  reports that she has never smoked. She has never used smokeless tobacco.   reports no history of alcohol use.   reports no history of drug use.        General ROS: Pertinent items are noted in HPI    Objective       Vital Signs Range for the last 24 hours  Temperature: Temp:  [97.9 °F (36.6 °C)] 97.9 °F (36.6 °C)   Temp Source: Temp src: Oral   BP: BP: (107)/(56) 107/56   Pulse: Heart Rate:  [82] 82   Respirations: Resp:  [18] 18   SPO2:     O2 Amount (l/min):     O2 Devices     Weight: Weight:  [96.6 kg (213 lb)-99.3 kg (219 lb)] 96.6 kg  (213 lb)     Physical Examination: General appearance - alert, well appearing, and in no distress  Mental status - alert, oriented to person, place, and time  Chest - normal respiratory effort,   Heart - normal rate and regular rhythm  Abdomen - soft, gravid, nontender  Neurological - alert, oriented, normal speech, no focal findings or movement disorder noted  Extremities - bilateral lower ext without cords or tenderness; trace edema noted  Skin - normal coloration and turgor lesions noted    Presentation: Transverse to breech with bedside u/s and fetal movement                          Fetal Heart Rate Assessment   Method: Fetal HR Assessment Method: external   Beats/min: Fetal HR (beats/min): 120   Baseline: Fetal HR Baseline: normal range   Variability: Fetal HR Variability: moderate (amplitude range 6 to 25 bpm)   Accels: Fetal HR Accelerations: greater than/equal to 15 bpm, lasting at least 15 seconds   Decels: Fetal HR Decelerations: absent   Tracing Category:       Uterine Assessment   Method: Method: external tocotransducer   Frequency (min): Contraction Frequency (Minutes): occasional   Ctx Count in 10 min:     Duration:     Intensity:     Intensity by IUPC:     Resting Tone: Uterine Resting Tone: soft by palpation   Resting Tone by IUPC:     [place] Units:         Assessment & Plan       Breech presentation    Multigravida of advanced maternal age in first trimester    Insulin controlled gestational diabetes mellitus (GDM) in second trimester        Assessment:  1.  Intrauterine pregnancy at 38w6d gestation with reactive fetal status.    2.  Breech presentation: transverse then breech with bedside u/s today. Patient desires to proceed with  as planned. She understands risks of procedure  3.  Obstetrical history significant for vaginal delivery X 3  4.  GBS status:   Strep Gp B RATNA   Date Value Ref Range Status   2023 Negative Negative Final     Comment:     Centers for Disease Control and  Prevention (CDC) and American Congress  of Obstetricians and Gynecologists (ACOG) guidelines for prevention of   group B streptococcal (GBS) disease specify co-collection of  a vaginal and rectal swab specimen to maximize sensitivity of GBS  detection. Per the CDC and ACOG, swabbing both the lower vagina and  rectum substantially increases the yield of detection compared with  sampling the vagina alone.  Penicillin G, ampicillin, or cefazolin are indicated for intrapartum  prophylaxis of  GBS colonization. Reflex susceptibility  testing should be performed prior to use of clindamycin only on GBS  isolates from penicillin-allergic women who are considered a high risk  for anaphylaxis. Treatment with vancomycin without additional testing  is warranted if resistance to clindamycin is noted.         Plan:  1. Proceed with  delivery   2. Plan of care has been reviewed with patient and spouse  3.  Risks, benefits of treatment plan have been discussed.  4.  All questions have been answered.        Fide Shetlon MD  2023  17:38 EST

## 2023-02-14 NOTE — ANESTHESIA PROCEDURE NOTES
Spinal Block      Patient reassessed immediately prior to procedure    Patient location during procedure: OB  Start Time: 2/14/2023 5:43 PM  Performed By  Anesthesiologist: Marques Davis MD  Spinal Block Prep:  Patient Position:sitting  Sterile Tech:cap, gloves, mask and sterile barriers  Prep:Chloraprep  Patient Monitoring:blood pressure monitoring    Spinal Block Procedure  Approach:midline  Location:L4-L5  Needle Type:Sprotte  Needle Gauge:24 G  Placement of Spinal needle event:cerebrospinal fluid aspirated  Paresthesia: noMedications: bupivacaine PF (MARCAINE) 0.75 % injection - Spinal   1.6 mL - 2/14/2023 5:43:00 PM  Morphine PF injection - Intrathecal   200 mcg - 2/14/2023 5:43:00 PM  fentaNYL citrate (PF) (SUBLIMAZE) injection - Intrathecal   10 mcg - 2/14/2023 5:43:00 PM   Post Assessment  Patient Tolerance:patient tolerated the procedure well with no apparent complications  Complications no

## 2023-02-14 NOTE — ANESTHESIA PREPROCEDURE EVALUATION
Anesthesia Evaluation     Patient summary reviewed and Nursing notes reviewed   no history of anesthetic complications:  NPO Solid Status: > 8 hours  NPO Liquid Status: > 2 hours           Airway   Mallampati: II  TM distance: >3 FB  Neck ROM: full  no difficulty expected  Dental - normal exam     Pulmonary - negative pulmonary ROS and normal exam   (-) COPD, asthma, not a smoker, lung cancer  Cardiovascular - negative cardio ROS and normal exam  Exercise tolerance: good (4-7 METS)    ECG reviewed  Rhythm: regular  Rate: normal    (-) hypertension, valvular problems/murmurs, past MI, CAD, dysrhythmias, angina, CHF, cardiac stents, CABG      Neuro/Psych- negative ROS  (-) seizures, TIA, CVA  GI/Hepatic/Renal/Endo    (+) obesity,   diabetes mellitus gestational well controlled using insulin,   (-) hiatal hernia, GERD, PUD, hepatitis, liver disease, no renal disease, GI bleed    Musculoskeletal (-) negative ROS    Abdominal   (+) obese,    Substance History - negative use     OB/GYN    (+) Pregnant,     Comment: 38wk IUP      Other - negative ROS                     Anesthesia Plan    ASA 3     spinal       Anesthetic plan, risks, benefits, and alternatives have been provided, discussed and informed consent has been obtained with: patient and spouse/significant other.    Plan discussed with attending.        CODE STATUS:

## 2023-02-15 LAB
ALBUMIN SERPL-MCNC: 3 G/DL (ref 3.5–5.2)
ALBUMIN/GLOB SERPL: 1.5 G/DL
ALP SERPL-CCNC: 84 U/L (ref 39–117)
ALT SERPL W P-5'-P-CCNC: 11 U/L (ref 1–33)
ANION GAP SERPL CALCULATED.3IONS-SCNC: 8.7 MMOL/L (ref 5–15)
AST SERPL-CCNC: 15 U/L (ref 1–32)
BASOPHILS # BLD AUTO: 0.03 10*3/MM3 (ref 0–0.2)
BASOPHILS NFR BLD AUTO: 0.3 % (ref 0–1.5)
BILIRUB SERPL-MCNC: 0.2 MG/DL (ref 0–1.2)
BUN SERPL-MCNC: 6 MG/DL (ref 6–20)
BUN/CREAT SERPL: 16.2 (ref 7–25)
CALCIUM SPEC-SCNC: 8 MG/DL (ref 8.6–10.5)
CHLORIDE SERPL-SCNC: 104 MMOL/L (ref 98–107)
CO2 SERPL-SCNC: 21.3 MMOL/L (ref 22–29)
CREAT SERPL-MCNC: 0.37 MG/DL (ref 0.57–1)
DEPRECATED RDW RBC AUTO: 41.3 FL (ref 37–54)
EGFRCR SERPLBLD CKD-EPI 2021: 133.4 ML/MIN/1.73
EOSINOPHIL # BLD AUTO: 0.02 10*3/MM3 (ref 0–0.4)
EOSINOPHIL NFR BLD AUTO: 0.2 % (ref 0.3–6.2)
ERYTHROCYTE [DISTWIDTH] IN BLOOD BY AUTOMATED COUNT: 13.1 % (ref 12.3–15.4)
GLOBULIN UR ELPH-MCNC: 2 GM/DL
GLUCOSE SERPL-MCNC: 70 MG/DL (ref 65–99)
HCT VFR BLD AUTO: 31.3 % (ref 34–46.6)
HGB BLD-MCNC: 10.3 G/DL (ref 12–15.9)
IMM GRANULOCYTES # BLD AUTO: 0.04 10*3/MM3 (ref 0–0.05)
IMM GRANULOCYTES NFR BLD AUTO: 0.4 % (ref 0–0.5)
LYMPHOCYTES # BLD AUTO: 2.33 10*3/MM3 (ref 0.7–3.1)
LYMPHOCYTES NFR BLD AUTO: 20.6 % (ref 19.6–45.3)
MCH RBC QN AUTO: 28.7 PG (ref 26.6–33)
MCHC RBC AUTO-ENTMCNC: 32.9 G/DL (ref 31.5–35.7)
MCV RBC AUTO: 87.2 FL (ref 79–97)
MONOCYTES # BLD AUTO: 0.74 10*3/MM3 (ref 0.1–0.9)
MONOCYTES NFR BLD AUTO: 6.5 % (ref 5–12)
NEUTROPHILS NFR BLD AUTO: 72 % (ref 42.7–76)
NEUTROPHILS NFR BLD AUTO: 8.16 10*3/MM3 (ref 1.7–7)
NRBC BLD AUTO-RTO: 0 /100 WBC (ref 0–0.2)
PLATELET # BLD AUTO: 219 10*3/MM3 (ref 140–450)
PMV BLD AUTO: 10.1 FL (ref 6–12)
POTASSIUM SERPL-SCNC: 3.7 MMOL/L (ref 3.5–5.2)
PROT SERPL-MCNC: 5 G/DL (ref 6–8.5)
RBC # BLD AUTO: 3.59 10*6/MM3 (ref 3.77–5.28)
SODIUM SERPL-SCNC: 134 MMOL/L (ref 136–145)
WBC NRBC COR # BLD: 11.32 10*3/MM3 (ref 3.4–10.8)

## 2023-02-15 PROCEDURE — 85025 COMPLETE CBC W/AUTO DIFF WBC: CPT | Performed by: OBSTETRICS & GYNECOLOGY

## 2023-02-15 PROCEDURE — 25010000002 ENOXAPARIN PER 10 MG: Performed by: OBSTETRICS & GYNECOLOGY

## 2023-02-15 PROCEDURE — 80053 COMPREHEN METABOLIC PANEL: CPT | Performed by: OBSTETRICS & GYNECOLOGY

## 2023-02-15 PROCEDURE — 25010000002 KETOROLAC TROMETHAMINE PER 15 MG: Performed by: OBSTETRICS & GYNECOLOGY

## 2023-02-15 PROCEDURE — 63710000001 DIPHENHYDRAMINE PER 50 MG: Performed by: ANESTHESIOLOGY

## 2023-02-15 RX ORDER — ENOXAPARIN SODIUM 100 MG/ML
40 INJECTION SUBCUTANEOUS EVERY 24 HOURS
Status: DISCONTINUED | OUTPATIENT
Start: 2023-02-15 | End: 2023-02-17 | Stop reason: HOSPADM

## 2023-02-15 RX ADMIN — ACETAMINOPHEN 1000 MG: 500 TABLET, FILM COATED ORAL at 01:33

## 2023-02-15 RX ADMIN — DOCUSATE SODIUM 100 MG: 100 CAPSULE, LIQUID FILLED ORAL at 22:25

## 2023-02-15 RX ADMIN — DOCUSATE SODIUM 100 MG: 100 CAPSULE, LIQUID FILLED ORAL at 08:51

## 2023-02-15 RX ADMIN — ACETAMINOPHEN 1000 MG: 500 TABLET, FILM COATED ORAL at 06:49

## 2023-02-15 RX ADMIN — ACETAMINOPHEN 1000 MG: 500 TABLET, FILM COATED ORAL at 13:17

## 2023-02-15 RX ADMIN — KETOROLAC TROMETHAMINE 15 MG: 15 INJECTION, SOLUTION INTRAMUSCULAR; INTRAVENOUS at 04:15

## 2023-02-15 RX ADMIN — ENOXAPARIN SODIUM 40 MG: 100 INJECTION SUBCUTANEOUS at 19:51

## 2023-02-15 RX ADMIN — DIPHENHYDRAMINE HYDROCHLORIDE 25 MG: 25 CAPSULE ORAL at 06:52

## 2023-02-15 RX ADMIN — KETOROLAC TROMETHAMINE 15 MG: 15 INJECTION, SOLUTION INTRAMUSCULAR; INTRAVENOUS at 22:25

## 2023-02-15 RX ADMIN — ACETAMINOPHEN 1000 MG: 500 TABLET, FILM COATED ORAL at 19:50

## 2023-02-15 RX ADMIN — KETOROLAC TROMETHAMINE 15 MG: 15 INJECTION, SOLUTION INTRAMUSCULAR; INTRAVENOUS at 17:31

## 2023-02-15 RX ADMIN — KETOROLAC TROMETHAMINE 15 MG: 15 INJECTION, SOLUTION INTRAMUSCULAR; INTRAVENOUS at 11:44

## 2023-02-15 NOTE — PLAN OF CARE
Goal Outcome Evaluation:  Plan of Care Reviewed With: patient           Outcome Evaluation: PP day one, zhou in place and up x1 one this shift. VSS, pain controlled. Fundus and lochia WDL. Dressing CDI. Breastfeeding and bonding with infant.

## 2023-02-15 NOTE — LACTATION NOTE
This note was copied from a baby's chart.  P4 term baby, 14hrs old, nursing well and Mom is using NS as she has done with her other children. She reports this baby is nursing the best out of the four children. Encouraged to call for any assistance.

## 2023-02-15 NOTE — LACTATION NOTE
This note was copied from a baby's chart.  P4, T, SGA baby. Mom BF all her children for 14-15 months with NS due to inverted nipples. She already brought one to the hospital. Reported baby latched really well with the NS. Breasts assessed and her nipples are inverted. Mom has PBP. She denies any questions at this time.

## 2023-02-15 NOTE — PLAN OF CARE
Problem: Adult Inpatient Plan of Care  Goal: Plan of Care Review  2023 by Jen Valadez RN  Outcome: Ongoing, Progressing  Flowsheets (Taken 2023)  Outcome Evaluation: Pt had , light vaginal bleeding present, abd dressing clean/dry/intact, pain 0/10.  Breastfeeding  2023 by Jen Valadez RN  Outcome: Ongoing, Progressing  Goal: Patient-Specific Goal (Individualized)  2023 by Jen Valadez RN  Outcome: Ongoing, Progressing  2023 by Jen Valadez RN  Outcome: Ongoing, Progressing  Goal: Absence of Hospital-Acquired Illness or Injury  2023 by Jen Valadez RN  Outcome: Ongoing, Progressing  2023 by Jen Valadez RN  Outcome: Ongoing, Progressing  Intervention: Prevent and Manage VTE (Venous Thromboembolism) Risk  Recent Flowsheet Documentation  Taken 2023 by Jen Valadez RN  VTE Prevention/Management:   bilateral   sequential compression devices on  Taken 2023 by Jen Valadez RN  VTE Prevention/Management:   bilateral   sequential compression devices on  Taken 2023 by Jen Valadez RN  VTE Prevention/Management:   bilateral   sequential compression devices on  Taken 2023 by Jen Valadez RN  VTE Prevention/Management:   bilateral   sequential compression devices on  Taken 2023 by Jen Valadez RN  VTE Prevention/Management:   bilateral   sequential compression devices on  Taken 2023 by Jen Valadez RN  VTE Prevention/Management:   bilateral   sequential compression devices on  Taken 2023 by Jen Valadez RN  VTE Prevention/Management:   bilateral   sequential compression devices on  Taken 2023 by Jen Valadez RN  VTE Prevention/Management:   bilateral   sequential compression devices on  Taken 2023 by Jen Valadez RN  VTE Prevention/Management:   bilateral   sequential compression devices on  Goal:  Optimal Comfort and Wellbeing  2023 by Jen Valadez RN  Outcome: Ongoing, Progressing  2023 by Jen Valadez RN  Outcome: Ongoing, Progressing  Goal: Readiness for Transition of Care  2023 by Jen Valadez RN  Outcome: Ongoing, Progressing  2023 by Jen Valadez RN  Outcome: Ongoing, Progressing     Problem: Bleeding ( Delivery)  Goal: Bleeding is Controlled  2023 by Jen Valadez RN  Outcome: Ongoing, Progressing  2023 by Jen Valadez RN  Outcome: Ongoing, Progressing     Problem: Change in Fetal Wellbeing ( Delivery)  Goal: Stable Fetal Wellbeing  2023 by Jen Valadez RN  Outcome: Ongoing, Progressing  2023 by Jen Valadez RN  Outcome: Ongoing, Progressing     Problem: Infection ( Delivery)  Goal: Absence of Infection Signs and Symptoms  2023 by Jen Valadez RN  Outcome: Ongoing, Progressing  2023 by Jen Valadez RN  Outcome: Ongoing, Progressing     Problem: Respiratory Compromise ( Delivery)  Goal: Effective Oxygenation and Ventilation  2023 by Jen Valadez RN  Outcome: Ongoing, Progressing  2023 by Jen Valadez RN  Outcome: Ongoing, Progressing     Problem: Skin Injury Risk Increased  Goal: Skin Health and Integrity  2023 by Jen Valadez RN  Outcome: Ongoing, Progressing  2023 by Jen Valadez RN  Outcome: Ongoing, Progressing     Problem: Device-Related Complication Risk (Anesthesia/Analgesia, Neuraxial)  Goal: Safe Infusion Delivery Completion  2023 by Jen Valadez RN  Outcome: Ongoing, Progressing  2023 by Jen Valadez RN  Outcome: Ongoing, Progressing     Problem: Infection (Anesthesia/Analgesia, Neuraxial)  Goal: Absence of Infection Signs and Symptoms  2023 by Jen Valadez RN  Outcome: Ongoing, Progressing  2023 by Jen Valadez  RN  Outcome: Ongoing, Progressing     Problem: Nausea and Vomiting (Anesthesia/Analgesia, Neuraxial)  Goal: Nausea and Vomiting Relief  2/14/2023 2204 by Jen Valadez RN  Outcome: Ongoing, Progressing  2/14/2023 2202 by Jen Valadez RN  Outcome: Ongoing, Progressing     Problem: Pain (Anesthesia/Analgesia, Neuraxial)  Goal: Effective Pain Control  2/14/2023 2204 by Jen Valadez RN  Outcome: Ongoing, Progressing  2/14/2023 2202 by Jen Valadez RN  Outcome: Ongoing, Progressing     Problem: Respiratory Compromise (Anesthesia/Analgesia, Neuraxial)  Goal: Effective Oxygenation and Ventilation  2/14/2023 2204 by Jen Valadez RN  Outcome: Ongoing, Progressing  2/14/2023 2202 by Jen Valadez RN  Outcome: Ongoing, Progressing     Problem: Sensorimotor Impairment (Anesthesia/Analgesia, Neuraxial)  Goal: Baseline Motor Function  2/14/2023 2204 by Jen Valadez RN  Outcome: Ongoing, Progressing  2/14/2023 2202 by Jen Valadez RN  Outcome: Ongoing, Progressing     Problem: Urinary Retention (Anesthesia/Analgesia, Neuraxial)  Goal: Effective Urinary Elimination  2/14/2023 2204 by Jen Valadez RN  Outcome: Ongoing, Progressing  2/14/2023 2202 by Jen Valadez RN  Outcome: Ongoing, Progressing     Problem: Adjustment to Role Transition (Postpartum Vaginal Delivery)  Goal: Successful Maternal Role Transition  2/14/2023 2204 by Jen Valadez RN  Outcome: Ongoing, Progressing  2/14/2023 2202 by Jen Valadez RN  Outcome: Ongoing, Progressing     Problem: Bleeding (Postpartum Vaginal Delivery)  Goal: Hemostasis  2/14/2023 2204 by Jen Valadez RN  Outcome: Ongoing, Progressing  2/14/2023 2202 by Jen Valadez RN  Outcome: Ongoing, Progressing     Problem: Infection (Postpartum Vaginal Delivery)  Goal: Absence of Infection Signs/Symptoms  2/14/2023 2204 by Jen Valadez RN  Outcome: Ongoing, Progressing  2/14/2023 2202 by Jen Valadez RN  Outcome: Ongoing,  Progressing  Intervention: Prevent or Manage Infection  Recent Flowsheet Documentation  Taken 2/14/2023 2030 by Jen Valadez RN  Perineal Care: absorbent brief/pad changed  Taken 2/14/2023 1930 by Jen Valadez RN  Perineal Care: absorbent brief/pad changed     Problem: Pain (Postpartum Vaginal Delivery)  Goal: Acceptable Pain Control  2/14/2023 2204 by Jen Valadez RN  Outcome: Ongoing, Progressing  2/14/2023 2202 by Jen Valadez RN  Outcome: Ongoing, Progressing     Problem: Urinary Retention (Postpartum Vaginal Delivery)  Goal: Effective Urinary Elimination  2/14/2023 2204 by Jen Valadez RN  Outcome: Ongoing, Progressing  2/14/2023 2202 by Jen Valadez RN  Outcome: Ongoing, Progressing   Goal Outcome Evaluation:

## 2023-02-15 NOTE — OP NOTE
. SECTION FULL OPERATIVE REPORT  Pre-operative Diagnosis: 38 week Intrauterine pregnancy, Gestational diabetes, Breech presentation  Post-operative Diagnosis: same and uterine fibroids  Procedure: Primary Low Transverse  Section, Myomectomy  Anesthesia: spinal  Surgeon(s): Fide Shelton MD  Assistant(s): Geoffrey Kahn MD  Infant: LV male infant, Apgars 8/8, weight 5lbs, 13.1 oz,   Findings:  Umbilical cord around the infant's shoulder and with a true knot; uterus with several small fibroids, largest approximately 4 cm on upper anterior wall, normal fallopian tubes and ovaries  Complications: none  Specimens: fibroid  EBL: 500 mL  QBL: 142 mL      Description of Procedure:  The patient was taken to the operating room where anesthesia was found to be adequate. She was prepped and draped in the usual sterile fashion in the dorsal supine position with a leftward tilt. A surgical time-out was performed.   A Pfannenstiel skin incision was made with the scalpel and carried down to the underlying layer of fascia. The fascia was then incised in the midline and the incision was extended laterally with feldman scissors. The superior aspect of the fascia was then grasped with Kocher clamps and the underlying rectus muscles were then dissected off bluntly and with the Feldman scissors. The inferior aspect of the fascia was then grasped with Kocher clamps and dissected off similarly with Feldman scissors. The rectus muscles were  and the peritoneum entered. The peritoneal incision was then carried superiorly and inferiorly taking care to identify the bladder at the lower aspect.   The bladder blade was inserted. The vesicouterine peritoneum was then identified and entered sharply with Metzenbaum scissors and a bladder flap was created.The bladder blade was reinserted and the uterine incision was made in a low transverse fashion using the scalpel. This was extended bilaterally digitally.  The bladder blade was removed  and the breech was grasped and the infant was delivered with standard breech maneuvers without difficulty. The umbilical cord was wrapped around the shoulder and reduced following delivery.  A true knot was also noted in the umbilical cord. The nose and mouth were suctioned and the cord was clamped and cut after 30 seconds. The infant was taken to the warmer for the waiting staff. Cord blood was collected. The placenta was removed with gentle manual traction. The uterus was exteriorized and cleared of all clots and debris. There was a 2 cm fibroid extending into the uterine incision at the left aspect and obstructing uterine closure. The capsule over the fibroid was opened with cautery and the fibroid was grasped and removed. The base was cauterized to completely excise the fibroid. The base was inspected and hemostatic. The uterine incision was then repaired in two layers using 0 vicryl suture. There were small areas of bleeding at the mid incision that were made hemostatic with figure of eight stitches of 0 vicryl. The uterus was examined and noted to be hemostatic and returned to the abdomen.  The posterior cul-de-sac and gutters were cleared of all clots and debris. The uterus was then re-inspected to ensure hemostasis as were all subfascial tissues. There was slight bleeding at the left aspect of the incision and this was made hemostatic with a figure of eight stitch of 0 vicryl. The site was carefully observed and remained hemostatic. All subfascial tissues were again inspected and hemostatic. The uterine incision was re-inspected and hemostasis was confirmed. The fascia was re-approximated in a running fashion using 0-vicryl suture. The subcutaneous tissue was re-approximated in a running fashion with 3-0 vicryl. The skin was closed with 4-0 monocryl.     The patient tolerated the procedure well. All sponge, lap and needle counts were correct. She went to the recovery room in stable fashion.    The patient  received gentamicin and clindamycin for antibiotic prophylaxis.     The surgical assistant was responsible for the following tasks:   Retraction, suturing, suctioning and assistance with delivery of the infant.  Their skilled assistance was necessary for the success of this case.          Fide Shelton MD  February 14, 2023

## 2023-02-15 NOTE — PROGRESS NOTES
[facility]   PROGRESS NOTE    Post-Op Day 1 S/P   Subjective     Patient reports:  Pain is well controlled.  She is ambulating. Tolerating diet. Tolerating po -- normal.  Intake -- c/o of tolerating po solids.   flatus reported. Vaginal bleeding is as much as expected.    ROS:  Pulm: neg for soa  GI: neg for n/v  : neg for heavy bleeding  Musculoskeletal: neg for leg pain    Objective      Vitals: Vital Signs Range for the last 24 hours  Temperature: Temp:  [97 °F (36.1 °C)-97.9 °F (36.6 °C)] 97.1 °F (36.2 °C)   Temp Source: Temp src: Oral   BP: BP: ()/(51-81) 98/58   Pulse: Heart Rate:  [68-95] 68   Respirations: Resp:  [18] 18   SPO2: SpO2:  [94 %-98 %] 96 %   O2 Amount (l/min):     O2 Devices Device (Oxygen Therapy): room air   Weight: Weight:  [96.6 kg (213 lb)-99.3 kg (219 lb)] 96.6 kg (213 lb)            Physical Exam    Lungs Normal respiratory effort   Abdomen Soft, fundus firm at U-1   Incision  Bandage in place, no surrounding erythema or swelling   Extremities bilateral lower ext with trace edema. SCD's in place. No tenderness noted     labs:  Lab Results   Component Value Date    WBC 11.32 (H) 02/15/2023    HGB 10.3 (L) 02/15/2023    HCT 31.3 (L) 02/15/2023    MCV 87.2 02/15/2023     02/15/2023     Results from last 7 days   Lab Units 23  1520   ABO TYPING  B   RH TYPING  Positive     Assessment & Plan        Breech presentation    Multigravida of advanced maternal age in first trimester    Insulin controlled gestational diabetes mellitus (GDM) in second trimester     delivery delivered      Assessment:    Ynes ZHOU Sandra is Day 1  post-partum  , Low Transverse  : pt is doing well and denies any issues currently    Pt desires circumcision for son. RN advised hold today for glucose monitoring. Pt informed.      Plan:  remove dressing, continue post op care and ambulation encouraged.        Fide Shelton MD  2/15/2023  10:21 EST

## 2023-02-15 NOTE — ANESTHESIA POSTPROCEDURE EVALUATION
"Patient: Ynes ZHOU Sandra    Procedure Summary     Date: 23 Room / Location:  [facility]    Anesthesia Start:  Anesthesia Stop:     Procedure:  SECTION PRIMARY (Abdomen) Diagnosis:       Breech presentation, single or unspecified fetus      (Breech presentation, single or unspecified fetus [O32.1XX0])    Surgeons: Fide Shelton MD Provider: Armond Patricio MD    Anesthesia Type: spinal ASA Status: 3          Anesthesia Type: spinal    Vitals  Vitals Value Taken Time   /81 23   Temp 36.4 °C (97.6 °F) 23   Pulse 83 23   Resp 18 23   SpO2 97 % 23   Vitals shown include unvalidated device data.        Post Anesthesia Care and Evaluation    Patient location during evaluation: bedside  Patient participation: complete - patient participated  Level of consciousness: awake and alert  Pain score: 0  Pain management: adequate    Airway patency: patent  Anesthetic complications: No anesthetic complications    Cardiovascular status: acceptable  Respiratory status: acceptable  Hydration status: acceptable    Comments: /81 (BP Location: Left arm, Patient Position: Sitting)   Pulse 71   Temp 36.4 °C (97.6 °F) (Oral)   Resp 18   Ht 162.6 cm (64\")   Wt 96.6 kg (213 lb)   LMP 2022 (Exact Date)   SpO2 97%   Breastfeeding Unknown   BMI 36.56 kg/m²       "

## 2023-02-16 LAB
LAB AP CASE REPORT: NORMAL
PATH REPORT.FINAL DX SPEC: NORMAL
PATH REPORT.GROSS SPEC: NORMAL

## 2023-02-16 PROCEDURE — 25010000002 ENOXAPARIN PER 10 MG: Performed by: OBSTETRICS & GYNECOLOGY

## 2023-02-16 RX ADMIN — IBUPROFEN 600 MG: 600 TABLET, FILM COATED ORAL at 04:27

## 2023-02-16 RX ADMIN — ACETAMINOPHEN 650 MG: 325 TABLET, FILM COATED ORAL at 20:25

## 2023-02-16 RX ADMIN — OXYCODONE 5 MG: 5 TABLET ORAL at 10:23

## 2023-02-16 RX ADMIN — OXYCODONE 5 MG: 5 TABLET ORAL at 20:25

## 2023-02-16 RX ADMIN — IBUPROFEN 600 MG: 600 TABLET, FILM COATED ORAL at 22:40

## 2023-02-16 RX ADMIN — ACETAMINOPHEN 650 MG: 325 TABLET, FILM COATED ORAL at 08:54

## 2023-02-16 RX ADMIN — ACETAMINOPHEN 650 MG: 325 TABLET, FILM COATED ORAL at 14:17

## 2023-02-16 RX ADMIN — IBUPROFEN 600 MG: 600 TABLET, FILM COATED ORAL at 16:28

## 2023-02-16 RX ADMIN — IBUPROFEN 600 MG: 600 TABLET, FILM COATED ORAL at 10:23

## 2023-02-16 RX ADMIN — ACETAMINOPHEN 650 MG: 325 TABLET, FILM COATED ORAL at 01:01

## 2023-02-16 RX ADMIN — DOCUSATE SODIUM 100 MG: 100 CAPSULE, LIQUID FILLED ORAL at 20:25

## 2023-02-16 RX ADMIN — DOCUSATE SODIUM 100 MG: 100 CAPSULE, LIQUID FILLED ORAL at 08:54

## 2023-02-16 RX ADMIN — ENOXAPARIN SODIUM 40 MG: 100 INJECTION SUBCUTANEOUS at 20:25

## 2023-02-16 NOTE — PLAN OF CARE
Goal Outcome Evaluation:  Plan of Care Reviewed With: patient        Progress: improving  Outcome Evaluation: VS stable; Pain controlled with tylenol and Motrin. Had oxycodone X1 this am. Ambulates without difficulty.

## 2023-02-16 NOTE — LACTATION NOTE
This note was copied from a baby's chart.  Mom reports baby has been sleepy with breast feeding , she pumped and gave him 5mls of EBM. Baby was re-warmed per RN. Encouraged pumping every 2-3hrs feeding baby all EBM until he is more awake and breast feeding better.

## 2023-02-16 NOTE — PROGRESS NOTES
" POSTPARTUM ROUNDS    Chief complaint: post C/S concerns  SUBJECTIVE:  Patient appears comfortable, and pain seems to be controlled with by mouth medicines.  She is ambulating. .  Discussed advancing activity and diet.       ROS:  Pulm: neg for soa  GI: neg for heavy bleeding  Musculoskel: neg for leg pain      OBJECTIVE:  Vital Signs: /76 (BP Location: Right arm, Patient Position: Sitting)   Pulse 85   Temp 97.9 °F (36.6 °C) (Oral)   Resp 18   Ht 162.6 cm (64\")   Wt 96.6 kg (213 lb)   LMP 2022 (Exact Date)   SpO2 98%   Breastfeeding Yes   BMI 36.56 kg/m²     Intake/Output Summary (Last 24 hours) at 2023 0935  Last data filed at 2023 0428  Gross per 24 hour   Intake --   Output 2300 ml   Net -2300 ml       Constitutional:  The patient is well nourished.  Cardiovascular:  RRR  Resp:  nonlabored breathing  The incision is normal  Gastrointestinal:  fundus firm below umbilicus  Extremities:  no edema,no evidence dvt    LABS / IMAGING:  Lab Results (last 24 hours)     ** No results found for the last 24 hours. **            OB History    Para Term  AB Living   4 4 4     4   SAB IAB Ectopic Molar Multiple Live Births           0 4      # Outcome Date GA Lbr Regulo/2nd Weight Sex Delivery Anes PTL Lv   4 Term 23 38w6d  2640 g (5 lb 13.1 oz) M CS-LTranv Spinal N JONA      Birth Comments: Panda OR 2    3 Term 20 37w4d 08:08 / 00:12 2638 g (5 lb 13.1 oz) F Vag-Spont EPI N JONA      Birth Comments: scale 1   2 Term 18 39w2d 00:58 / 00:46 2702 g (5 lb 15.3 oz) M Vag-Spont EPI N JONA      Birth Comments: scale 4   1 Term 10/26/15 39w0d  3827 g (8 lb 7 oz) M Vag-Spont EPI N JONA          ASSESSMENT AND PLAN:    Principal Problem:    Breech presentation  Active Problems:    Multigravida of advanced maternal age in first trimester    Insulin controlled gestational diabetes mellitus (GDM) in second trimester     delivery delivered         Patient is postop day " 2 from LTCS  Patient is doing well  Advance diet as tolerated      Regular diet   Encourage ambulation   Infant's blood sugars and temperature has stabilized, requesting circumcision risk benefits alternatives discussed.    Shaheed Benton MD    2/16/2023  09:35 EST

## 2023-02-16 NOTE — PLAN OF CARE
Goal Outcome Evaluation:  Plan of Care Reviewed With: patient        Progress: improving  Outcome Evaluation: pain controlled. up ad [place]. breast feeding well. fundus and lochia wdl. inc CDI

## 2023-02-17 VITALS
SYSTOLIC BLOOD PRESSURE: 119 MMHG | HEIGHT: 64 IN | BODY MASS INDEX: 36.37 KG/M2 | RESPIRATION RATE: 18 BRPM | DIASTOLIC BLOOD PRESSURE: 80 MMHG | TEMPERATURE: 98.1 F | HEART RATE: 75 BPM | WEIGHT: 213 LBS | OXYGEN SATURATION: 98 %

## 2023-02-17 RX ORDER — IBUPROFEN 600 MG/1
600 TABLET ORAL EVERY 6 HOURS
Qty: 30 TABLET | Refills: 0 | Status: SHIPPED | OUTPATIENT
Start: 2023-02-17 | End: 2023-03-29

## 2023-02-17 RX ORDER — OXYCODONE HYDROCHLORIDE 5 MG/1
5 TABLET ORAL EVERY 4 HOURS PRN
Qty: 18 TABLET | Refills: 0 | Status: SHIPPED | OUTPATIENT
Start: 2023-02-17 | End: 2023-02-21

## 2023-02-17 RX ADMIN — ACETAMINOPHEN 650 MG: 325 TABLET, FILM COATED ORAL at 02:35

## 2023-02-17 RX ADMIN — IBUPROFEN 600 MG: 600 TABLET, FILM COATED ORAL at 06:26

## 2023-02-17 RX ADMIN — ACETAMINOPHEN 650 MG: 325 TABLET, FILM COATED ORAL at 08:37

## 2023-02-17 RX ADMIN — ACETAMINOPHEN 650 MG: 325 TABLET, FILM COATED ORAL at 14:20

## 2023-02-17 RX ADMIN — IBUPROFEN 600 MG: 600 TABLET, FILM COATED ORAL at 12:27

## 2023-02-17 RX ADMIN — OXYCODONE HYDROCHLORIDE 10 MG: 10 TABLET ORAL at 02:36

## 2023-02-17 RX ADMIN — DOCUSATE SODIUM 100 MG: 100 CAPSULE, LIQUID FILLED ORAL at 08:37

## 2023-02-17 NOTE — DISCHARGE INSTR - ACTIVITY
Pelvic rest x6 weeks  No driving x 2 weeks or while taking narcotics.  No lifting anything heavier than 15lbs for 6 weeks

## 2023-02-17 NOTE — PROGRESS NOTES
" POSTPARTUM ROUNDS    SUBJECTIVE:    CC:  POD 3 from CS    Subjective:  Pt is doing well, pain is well controlled, pt is ambulating and tolerating a regular diet.  Voiding well.    Review of Systems - Negative except cramping,   NO FEVER OR CHILLS , NO NAUSEA OR VOMITING, NO LEG PAIN    OBJECTIVE:  Vital Signs: /80 (BP Location: Right arm, Patient Position: Sitting)   Pulse 75   Temp 98.1 °F (36.7 °C) (Oral)   Resp 18   Ht 162.6 cm (64\")   Wt 96.6 kg (213 lb)   LMP 2022 (Exact Date)   SpO2 98%   Breastfeeding Yes   BMI 36.56 kg/m²     Intake/Output Summary (Last 24 hours) at 2023 1241  Last data filed at 2023 1300  Gross per 24 hour   Intake --   Output 900 ml   Net -900 ml       Constitutional: The patient is well nourished. Alert and oriented.  Mental status is upbeat, no signs postpartum depression.   Cardiovascular:RRR  Resp: nonlabored breathing  The incision is  clean, dry and Intact   Gastrointestinal: fundus firm below umbilicus  Extremities:no edema, non-tender bilateral    LABS / IMAGING:  Lab Results (last 24 hours)     Procedure Component Value Units Date/Time    Tissue Pathology Exam [[id number]] Collected: 23 1824    Specimen: Tissue from Uterus Updated: 23 1412     Case Report --     Surgical Pathology Report                         Case: [id number]                                  Authorizing Provider:  Fide Shelton MD    Collected:           2023 06:24 PM          Ordering Location:     [facility]  Received:            2023 09:51 PM                                 LABOR DELIVERY                                                               Pathologist:           Grisel Betancourt MD                                                          Specimen:    Uterus                                                                                      Final Diagnosis --     1. Uterus, Myomectomy (7 Grams):   A. Leiomyoma, 3.3 " "cm maximally.    MEC/clm       Gross Description --     1. Uterus.  Received in formalin labeled \"uterus\" is a 7 gram, 3.3 x 2.6 x 1.8 cm multilobulated white whorled nodule which is serially sectioned to reveal white whorled cut surfaces with no areas of hemorrhage or necrosis.  Representative sections are submitted as 1A-1B (two sections per cassette).     Jap/uso/mec/kds            ASSESSMENT AND PLAN:    POD 3 from  delivery:    Patient Active Problem List   Diagnosis   • Increased BMI   • History of prior pregnancy with IUGR    • Multigravida of advanced maternal age in first trimester   • Obesity in pregnancy, antepartum   • Insulin controlled gestational diabetes mellitus (GDM) in second trimester   • Breech presentation   •  delivery delivered       Patient is postop day 3 from LTCS  Patient is doing well   Encourage ambulation   Discharge home    Jeanette Bishop MD    2023  12:41 EST      "

## 2023-02-17 NOTE — DISCHARGE SUMMARY
[facility]  Delivery Discharge Summary    Discharge Diagnosis:     1.  delivery  2. Breech presentation      Patient Active Problem List   Diagnosis   • Increased BMI   • History of prior pregnancy with IUGR    • Multigravida of advanced maternal age in first trimester   • Obesity in pregnancy, antepartum   • Insulin controlled gestational diabetes mellitus (GDM) in second trimester   • Breech presentation   •  delivery delivered        Primary OB Clinician: Cat    EDC: Estimated Date of Delivery: 23    Gestational Age:38w6d    Antepartum complications: gestational diabetes    Date of Delivery: 2023   Time of Delivery: 6:11 PM     Delivered By:  Fide Hogue     Delivery Type: , Low Transverse      Tubal Ligation: n/a    Baby: male infant;   Apgar:  8  @ 1 minute /   Apgar:  8  @ 5 minutes        Anesthesia: Spinal      Intrapartum complications: None    Laceration: No    Episiotomy: No    Placenta: Manual removal     Feeding method: Breastfeeding Status: Yes    Rh Immune globulin given: no    Rubella vaccine given: no    Discharge Date: 2023; Discharge Time: 12:46 EST    Early Discharge:  NO  Hospital Course:  The patient was admitted following delivery by  for breech presentation and fibroids.  She did well postpartum with normal return of bowel function and remained afebrile.    Blood type: B+  Rubella immune  dTap given prenatally        Plan:    Follow-up appointment with Dr hogue in 1 week.

## 2023-02-17 NOTE — LACTATION NOTE
Breasts filling and mom able to pump 16-32 cc. Baby latches well per mom and has the strongest suckle of all her babies. [facility] next week for pre-post weight feeding. Mom has .

## 2023-02-28 PROBLEM — O09.521 MULTIGRAVIDA OF ADVANCED MATERNAL AGE IN FIRST TRIMESTER: Status: RESOLVED | Noted: 2022-07-25 | Resolved: 2023-02-28

## 2023-03-01 ENCOUNTER — POSTPARTUM VISIT (OUTPATIENT)
Dept: OBSTETRICS AND GYNECOLOGY | Age: 38
End: 2023-03-01
Payer: COMMERCIAL

## 2023-03-01 VITALS
DIASTOLIC BLOOD PRESSURE: 76 MMHG | SYSTOLIC BLOOD PRESSURE: 112 MMHG | HEIGHT: 64 IN | BODY MASS INDEX: 34.15 KG/M2 | WEIGHT: 200 LBS

## 2023-03-01 DIAGNOSIS — Z09 POSTOP CHECK: ICD-10-CM

## 2023-03-01 PROCEDURE — 0503F POSTPARTUM CARE VISIT: CPT | Performed by: OBSTETRICS & GYNECOLOGY

## 2023-03-01 NOTE — PROGRESS NOTES
"Chief Complaint   Patient presents with   • Postpartum Care     2 week Postpartum Check, Cesearean section, Baby BOY (5lbs 13.1oz), Pt is currently Breastfeeding, Pt has no complaints today         HPI  Ynes Flores is a 37 y.o. female she denies any issues. She denies heavy bleeding or issues with lactation. She notes Bryant is doing well. She denies any mood issues.         The following portions of the patient's history were reviewed and updated as appropriate: allergies, current medications, past family history, past medical history, past social history, past surgical history and problem list.    Review of Systems  Pertinent items are noted in HPI.    /76   Ht 162.6 cm (64\")   Wt 90.7 kg (200 lb)   LMP 2022 (Exact Date)   Breastfeeding Yes   BMI 34.33 kg/m²         Physical Exam  Constitutional:       Appearance: Normal appearance.   Pulmonary:      Effort: Pulmonary effort is normal.   Abdominal:      Palpations: Abdomen is soft.      Tenderness: There is no abdominal tenderness. There is no guarding.      Comments: Incision is clean/dry/intact.  No erythema or swelling.   Musculoskeletal:      Comments: Bilateral lower ext are without cords, tenderness or edema   Neurological:      General: No focal deficit present.      Mental Status: She is alert and oriented to person, place, and time.   Psychiatric:         Mood and Affect: Mood normal.         Behavior: Behavior normal.             Diagnoses and all orders for this visit:    1.  delivery delivered (Primary)    2. Postop check      Normal incision check. Ongoing activity limitations/instructions reviewed.   F/u 4 weeks with fasting/2 hour screen for diabetes  Discussed contraception. They are planning condoms then possible vas.           "

## 2023-03-29 ENCOUNTER — POSTPARTUM VISIT (OUTPATIENT)
Dept: OBSTETRICS AND GYNECOLOGY | Age: 38
End: 2023-03-29
Payer: COMMERCIAL

## 2023-03-29 VITALS
HEIGHT: 64 IN | BODY MASS INDEX: 33.12 KG/M2 | DIASTOLIC BLOOD PRESSURE: 68 MMHG | WEIGHT: 194 LBS | SYSTOLIC BLOOD PRESSURE: 124 MMHG

## 2023-03-29 PROBLEM — Z86.32 HISTORY OF GESTATIONAL DIABETES MELLITUS (GDM): Status: ACTIVE | Noted: 2022-09-16

## 2023-03-29 PROBLEM — O99.210 OBESITY IN PREGNANCY, ANTEPARTUM: Status: RESOLVED | Noted: 2022-07-25 | Resolved: 2023-03-29

## 2023-03-29 LAB
GLUCOSE 1H P 75 G GLC PO SERPL-MCNC: 138 MG/DL (ref 65–179)
GLUCOSE 2H P 75 G GLC PO SERPL-MCNC: 94 MG/DL (ref 65–154)
GLUCOSE P FAST SERPL-MCNC: 82 MG/DL (ref 65–94)

## 2023-09-14 ENCOUNTER — TELEPHONE (OUTPATIENT)
Dept: OBSTETRICS AND GYNECOLOGY | Age: 38
End: 2023-09-14
Payer: COMMERCIAL

## 2023-09-14 NOTE — TELEPHONE ENCOUNTER
Pt called stating she talked to lactation and was advised to have an order placed for all purpose nipple cream    Pharmacy verified     Please advise
